# Patient Record
Sex: FEMALE | Race: WHITE | Employment: OTHER | URBAN - NONMETROPOLITAN AREA
[De-identification: names, ages, dates, MRNs, and addresses within clinical notes are randomized per-mention and may not be internally consistent; named-entity substitution may affect disease eponyms.]

---

## 2021-04-27 ENCOUNTER — OFFICE VISIT (OUTPATIENT)
Dept: PAIN MANAGEMENT | Age: 81
End: 2021-04-27
Payer: MEDICARE

## 2021-04-27 VITALS
HEART RATE: 80 BPM | SYSTOLIC BLOOD PRESSURE: 108 MMHG | DIASTOLIC BLOOD PRESSURE: 80 MMHG | WEIGHT: 120 LBS | BODY MASS INDEX: 23.56 KG/M2 | HEIGHT: 60 IN

## 2021-04-27 DIAGNOSIS — M54.50 ACUTE MIDLINE LOW BACK PAIN WITHOUT SCIATICA: Primary | ICD-10-CM

## 2021-04-27 DIAGNOSIS — S32.050A CLOSED COMPRESSION FRACTURE OF L5 VERTEBRA, INITIAL ENCOUNTER (HCC): ICD-10-CM

## 2021-04-27 PROCEDURE — 4040F PNEUMOC VAC/ADMIN/RCVD: CPT | Performed by: NURSE PRACTITIONER

## 2021-04-27 PROCEDURE — 1036F TOBACCO NON-USER: CPT | Performed by: NURSE PRACTITIONER

## 2021-04-27 PROCEDURE — 1090F PRES/ABSN URINE INCON ASSESS: CPT | Performed by: NURSE PRACTITIONER

## 2021-04-27 PROCEDURE — 99203 OFFICE O/P NEW LOW 30 MIN: CPT | Performed by: NURSE PRACTITIONER

## 2021-04-27 PROCEDURE — G8427 DOCREV CUR MEDS BY ELIG CLIN: HCPCS | Performed by: NURSE PRACTITIONER

## 2021-04-27 PROCEDURE — G8400 PT W/DXA NO RESULTS DOC: HCPCS | Performed by: NURSE PRACTITIONER

## 2021-04-27 PROCEDURE — G8420 CALC BMI NORM PARAMETERS: HCPCS | Performed by: NURSE PRACTITIONER

## 2021-04-27 PROCEDURE — 99204 OFFICE O/P NEW MOD 45 MIN: CPT | Performed by: NURSE PRACTITIONER

## 2021-04-27 PROCEDURE — 1123F ACP DISCUSS/DSCN MKR DOCD: CPT | Performed by: NURSE PRACTITIONER

## 2021-04-27 RX ORDER — TRAMADOL HYDROCHLORIDE 50 MG/1
1 TABLET ORAL PRN
COMMUNITY
Start: 2021-04-24 | End: 2021-10-30

## 2021-04-27 RX ORDER — GABAPENTIN 100 MG/1
2 CAPSULE ORAL 3 TIMES DAILY
COMMUNITY
Start: 2021-03-05

## 2021-04-27 RX ORDER — ACETAMINOPHEN/DIPHENHYDRAMINE 500MG-25MG
1 TABLET ORAL NIGHTLY PRN
COMMUNITY

## 2021-04-27 RX ORDER — SILICONE ADHESIVE 1.5" X 3"
1 SHEET (EA) TOPICAL 3 TIMES DAILY
COMMUNITY

## 2021-04-27 RX ORDER — ALENDRONATE SODIUM 70 MG/1
70 TABLET ORAL WEEKLY
COMMUNITY
Start: 2020-08-20 | End: 2022-05-10

## 2021-04-27 RX ORDER — SENNOSIDES 8.6 MG
650 CAPSULE ORAL EVERY 8 HOURS PRN
COMMUNITY

## 2021-04-27 ASSESSMENT — ENCOUNTER SYMPTOMS
EYES NEGATIVE: 1
RESPIRATORY NEGATIVE: 1

## 2021-04-27 NOTE — PROGRESS NOTES
Subjective:      Patient ID: Danny Azul is a 78 y.o. female. Chief Complaint   Patient presents with   [facility]    Fall     4/22- Compression fx    Lower Back Pain     HPI Returns for compression fracture post fall. Tramadol prn with relief    Pain Assessment  Location of Pain: Back  Location Modifiers: Inferior, Posterior  Severity of Pain: 6  Quality of Pain: Sharp  Duration of Pain: Persistent  Frequency of Pain: Constant  Aggravating Factors: Other (Comment), Walking, Stairs(sitting)  Limiting Behavior: Yes  Relieving Factors: Heat, Other (Comment)(Tramadol)  Result of Injury: Yes  Work-Related Injury: No  Are there other pain locations you wish to document?: No    Allergies   Allergen Reactions    Asa [Aspirin]     Codeine     Pcn [Penicillins]        Outpatient Medications Marked as Taking for the 4/27/21 encounter (Office Visit) with GOYO Poe - CNP   Medication Sig Dispense Refill    alendronate (FOSAMAX) 70 MG tablet Take 70 mg by mouth once a week      diphenhydrAMINE-APAP, sleep, (TYLENOL PM EXTRA STRENGTH)  MG tablet Take 1 tablet by mouth nightly as needed for Sleep      gabapentin (NEURONTIN) 100 MG capsule Take 2 capsules by mouth 3 times daily.  sodium chloride () 5 % ophthalmic solution Apply 1 drop to eye 3 times daily      traMADol (ULTRAM) 50 MG tablet Take 1 tablet by mouth as needed.  acetaminophen (TYLENOL 8 HOUR ARTHRITIS PAIN) 650 MG extended release tablet Take 650 mg by mouth every 8 hours as needed for Pain      citalopram (CELEXA) 20 MG tablet Take 20 mg by mouth daily.  clopidogrel (PLAVIX) 75 MG tablet Take 75 mg by mouth daily.  b complex vitamins capsule Take 1 capsule by mouth daily.  Pyridoxine HCl (VITAMIN B-6) 50 MG tablet Take 50 mg by mouth daily.  vitamin D3 (CHOLECALCIFEROL) 1000 UNITS TABS tablet Take 1,000 Units by mouth 2 times daily.  Valerian Root 100 MG CAPS Take  by mouth nightly.  Alum Hydroxide-Mag Trisilicate (GAVISCON) 28-24.2 MG CHEW Take by mouth as needed          Past Medical History:   Diagnosis Date    Chronic back pain     Neuropathy     Stroke ([address]     x2 in 2009 and x1 in 2010       Past Surgical History:   Procedure Laterality Date    ABDOMEN SURGERY  1995    bowel resection for diverticulosis    APPENDECTOMY      FIXATION KYPHOPLASTY  7/3/14    T 12    HYSTERECTOMY         No family history on file. Social History     Socioeconomic History    Marital status:      Spouse name: None    Number of children: None    Years of education: None    Highest education level: None   Occupational History    None   Social Needs    Financial resource strain: None    Food insecurity     Worry: None     Inability: None    Transportation needs     Medical: None     Non-medical: None   Tobacco Use    Smoking status: Never Smoker    Smokeless tobacco: Never Used   Substance and Sexual Activity    Alcohol use: No    Drug use: No    Sexual activity: None   Lifestyle    Physical activity     Days per week: None     Minutes per session: None    Stress: None   Relationships    Social connections     Talks on phone: None     Gets together: None     Attends [employer] service: None     Active member of club or organization: None     Attends meetings of clubs or organizations: None     Relationship status: None    Intimate partner violence     Fear of current or ex partner: None     Emotionally abused: None     Physically abused: None     Forced sexual activity: None   Other Topics Concern    None   Social History Narrative    None     Review of Systems   Constitutional: Positive for activity change. HENT: Negative. Eyes: Negative. Respiratory: Negative. Cardiovascular: Negative. Musculoskeletal: Positive for arthralgias and myalgias. Skin: Negative. Neurological: Negative for weakness and numbness. Hematological: Bruises/bleeds easily. Psychiatric/Behavioral: Positive for sleep disturbance. Objective:   Physical Exam  Vitals signs reviewed. Constitutional:       General: She is awake. She is not in acute distress. Appearance: She is well-developed. She is not ill-appearing or toxic-appearing. Interventions: She is not intubated. HENT:      Head: Normocephalic and atraumatic. Right Ear: External ear normal.      Left Ear: External ear normal.      Nose: Nose normal.      Mouth/Throat:      Lips: Pink. Mouth: Mucous membranes are moist.   Eyes:      General: Lids are normal.   Pulmonary:      Effort: Pulmonary effort is normal. No tachypnea, bradypnea, accessory muscle usage, prolonged expiration, respiratory distress or retractions. She is not intubated. Abdominal:      General: Abdomen is flat. Palpations: Abdomen is soft. Musculoskeletal:      Lumbar back: She exhibits bony tenderness and pain. Skin:     General: Skin is warm and dry. Capillary Refill: Capillary refill takes less than 2 seconds. Coloration: Skin is not ashen or jaundiced. Findings: No rash. Nails: There is no clubbing. Neurological:      Mental Status: She is alert, oriented to person, place, and time and easily aroused. GCS: GCS eye subscore is 4. GCS verbal subscore is 5. GCS motor subscore is 6. Cranial Nerves: No cranial nerve deficit or facial asymmetry. Psychiatric:         Attention and Perception: Attention normal.         Mood and Affect: Mood and affect normal.         Speech: Speech normal.         Behavior: Behavior is cooperative. Judgment: Judgment normal.         Assessment / Plan:      Diagnosis Orders   1.  Acute midline low back pain without sciatica  MRI LUMBAR SPINE WO CONTRAST   2. Closed compression fracture of L5 vertebra, initial encounter ([facility] [address]  MRI LUMBAR SPINE WO CONTRAST        Lumbar MRI wo contrast. Will plan L5 kyphoplasty    The patient was counseled at length about the risks of black Covid-19 during their perioperative period and any recovery window from their procedure. The patient was made aware that black Covid-19  may worsen their prognosis for recovering from their procedure  and lend to a higher morbidity and/or mortality risk. All material risks, benefits, and reasonable alternatives including postponing the procedure were discussed. The patient does wish to proceed with the procedure at this time. Informed consent has not been obtained for procedure. Rashid Farfan  on blood thinners. Medications to hold have been reviewed with patient. Blood thinners must be held with permission from your cardiologist or primary care physician. A letter is  required to besent to PCP/Cardiologist regarding holding medications for procedure to decrease bleeding risk.

## 2021-04-28 ENCOUNTER — PRE-PROCEDURE TELEPHONE (OUTPATIENT)
Dept: PREADMISSION TESTING | Age: 81
End: 2021-04-28

## 2021-04-29 ENCOUNTER — HOSPITAL ENCOUNTER (OUTPATIENT)
Dept: MRI IMAGING | Age: 81
Discharge: HOME | End: 2021-05-01
Payer: MEDICARE

## 2021-04-29 DIAGNOSIS — S32.050A CLOSED COMPRESSION FRACTURE OF L5 VERTEBRA, INITIAL ENCOUNTER (HCC): ICD-10-CM

## 2021-04-29 DIAGNOSIS — M54.50 ACUTE MIDLINE LOW BACK PAIN WITHOUT SCIATICA: ICD-10-CM

## 2021-04-29 PROCEDURE — 72148 MRI LUMBAR SPINE W/O DYE: CPT

## 2021-05-03 ENCOUNTER — PRE-PROCEDURE TELEPHONE (OUTPATIENT)
Dept: PREADMISSION TESTING | Age: 81
End: 2021-05-03

## 2021-05-03 NOTE — TELEPHONE ENCOUNTER
Spoke with patients son and confirmed a covid swab appt for May 5th at 1015. Instructions provided, verbalizes understanding.

## 2021-05-05 ENCOUNTER — HOSPITAL ENCOUNTER (OUTPATIENT)
Dept: PREADMISSION TESTING | Age: 81
Setting detail: SPECIMEN
Discharge: HOME | End: 2021-05-09
Payer: MEDICARE

## 2021-05-05 DIAGNOSIS — Z11.59 ENCOUNTER FOR SCREENING FOR OTHER VIRAL DISEASES: Primary | ICD-10-CM

## 2021-05-05 PROCEDURE — U0005 INFEC AGEN DETEC AMPLI PROBE: HCPCS

## 2021-05-05 PROCEDURE — U0003 INFECTIOUS AGENT DETECTION BY NUCLEIC ACID (DNA OR RNA); SEVERE ACUTE RESPIRATORY SYNDROME CORONAVIRUS 2 (SARS-COV-2) (CORONAVIRUS DISEASE [COVID-19]), AMPLIFIED PROBE TECHNIQUE, MAKING USE OF HIGH THROUGHPUT TECHNOLOGIES AS DESCRIBED BY CMS-2020-01-R: HCPCS

## 2021-05-06 ENCOUNTER — TELEPHONE (OUTPATIENT)
Dept: PAIN MANAGEMENT | Age: 81
End: 2021-05-06

## 2021-05-06 ENCOUNTER — ANESTHESIA EVENT (OUTPATIENT)
Dept: OPERATING ROOM | Age: 81
End: 2021-05-06
Payer: MEDICARE

## 2021-05-06 DIAGNOSIS — Z01.818 PRE-OP EXAM: Primary | ICD-10-CM

## 2021-05-06 LAB
SARS-COV-2: NORMAL
SARS-COV-2: NOT DETECTED
SOURCE: NORMAL

## 2021-05-06 NOTE — TELEPHONE ENCOUNTER
The patient is scheduled for a kyphoplasty on 5/10/2021, she is aware that an EKG should be completed ASAP and stated that she will be in the clinic in the morning to complete this.  She was already approved to be off of her plavix, approval in media tab

## 2021-05-07 ENCOUNTER — HOSPITAL ENCOUNTER (OUTPATIENT)
Dept: NON INVASIVE DIAGNOSTICS | Age: 81
Discharge: HOME | End: 2021-05-07
Payer: MEDICARE

## 2021-05-07 DIAGNOSIS — Z01.818 PRE-OP EXAM: ICD-10-CM

## 2021-05-07 PROCEDURE — 93005 ELECTROCARDIOGRAM TRACING: CPT

## 2021-05-08 LAB
EKG ATRIAL RATE: 79 BPM
EKG P-R INTERVAL: 140 MS
EKG Q-T INTERVAL: 394 MS
EKG QRS DURATION: 76 MS
EKG QTC CALCULATION (BAZETT): 451 MS
EKG R AXIS: -25 DEGREES
EKG T AXIS: 54 DEGREES
EKG VENTRICULAR RATE: 79 BPM

## 2021-05-10 ENCOUNTER — HOSPITAL ENCOUNTER (OUTPATIENT)
Age: 81
Setting detail: OUTPATIENT SURGERY
Discharge: HOME | End: 2021-05-10
Attending: PHYSICAL MEDICINE & REHABILITATION | Admitting: PHYSICAL MEDICINE & REHABILITATION
Payer: MEDICARE

## 2021-05-10 ENCOUNTER — ANESTHESIA (OUTPATIENT)
Dept: OPERATING ROOM | Age: 81
End: 2021-05-10
Payer: MEDICARE

## 2021-05-10 ENCOUNTER — APPOINTMENT (OUTPATIENT)
Dept: GENERAL RADIOLOGY | Age: 81
End: 2021-05-10
Attending: PHYSICAL MEDICINE & REHABILITATION
Payer: MEDICARE

## 2021-05-10 VITALS
TEMPERATURE: 97.8 F | HEIGHT: 62 IN | WEIGHT: 120 LBS | BODY MASS INDEX: 22.08 KG/M2 | RESPIRATION RATE: 16 BRPM | HEART RATE: 54 BPM | OXYGEN SATURATION: 100 % | SYSTOLIC BLOOD PRESSURE: 125 MMHG | DIASTOLIC BLOOD PRESSURE: 58 MMHG

## 2021-05-10 VITALS
DIASTOLIC BLOOD PRESSURE: 81 MMHG | OXYGEN SATURATION: 100 % | SYSTOLIC BLOOD PRESSURE: 177 MMHG | RESPIRATION RATE: 1 BRPM | TEMPERATURE: 98.2 F

## 2021-05-10 PROCEDURE — 2709999900 HC NON-CHARGEABLE SUPPLY: Performed by: PHYSICAL MEDICINE & REHABILITATION

## 2021-05-10 PROCEDURE — 3700000001 HC ADD 15 MINUTES (ANESTHESIA): Performed by: PHYSICAL MEDICINE & REHABILITATION

## 2021-05-10 PROCEDURE — 3700000000 HC ANESTHESIA ATTENDED CARE: Performed by: PHYSICAL MEDICINE & REHABILITATION

## 2021-05-10 PROCEDURE — 6360000002 HC RX W HCPCS: Performed by: NURSE ANESTHETIST, CERTIFIED REGISTERED

## 2021-05-10 PROCEDURE — 6360000004 HC RX CONTRAST MEDICATION: Performed by: PHYSICAL MEDICINE & REHABILITATION

## 2021-05-10 PROCEDURE — 22514 PERQ VERTEBRAL AUGMENTATION: CPT | Performed by: PHYSICAL MEDICINE & REHABILITATION

## 2021-05-10 PROCEDURE — 3600000013 HC SURGERY LEVEL 3 ADDTL 15MIN: Performed by: PHYSICAL MEDICINE & REHABILITATION

## 2021-05-10 PROCEDURE — 2720000010 HC SURG SUPPLY STERILE: Performed by: PHYSICAL MEDICINE & REHABILITATION

## 2021-05-10 PROCEDURE — 7100000011 HC PHASE II RECOVERY - ADDTL 15 MIN: Performed by: PHYSICAL MEDICINE & REHABILITATION

## 2021-05-10 PROCEDURE — 7100000001 HC PACU RECOVERY - ADDTL 15 MIN: Performed by: PHYSICAL MEDICINE & REHABILITATION

## 2021-05-10 PROCEDURE — 7100000000 HC PACU RECOVERY - FIRST 15 MIN: Performed by: PHYSICAL MEDICINE & REHABILITATION

## 2021-05-10 PROCEDURE — 3209999900 FLUORO FOR SURGICAL PROCEDURES

## 2021-05-10 PROCEDURE — 2500000003 HC RX 250 WO HCPCS: Performed by: NURSE ANESTHETIST, CERTIFIED REGISTERED

## 2021-05-10 PROCEDURE — 2500000003 HC RX 250 WO HCPCS: Performed by: PHYSICAL MEDICINE & REHABILITATION

## 2021-05-10 PROCEDURE — C1894 INTRO/SHEATH, NON-LASER: HCPCS | Performed by: PHYSICAL MEDICINE & REHABILITATION

## 2021-05-10 PROCEDURE — 2580000003 HC RX 258: Performed by: NURSE ANESTHETIST, CERTIFIED REGISTERED

## 2021-05-10 PROCEDURE — 3600000003 HC SURGERY LEVEL 3 BASE: Performed by: PHYSICAL MEDICINE & REHABILITATION

## 2021-05-10 PROCEDURE — C1713 ANCHOR/SCREW BN/BN,TIS/BN: HCPCS | Performed by: PHYSICAL MEDICINE & REHABILITATION

## 2021-05-10 PROCEDURE — 7100000010 HC PHASE II RECOVERY - FIRST 15 MIN: Performed by: PHYSICAL MEDICINE & REHABILITATION

## 2021-05-10 PROCEDURE — 22515 PERQ VERTEBRAL AUGMENTATION: CPT | Performed by: PHYSICAL MEDICINE & REHABILITATION

## 2021-05-10 DEVICE — BONE CEMENT CX01A XPEDE US
Type: IMPLANTABLE DEVICE | Site: BACK | Status: FUNCTIONAL
Brand: KYPHON® XPEDE™ BONE CEMENT

## 2021-05-10 RX ORDER — DEXAMETHASONE SODIUM PHOSPHATE 4 MG/ML
INJECTION, SOLUTION INTRA-ARTICULAR; INTRALESIONAL; INTRAMUSCULAR; INTRAVENOUS; SOFT TISSUE PRN
Status: DISCONTINUED | OUTPATIENT
Start: 2021-05-10 | End: 2021-05-10 | Stop reason: SDUPTHER

## 2021-05-10 RX ORDER — SODIUM CHLORIDE 0.9 % (FLUSH) 0.9 %
10 SYRINGE (ML) INJECTION PRN
Status: DISCONTINUED | OUTPATIENT
Start: 2021-05-10 | End: 2021-05-10 | Stop reason: HOSPADM

## 2021-05-10 RX ORDER — ROCURONIUM BROMIDE 10 MG/ML
INJECTION, SOLUTION INTRAVENOUS PRN
Status: DISCONTINUED | OUTPATIENT
Start: 2021-05-10 | End: 2021-05-10 | Stop reason: SDUPTHER

## 2021-05-10 RX ORDER — CLINDAMYCIN PHOSPHATE 900 MG/50ML
900 INJECTION INTRAVENOUS SEE ADMIN INSTRUCTIONS
Qty: 1 ML | Refills: 0 | Status: SHIPPED | OUTPATIENT
Start: 2021-05-10 | End: 2021-05-10 | Stop reason: SDUPTHER

## 2021-05-10 RX ORDER — GLYCOPYRROLATE 1 MG/5 ML
SYRINGE (ML) INTRAVENOUS PRN
Status: DISCONTINUED | OUTPATIENT
Start: 2021-05-10 | End: 2021-05-10 | Stop reason: SDUPTHER

## 2021-05-10 RX ORDER — SODIUM CHLORIDE, SODIUM LACTATE, POTASSIUM CHLORIDE, CALCIUM CHLORIDE 600; 310; 30; 20 MG/100ML; MG/100ML; MG/100ML; MG/100ML
INJECTION, SOLUTION INTRAVENOUS CONTINUOUS
Status: DISCONTINUED | OUTPATIENT
Start: 2021-05-10 | End: 2021-05-10 | Stop reason: HOSPADM

## 2021-05-10 RX ORDER — CLINDAMYCIN PHOSPHATE 150 MG/ML
900 INJECTION, SOLUTION INTRAVENOUS ONCE
Status: COMPLETED | OUTPATIENT
Start: 2021-05-10 | End: 2021-05-10

## 2021-05-10 RX ORDER — FENTANYL CITRATE 50 UG/ML
INJECTION, SOLUTION INTRAMUSCULAR; INTRAVENOUS PRN
Status: DISCONTINUED | OUTPATIENT
Start: 2021-05-10 | End: 2021-05-10 | Stop reason: SDUPTHER

## 2021-05-10 RX ORDER — PROPOFOL 10 MG/ML
INJECTION, EMULSION INTRAVENOUS PRN
Status: DISCONTINUED | OUTPATIENT
Start: 2021-05-10 | End: 2021-05-10 | Stop reason: SDUPTHER

## 2021-05-10 RX ORDER — LIDOCAINE HYDROCHLORIDE 20 MG/ML
INJECTION, SOLUTION EPIDURAL; INFILTRATION; INTRACAUDAL; PERINEURAL PRN
Status: DISCONTINUED | OUTPATIENT
Start: 2021-05-10 | End: 2021-05-10 | Stop reason: SDUPTHER

## 2021-05-10 RX ORDER — SODIUM CHLORIDE, SODIUM LACTATE, POTASSIUM CHLORIDE, CALCIUM CHLORIDE 600; 310; 30; 20 MG/100ML; MG/100ML; MG/100ML; MG/100ML
INJECTION, SOLUTION INTRAVENOUS CONTINUOUS PRN
Status: DISCONTINUED | OUTPATIENT
Start: 2021-05-10 | End: 2021-05-10 | Stop reason: SDUPTHER

## 2021-05-10 RX ORDER — ONDANSETRON 2 MG/ML
INJECTION INTRAMUSCULAR; INTRAVENOUS PRN
Status: DISCONTINUED | OUTPATIENT
Start: 2021-05-10 | End: 2021-05-10 | Stop reason: SDUPTHER

## 2021-05-10 RX ORDER — KETOROLAC TROMETHAMINE 30 MG/ML
INJECTION, SOLUTION INTRAMUSCULAR; INTRAVENOUS PRN
Status: DISCONTINUED | OUTPATIENT
Start: 2021-05-10 | End: 2021-05-10 | Stop reason: SDUPTHER

## 2021-05-10 RX ORDER — SODIUM CHLORIDE 9 MG/ML
25 INJECTION, SOLUTION INTRAVENOUS PRN
Status: DISCONTINUED | OUTPATIENT
Start: 2021-05-10 | End: 2021-05-10 | Stop reason: HOSPADM

## 2021-05-10 RX ORDER — SODIUM CHLORIDE 0.9 % (FLUSH) 0.9 %
10 SYRINGE (ML) INJECTION EVERY 12 HOURS SCHEDULED
Status: DISCONTINUED | OUTPATIENT
Start: 2021-05-10 | End: 2021-05-10 | Stop reason: HOSPADM

## 2021-05-10 RX ORDER — PHENYLEPHRINE HYDROCHLORIDE 10 MG/ML
INJECTION INTRAVENOUS PRN
Status: DISCONTINUED | OUTPATIENT
Start: 2021-05-10 | End: 2021-05-10 | Stop reason: SDUPTHER

## 2021-05-10 RX ADMIN — ROCURONIUM BROMIDE 20 MG: 10 INJECTION, SOLUTION INTRAVENOUS at 12:33

## 2021-05-10 RX ADMIN — DEXAMETHASONE SODIUM PHOSPHATE 4 MG: 4 INJECTION, SOLUTION INTRAMUSCULAR; INTRAVENOUS at 13:05

## 2021-05-10 RX ADMIN — FENTANYL CITRATE 25 MCG: 50 INJECTION, SOLUTION INTRAMUSCULAR; INTRAVENOUS at 13:05

## 2021-05-10 RX ADMIN — SODIUM CHLORIDE, POTASSIUM CHLORIDE, SODIUM LACTATE AND CALCIUM CHLORIDE: 600; 310; 30; 20 INJECTION, SOLUTION INTRAVENOUS at 12:30

## 2021-05-10 RX ADMIN — CLINDAMYCIN PHOSPHATE 900 MG: 150 INJECTION, SOLUTION INTRAMUSCULAR; INTRAVENOUS at 12:45

## 2021-05-10 RX ADMIN — FENTANYL CITRATE 25 MCG: 50 INJECTION, SOLUTION INTRAMUSCULAR; INTRAVENOUS at 13:26

## 2021-05-10 RX ADMIN — KETOROLAC TROMETHAMINE 15 MG: 30 INJECTION, SOLUTION INTRAMUSCULAR; INTRAVENOUS at 13:11

## 2021-05-10 RX ADMIN — PHENYLEPHRINE HYDROCHLORIDE 100 MCG: 10 INJECTION INTRAVENOUS at 13:03

## 2021-05-10 RX ADMIN — ONDANSETRON 4 MG: 2 INJECTION INTRAMUSCULAR; INTRAVENOUS at 13:05

## 2021-05-10 RX ADMIN — FENTANYL CITRATE 50 MCG: 50 INJECTION, SOLUTION INTRAMUSCULAR; INTRAVENOUS at 12:30

## 2021-05-10 RX ADMIN — PHENYLEPHRINE HYDROCHLORIDE 100 MCG: 10 INJECTION INTRAVENOUS at 12:46

## 2021-05-10 RX ADMIN — PROPOFOL 140 MG: 10 INJECTION, EMULSION INTRAVENOUS at 12:33

## 2021-05-10 RX ADMIN — SODIUM CHLORIDE, POTASSIUM CHLORIDE, SODIUM LACTATE AND CALCIUM CHLORIDE: 600; 310; 30; 20 INJECTION, SOLUTION INTRAVENOUS at 13:17

## 2021-05-10 RX ADMIN — PHENYLEPHRINE HYDROCHLORIDE 100 MCG: 10 INJECTION INTRAVENOUS at 12:51

## 2021-05-10 RX ADMIN — PHENYLEPHRINE HYDROCHLORIDE 100 MCG: 10 INJECTION INTRAVENOUS at 13:00

## 2021-05-10 RX ADMIN — LIDOCAINE HYDROCHLORIDE 100 MG: 20 INJECTION, SOLUTION EPIDURAL; INFILTRATION; INTRACAUDAL; PERINEURAL at 12:33

## 2021-05-10 RX ADMIN — PHENYLEPHRINE HYDROCHLORIDE 100 MCG: 10 INJECTION INTRAVENOUS at 12:39

## 2021-05-10 RX ADMIN — PHENYLEPHRINE HYDROCHLORIDE 100 MCG: 10 INJECTION INTRAVENOUS at 13:05

## 2021-05-10 RX ADMIN — PHENYLEPHRINE HYDROCHLORIDE 100 MCG: 10 INJECTION INTRAVENOUS at 12:58

## 2021-05-10 RX ADMIN — PHENYLEPHRINE HYDROCHLORIDE 100 MCG: 10 INJECTION INTRAVENOUS at 12:54

## 2021-05-10 RX ADMIN — Medication 0.2 MG: at 13:07

## 2021-05-10 ASSESSMENT — PAIN DESCRIPTION - ORIENTATION
ORIENTATION: MID
ORIENTATION: MID

## 2021-05-10 ASSESSMENT — PULMONARY FUNCTION TESTS
PIF_VALUE: 16
PIF_VALUE: 15
PIF_VALUE: 15
PIF_VALUE: 4
PIF_VALUE: 17
PIF_VALUE: 18
PIF_VALUE: 15
PIF_VALUE: 16
PIF_VALUE: 1
PIF_VALUE: 4
PIF_VALUE: 16
PIF_VALUE: 15
PIF_VALUE: 16
PIF_VALUE: 16
PIF_VALUE: 18
PIF_VALUE: 15
PIF_VALUE: 16
PIF_VALUE: 15
PIF_VALUE: 16
PIF_VALUE: 15
PIF_VALUE: 15
PIF_VALUE: 4
PIF_VALUE: 16
PIF_VALUE: 1
PIF_VALUE: 15
PIF_VALUE: 15
PIF_VALUE: 17
PIF_VALUE: 16
PIF_VALUE: 15
PIF_VALUE: 15
PIF_VALUE: 16
PIF_VALUE: 15
PIF_VALUE: 1
PIF_VALUE: 15
PIF_VALUE: 16
PIF_VALUE: 15
PIF_VALUE: 18
PIF_VALUE: 16
PIF_VALUE: 15
PIF_VALUE: 3
PIF_VALUE: 17
PIF_VALUE: 16

## 2021-05-10 ASSESSMENT — PAIN DESCRIPTION - DESCRIPTORS
DESCRIPTORS: DISCOMFORT
DESCRIPTORS: CONSTANT;ACHING

## 2021-05-10 ASSESSMENT — PAIN SCALES - GENERAL
PAINLEVEL_OUTOF10: 2
PAINLEVEL_OUTOF10: 0

## 2021-05-10 ASSESSMENT — PAIN DESCRIPTION - PAIN TYPE: TYPE: SURGICAL PAIN

## 2021-05-10 ASSESSMENT — PAIN DESCRIPTION - LOCATION: LOCATION: BACK

## 2021-05-10 NOTE — ANESTHESIA POSTPROCEDURE EVALUATION
Department of Anesthesiology  Postprocedure Note    Patient: Guanakito Obrien  MRN: [id number]  [place]: 1940  Date of evaluation: 5/10/2021  Time:  1:55 PM     Procedure Summary     Date: 05/10/21 Room / Location: [address] [place] [address]    Anesthesia Start: 1230 Anesthesia Stop:     Procedure: L2 ET L4 KYPHOPLASTY (N/A ) Diagnosis: (L4 compressor fracture & low back pain)    Surgeons: Ana Live MD Responsible Provider: Minus Paget, APRN - CRNA    Anesthesia Type: general ASA Status: 2          Anesthesia Type: No value filed. Beryl Phase I: Beryl Score: 9    Beryl Phase II:      Last vitals: Reviewed and per EMR flowsheets.        Anesthesia Post Evaluation    Patient location during evaluation: PACU  Patient participation: complete - patient participated  Level of consciousness: awake and alert  Pain score: 0  Airway patency: patent  Nausea & Vomiting: no nausea and no vomiting  Complications: no  Cardiovascular status: blood pressure returned to baseline and hemodynamically stable  Respiratory status: acceptable, spontaneous ventilation and room air  Hydration status: euvolemic

## 2021-05-10 NOTE — INTERVAL H&P NOTE
I have interviewed and examined the patient and reviewed the recent History and Physical.  There have been no changes to the recent H&P documentation. The surgical consent form has been signed. Last anticoagulant medication use was:na    Premedication taken for contrast allergy? No    Valium taken for oral sedation? No    No outpatient medications have been marked as taking for the 5/10/21 encounter [facility] Encounter). The patient understands the planned operation and its associated risks and benefits and agrees to proceed.         Electronically signed by Madai Solis MD on 5/10/2021 at 11:57 AM

## 2021-05-10 NOTE — H&P
.            Signed        Expand AllCollapse All     Show:Clear all  [x]Manual[x]Template[]Copied    Added by:  [x]GOYO Strauss CNP    []Silvio for details  Subjective:      Patient ID: Cheyenne Collins is a 78 y.o. female. Chief Complaint   Patient presents with   [facility]    Fall       4/22- Compression fx    Lower Back Pain      HPI Returns for compression fracture post fall. Tramadol prn with relief     Pain Assessment  Location of Pain: Back  Location Modifiers: Inferior, Posterior  Severity of Pain: 6  Quality of Pain: Sharp  Duration of Pain: Persistent  Frequency of Pain: Constant  Aggravating Factors: Other (Comment), Walking, Stairs(sitting)  Limiting Behavior: Yes  Relieving Factors: Heat, Other (Comment)(Tramadol)  Result of Injury: Yes  Work-Related Injury: No  Are there other pain locations you wish to document?: No     Allergies   Allergen Reactions    Asa [Aspirin]      Codeine      Pcn [Penicillins]           Active Medications          Outpatient Medications Marked as Taking for the 4/27/21 encounter (Office Visit) with GOYO Del Rosario CNP   Medication Sig Dispense Refill    alendronate (FOSAMAX) 70 MG tablet Take 70 mg by mouth once a week        diphenhydrAMINE-APAP, sleep, (TYLENOL PM EXTRA STRENGTH)  MG tablet Take 1 tablet by mouth nightly as needed for Sleep        gabapentin (NEURONTIN) 100 MG capsule Take 2 capsules by mouth 3 times daily.  sodium chloride () 5 % ophthalmic solution Apply 1 drop to eye 3 times daily        traMADol (ULTRAM) 50 MG tablet Take 1 tablet by mouth as needed.  acetaminophen (TYLENOL 8 HOUR ARTHRITIS PAIN) 650 MG extended release tablet Take 650 mg by mouth every 8 hours as needed for Pain        citalopram (CELEXA) 20 MG tablet Take 20 mg by mouth daily.  clopidogrel (PLAVIX) 75 MG tablet Take 75 mg by mouth daily.  b complex vitamins capsule Take 1 capsule by mouth daily.  Pyridoxine HCl (VITAMIN B-6) 50 MG tablet Take 50 mg by mouth daily.  vitamin D3 (CHOLECALCIFEROL) 1000 UNITS TABS tablet Take 1,000 Units by mouth 2 times daily.  Valerian Root 100 MG CAPS Take  by mouth nightly.  Alum Hydroxide-Mag Trisilicate (GAVISCON) 48-92.2 MG CHEW Take by mouth as needed                 Past Medical History        Past Medical History:   Diagnosis Date    Chronic back pain      Neuropathy      Stroke ([address]       x2 in 2009 and x1 in 2010            Past Surgical History         Past Surgical History:   Procedure Laterality Date    ABDOMEN SURGERY   1995     bowel resection for diverticulosis    APPENDECTOMY        FIXATION KYPHOPLASTY   7/3/14     T 12    HYSTERECTOMY                Family History   No family history on file. Social History   Social History            Socioeconomic History    Marital status:         Spouse name: None    Number of children: None    Years of education: None    Highest education level: None   Occupational History    None   Social Needs    Financial resource strain: None    Food insecurity       Worry: None       Inability: None    Transportation needs       Medical: None       Non-medical: None   Tobacco Use    Smoking status: Never Smoker    Smokeless tobacco: Never Used   Substance and Sexual Activity    Alcohol use: No    Drug use: No    Sexual activity: None   Lifestyle    Physical activity       Days per week: None       Minutes per session: None    Stress: None   Relationships    Social connections       Talks on phone: None       Gets together: None       Attends Christianity service: None       Active member of club or organization: None       Attends meetings of clubs or organizations: None       Relationship status: None    Intimate partner violence       Fear of current or ex partner: None       Emotionally abused: None       Physically abused: None       Forced sexual activity: None   Other Behavior: Behavior is cooperative. Judgment: Judgment normal.            Assessment / Plan:        Diagnosis Orders   1. Acute midline low back pain without sciatica  MRI LUMBAR SPINE WO CONTRAST   2. Closed compression fracture of L5 vertebra, initial encounter ([facility] [address]  MRI LUMBAR SPINE WO CONTRAST                    Lumbar MRI wo contrast. Will plan L5 kyphoplasty     The patient was counseled at length about the risks of black Covid-19 during their perioperative period and any recovery window from their procedure. The patient was made aware that black Covid-19  may worsen their prognosis for recovering from their procedure  and lend to a higher morbidity and/or mortality risk. All material risks, benefits, and reasonable alternatives including postponing the procedure were discussed. The patient does wish to proceed with the procedure at this time. Informed consent has not been obtained for procedure. Dudley Dsouza  on blood thinners. Medications to hold have been reviewed with patient. Blood thinners must be held with permission from your cardiologist or primary care physician. A letter is  required to besent to PCP/Cardiologist regarding holding medications for procedure to decrease bleeding risk.

## 2021-05-10 NOTE — OP NOTE
KYPHOPLASTY OPERATIVE REPORT    Surgeon: Madai Solis MD    5/10/21  The patient was counseled at length about the risks of black Covid-19 during their perioperative period and any recovery window from their procedure. The patient was made aware that black Covid-19  may worsen their prognosis for recovering from their procedure  and lend to a higher morbidity and/or mortality risk. All material risks, benefits, and reasonable alternatives including postponing the procedure were discussed. The patient does wish to proceed with the procedure at this time. Pre-operative Diagnosis:   Patient Active Problem List   Diagnosis Code    Compression fracture of T12 vertebra ([facility] [address] S22.080A    Osteoporosis with pathological fracture M80.00XA       Post-operative Diagnosis: Same    INDICATION:  Delmy Pendleton has a history of osteoporosis and is  on osteoporosis medication. L2, L4- balloon kyphoplasty is requested for therapeutic reasons. Please see H&P for details on previous treatments, examination findings, and work up. Conservative treatment was ineffective i.e.: ice, NSAIDS, rest, narcotic medication, chiropractic care, physical therapy and message therapy. Patient is unable to perform the following ADL's: ambulating and grooming     Pain Assessment: 0-10  Pain Level: 4        Pain Location: Back  Pain Descriptors: Constant, Aching    Last Plain films: 2020    EXAMINATION:  L2,L4 balloon kyphoplasty    CONSENT: Written consent was obtained from the patient on the preprinted consent form after explaining the procedure, indications, potential complications and outcomes.  Alternative treatments were also discussed. DISCUSSION: The patient was sterilely prepped and draped in the usual fashion in the prone position. Time out was verified for correct patient, side, level and procedure.      SEDATION: Patient was given general  anesthesia during the procedure by  The CRNA.   The patient was fairly sedated throughout the procedure and underwent constant continuous EKG, pulse oximetry and blood pressure monitoring independently by the CRNA/RN. SEDATION TIME:  80minutes. PROCEDURE TIME:  70 minutes.     PROCEDURE:  Using the image intensifier, the Bilateral L2,L4 pedicles were identified in AP and lateral views, A 1 cm paramedian incision was made next to the skin marker.  A transpedicular, extrapedicular, en face approach was used. The osteo introducer was then inserted and advanced to the pedicle taking and AP and lateral views were used to confirm trajectory, depth, and placement utilizing 2 C-arms. The osteo introducer was then positioned 0.5cm just past the posterior vertebral body wall. The procedure was repeated on the Bilateral side at the same level. The inner cannula was removed and a drill was inserted into the vertebral body under fluoroscopic guidance creating a channel toward the anterior cortex.  Bone biopsy was not taken. Had  One previously for T12 kyphoplasty      After completing entry into the vertebral body, an inflatable balloon tamp was inserted into the inner cannula bilaterally and advanced under fluoroscopic guidance into the vertebral body. Radiopaque markers on the bone tamp were identified using AP and lateral images.  The bone tamp was then inflated to a max volume of 1.5 of contrast and a PSI of 240. The inflation was monitored with AP and lateral imaging. Direct reduction of the fracture was achieved with 100% of the height restored.  The bone tamp was deflated and removed.     Using fluoroscopic imaging and the use of the bone void fillers, internal fixation was achieved through the low pressure injection of Kyphon Xpede bone cement using the Kyphon Xpander cement delivery system.   A total of 2 mL of cement was filled from the left side and 2 mL from the right side.  Once the bone cement had hardened, the cannulas were removed.  Post procedure, all incisions were closed with Steri-Strips and covered with gauze and Tegaderm to create a pressure dressing. The patient tolerated the procedure(s) well and without complications and was noted to be in stable condition prior to discharge from procedure center with discharge instructions. EBL: no blood loss    SPECIMEN: -. IMPRESSIONS:  1. L2,L4 Balloon Kyphoplasty accomplished without incident  2. L2,L4 Balloon Kyphoplasty   acute pain. RECOMMENDATIONS :  1. Follow up with Dr. Alon Luciano MD in the pain clinic in 2-4 weeks. 2. Complete and return Post-Procedure Pain and Activity Diary.   3. Contact me for symptom exacerbation, fever or unusual symptoms. 4. Post-procedure care according to verbal and written instructions at discharge. 5. Osteoporosis medication is strongly recommended, if not already initiated, for reduction in risk of additional osteoporotic fractures.      POST-PROCEDURE KYPHOPLASTY INTERPRETATION:    EXAMINATION: AP, lateral views. FLUOROSCOPY TIME: 185 seconds    DISCUSSION: Spot views of the spine reveal normal segmentation. Osteointroducers are positioned in the L2,L4 pedicle bilaterally.  Contrast in the bone tamp creates a cavity in the L2, L4  vertebral body.  The cement fills the cavity created by the balloon bone tamp. Extravasation of the cement was notseen.  Fracture reduction is estimated to be 100% .   Soft tissues reveal no abnormality. IMPRESSION: L2,L4 Balloon Kyphoplasty reveals satisfactory contrast and cement fill and fracture reduction.     Electronically signed by Alon Luciano MD on 5/10/2021 at 1:42 PM

## 2021-05-10 NOTE — ANESTHESIA PRE PROCEDURE
Department of Anesthesiology  Preprocedure Note       Name:  Claudeen Churches   Age:  78 y.o.  :  1940                                          MRN:  [id number]         Date:  5/10/2021      Surgeon: Kwadwo Lin):  Tiera Bourgeois MD    Procedure: Procedure(s):  L4 KYPHOPLASTY possible L2    Medications prior to admission:   Prior to Admission medications    Medication Sig Start Date End Date Taking? Authorizing Provider   alendronate (FOSAMAX) 70 MG tablet Take 70 mg by mouth once a week 20  Historical Provider, MD   diphenhydrAMINE-APAP, sleep, (TYLENOL PM EXTRA STRENGTH)  MG tablet Take 1 tablet by mouth nightly as needed for Sleep    Historical Provider, MD   gabapentin (NEURONTIN) 100 MG capsule Take 2 capsules by mouth 3 times daily. 3/5/21   Historical Provider, MD   sodium chloride () 5 % ophthalmic solution Apply 1 drop to eye 3 times daily    Historical Provider, MD   traMADol (ULTRAM) 50 MG tablet Take 1 tablet by mouth as needed. 21   Historical Provider, MD   acetaminophen (TYLENOL 8 HOUR ARTHRITIS PAIN) 650 MG extended release tablet Take 650 mg by mouth every 8 hours as needed for Pain    Historical Provider, MD   citalopram (CELEXA) 20 MG tablet Take 20 mg by mouth daily. Historical Provider, MD   clopidogrel (PLAVIX) 75 MG tablet Take 75 mg by mouth daily. Historical Provider, MD   b complex vitamins capsule Take 1 capsule by mouth daily. Historical Provider, MD   Pyridoxine HCl (VITAMIN B-6) 50 MG tablet Take 50 mg by mouth daily. Historical Provider, MD   vitamin D3 (CHOLECALCIFEROL) 1000 UNITS TABS tablet Take 1,000 Units by mouth 2 times daily. Historical Provider, MD   Valerian Root 100 MG CAPS Take  by mouth nightly. Historical Provider, MD   Alum Hydroxide-Mag Trisilicate (GAVISCON) 29-69.4 MG CHEW Take by mouth as needed     Historical Provider, MD       Current medications:    No current facility-administered medications for this encounter. Current Outpatient Medications   Medication Sig Dispense Refill    alendronate (FOSAMAX) 70 MG tablet Take 70 mg by mouth once a week      diphenhydrAMINE-APAP, sleep, (TYLENOL PM EXTRA STRENGTH)  MG tablet Take 1 tablet by mouth nightly as needed for Sleep      gabapentin (NEURONTIN) 100 MG capsule Take 2 capsules by mouth 3 times daily.  sodium chloride () 5 % ophthalmic solution Apply 1 drop to eye 3 times daily      traMADol (ULTRAM) 50 MG tablet Take 1 tablet by mouth as needed.  acetaminophen (TYLENOL 8 HOUR ARTHRITIS PAIN) 650 MG extended release tablet Take 650 mg by mouth every 8 hours as needed for Pain      citalopram (CELEXA) 20 MG tablet Take 20 mg by mouth daily.  clopidogrel (PLAVIX) 75 MG tablet Take 75 mg by mouth daily.  b complex vitamins capsule Take 1 capsule by mouth daily.  Pyridoxine HCl (VITAMIN B-6) 50 MG tablet Take 50 mg by mouth daily.  vitamin D3 (CHOLECALCIFEROL) 1000 UNITS TABS tablet Take 1,000 Units by mouth 2 times daily.  Valerian Root 100 MG CAPS Take  by mouth nightly.  Alum Hydroxide-Mag Trisilicate (GAVISCON) 84-01.4 MG CHEW Take by mouth as needed          Allergies:     Allergies   Allergen Reactions    Asa [Aspirin]     Codeine     Pcn [Penicillins]        Problem List:    Patient Active Problem List   Diagnosis Code    Compression fracture of T12 vertebra ([facility] [address] S22.080A    Osteoporosis with pathological fracture M80.00XA       Past Medical History:        Diagnosis Date    Chronic back pain     Neuropathy     Stroke ([facility] [address]     x2 in 2009 and x1 in 2010       Past Surgical History:        Procedure Laterality Date    [address]    bowel resection for diverticulosis    APPENDECTOMY      FIXATION KYPHOPLASTY  7/3/14    T 12    HYSTERECTOMY         Social History:    Social History     Tobacco Use    Smoking status: Never Smoker    Smokeless tobacco: Never Used   Substance Use Topics    Alcohol use: No                                Counseling given: Not Answered      Vital Signs (Current): There were no vitals filed for this visit. BP Readings from Last 3 Encounters:   04/27/21 108/80   07/17/14 132/68   06/27/14 120/60       NPO Status:                                                                                 BMI:   Wt Readings from Last 3 Encounters:   04/27/21 120 lb (54.4 kg)   07/17/14 147 lb (66.7 kg)   06/27/14 146 lb (66.2 kg)     There is no height or weight on file to calculate BMI.    CBC:   Lab Results   Component Value Date    WBC 6.3 07/03/2014    RBC 4.25 07/03/2014    HGB 13.1 07/03/2014    HCT 39.0 07/03/2014    MCV 91.8 07/03/2014    RDW 14.0 07/03/2014     07/03/2014       CMP:   Lab Results   Component Value Date     07/03/2014    K 3.8 07/03/2014     07/03/2014    CO2 27 07/03/2014    BUN 12 07/03/2014    CREATININE 0.52 07/03/2014    GFRAA >60 07/03/2014    LABGLOM >60 07/03/2014    GLUCOSE 96 07/03/2014    CALCIUM 9.4 07/03/2014       POC Tests: No results for input(s): POCGLU, POCNA, POCK, POCCL, POCBUN, POCHEMO, POCHCT in the last 72 hours.     Coags: No results found for: PROTIME, INR, APTT    HCG (If Applicable): No results found for: PREGTESTUR, PREGSERUM, HCG, HCGQUANT     ABGs: No results found for: PHART, PO2ART, BGU2LUG, ROW1UIF, BEART, D6PDIGJH     Type & Screen (If Applicable):  No results found for: LABABO, LABRH    Drug/Infectious Status (If Applicable):  No results found for: HIV, HEPCAB    COVID-19 Screening (If Applicable):   Lab Results   Component Value Date    COVID19 Not Detected 05/05/2021           Anesthesia Evaluation  Patient summary reviewed and Nursing notes reviewed no history of anesthetic complications:   Airway: Mallampati: II  TM distance: >3 FB   Neck ROM: full  Mouth opening: > = 3 FB Dental:          Pulmonary:Negative Pulmonary ROS and normal exam

## 2021-05-11 NOTE — MANAGEMENT PLAN
Called patient . Less lumbar pain since  Kyphoplasties L2, L4  Yesterday. Patient to eileen if concerns. No Follow up office visit made. Discussed  Consider  Virtual but does not have smart phone. Remind one of us to  Call patient in a couple of  Weeks. Thank You

## 2021-07-30 ENCOUNTER — TELEPHONE (OUTPATIENT)
Dept: INTERNAL MEDICINE CLINIC | Age: 81
End: 2021-07-30

## 2021-07-30 ENCOUNTER — HOSPITAL ENCOUNTER (EMERGENCY)
Age: 81
Discharge: ANOTHER HOSPITAL | End: 2021-07-30
Attending: EMERGENCY MEDICINE
Payer: MEDICARE

## 2021-07-30 ENCOUNTER — APPOINTMENT (OUTPATIENT)
Dept: GENERAL RADIOLOGY | Age: 81
End: 2021-07-30
Payer: MEDICARE

## 2021-07-30 ENCOUNTER — APPOINTMENT (OUTPATIENT)
Dept: CT IMAGING | Age: 81
End: 2021-07-30
Payer: MEDICARE

## 2021-07-30 VITALS
OXYGEN SATURATION: 96 % | SYSTOLIC BLOOD PRESSURE: 136 MMHG | RESPIRATION RATE: 15 BRPM | HEART RATE: 71 BPM | TEMPERATURE: 99.2 F | DIASTOLIC BLOOD PRESSURE: 60 MMHG | BODY MASS INDEX: 20.49 KG/M2 | WEIGHT: 112 LBS

## 2021-07-30 DIAGNOSIS — I48.91 ATRIAL FIBRILLATION, UNSPECIFIED TYPE (HCC): Primary | ICD-10-CM

## 2021-07-30 DIAGNOSIS — S20.229A CONTUSION OF BACK, UNSPECIFIED LATERALITY, INITIAL ENCOUNTER: ICD-10-CM

## 2021-07-30 DIAGNOSIS — N39.0 URINARY TRACT INFECTION WITHOUT HEMATURIA, SITE UNSPECIFIED: ICD-10-CM
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
## 2021-07-30 LAB
-: ABNORMAL
ABSOLUTE EOS #: 0.12 K/UL (ref 0–0.44)
ABSOLUTE IMMATURE GRANULOCYTE: 0.09 K/UL (ref 0–0.3)
ABSOLUTE LYMPH #: 0.88 K/UL (ref 1.1–3.7)
ABSOLUTE MONO #: 0.67 K/UL (ref 0.1–1.2)
ALBUMIN SERPL-MCNC: 3.6 G/DL (ref 3.5–5.2)
ALBUMIN/GLOBULIN RATIO: 1.4 (ref 1–2.5)
ALP BLD-CCNC: 69 U/L (ref 35–104)
ALT SERPL-CCNC: 9 U/L (ref 5–33)
AMORPHOUS: ABNORMAL
ANION GAP SERPL CALCULATED.3IONS-SCNC: 11 MMOL/L (ref 9–17)
AST SERPL-CCNC: 16 U/L
BACTERIA: ABNORMAL
BASOPHILS # BLD: 1 % (ref 0–2)
BASOPHILS ABSOLUTE: 0.09 K/UL (ref 0–0.2)
BILIRUB SERPL-MCNC: 1.78 MG/DL (ref 0.3–1.2)
BILIRUBIN URINE: ABNORMAL
BNP INTERPRETATION: ABNORMAL
BUN BLDV-MCNC: 13 MG/DL (ref 8–23)
BUN/CREAT BLD: 22 (ref 9–20)
CALCIUM SERPL-MCNC: 8.8 MG/DL (ref 8.6–10.4)
CASTS UA: ABNORMAL /LPF (ref 0–2)
CHLORIDE BLD-SCNC: 103 MMOL/L (ref 98–107)
CO2: 24 MMOL/L (ref 20–31)
COLOR: ABNORMAL
COMMENT UA: ABNORMAL
CREAT SERPL-MCNC: 0.58 MG/DL (ref 0.5–0.9)
CRYSTALS, UA: ABNORMAL /HPF
D-DIMER QUANTITATIVE: 1.37 MG/L FEU (ref 0–0.59)
DIFFERENTIAL TYPE: ABNORMAL
EOSINOPHILS RELATIVE PERCENT: 1 % (ref 1–4)
EPITHELIAL CELLS UA: ABNORMAL /HPF (ref 0–5)
GFR AFRICAN AMERICAN: >60 ML/MIN
GFR NON-AFRICAN AMERICAN: >60 ML/MIN
GFR SERPL CREATININE-BSD FRML MDRD: ABNORMAL ML/MIN/{1.73_M2}
GFR SERPL CREATININE-BSD FRML MDRD: ABNORMAL ML/MIN/{1.73_M2}
GLUCOSE BLD-MCNC: 105 MG/DL (ref 70–99)
GLUCOSE URINE: NEGATIVE
HCT VFR BLD CALC: 32.9 % (ref 36.3–47.1)
HEMOGLOBIN: 10.7 G/DL (ref 11.9–15.1)
IMMATURE GRANULOCYTES: 1 %
KETONES, URINE: ABNORMAL
LEUKOCYTE ESTERASE, URINE: ABNORMAL
LV EF: 78 %
LVEF MODALITY: NORMAL
LYMPHOCYTES # BLD: 9 % (ref 24–43)
MCH RBC QN AUTO: 32.4 PG (ref 25.2–33.5)
MCHC RBC AUTO-ENTMCNC: 32.5 G/DL (ref 25.2–33.5)
MCV RBC AUTO: 99.7 FL (ref 82.6–102.9)
MONOCYTES # BLD: 7 % (ref 3–12)
MUCUS: ABNORMAL
NITRITE, URINE: NEGATIVE
NRBC AUTOMATED: 0 PER 100 WBC
OTHER OBSERVATIONS UA: ABNORMAL
PDW BLD-RTO: 16.1 % (ref 11.8–14.4)
PH UA: 5 (ref 5–6)
PLATELET # BLD: 126 K/UL (ref 138–453)
PLATELET ESTIMATE: ABNORMAL
PMV BLD AUTO: 12.8 FL (ref 8.1–13.5)
POTASSIUM SERPL-SCNC: 4.2 MMOL/L (ref 3.7–5.3)
PRO-BNP: 1498 PG/ML
PROTEIN UA: NEGATIVE
RBC # BLD: 3.3 M/UL (ref 3.95–5.11)
RBC # BLD: ABNORMAL 10*6/UL
RBC UA: ABNORMAL /HPF (ref 0–4)
RENAL EPITHELIAL, UA: ABNORMAL /HPF
SEG NEUTROPHILS: 81 % (ref 36–65)
SEGMENTED NEUTROPHILS ABSOLUTE COUNT: 7.68 K/UL (ref 1.5–8.1)
SODIUM BLD-SCNC: 138 MMOL/L (ref 135–144)
SPECIFIC GRAVITY UA: 1.03 (ref 1.01–1.02)
TOTAL PROTEIN: 6.2 G/DL (ref 6.4–8.3)
TRICHOMONAS: ABNORMAL
TROPONIN INTERP: NORMAL
TROPONIN T: NORMAL NG/ML
TROPONIN, HIGH SENSITIVITY: 12 NG/L (ref 0–14)
TURBIDITY: ABNORMAL
URINE HGB: NEGATIVE
UROBILINOGEN, URINE: NORMAL
WBC # BLD: 9.5 K/UL (ref 3.5–11.3)
WBC # BLD: ABNORMAL 10*3/UL
WBC UA: ABNORMAL /HPF (ref 0–4)
YEAST: ABNORMAL

## 2021-07-30 PROCEDURE — 85379 FIBRIN DEGRADATION QUANT: CPT

## 2021-07-30 PROCEDURE — 96372 THER/PROPH/DIAG INJ SC/IM: CPT

## 2021-07-30 PROCEDURE — 93005 ELECTROCARDIOGRAM TRACING: CPT | Performed by: EMERGENCY MEDICINE

## 2021-07-30 PROCEDURE — 80053 COMPREHEN METABOLIC PANEL: CPT

## 2021-07-30 PROCEDURE — 2709999900 CT CHEST PULMONARY EMBOLISM W CONTRAST

## 2021-07-30 PROCEDURE — 83880 ASSAY OF NATRIURETIC PEPTIDE: CPT

## 2021-07-30 PROCEDURE — 85025 COMPLETE CBC W/AUTO DIFF WBC: CPT

## 2021-07-30 PROCEDURE — 36415 COLL VENOUS BLD VENIPUNCTURE: CPT

## 2021-07-30 PROCEDURE — 6360000004 HC RX CONTRAST MEDICATION: Performed by: EMERGENCY MEDICINE

## 2021-07-30 PROCEDURE — 93306 TTE W/DOPPLER COMPLETE: CPT

## 2021-07-30 PROCEDURE — 72100 X-RAY EXAM L-S SPINE 2/3 VWS: CPT

## 2021-07-30 PROCEDURE — 6360000002 HC RX W HCPCS: Performed by: EMERGENCY MEDICINE

## 2021-07-30 PROCEDURE — 71046 X-RAY EXAM CHEST 2 VIEWS: CPT

## 2021-07-30 PROCEDURE — 72070 X-RAY EXAM THORAC SPINE 2VWS: CPT

## 2021-07-30 PROCEDURE — 2580000003 HC RX 258: Performed by: EMERGENCY MEDICINE

## 2021-07-30 PROCEDURE — 84484 ASSAY OF TROPONIN QUANT: CPT

## 2021-07-30 PROCEDURE — 6370000000 HC RX 637 (ALT 250 FOR IP): Performed by: EMERGENCY MEDICINE

## 2021-07-30 PROCEDURE — 99285 EMERGENCY DEPT VISIT HI MDM: CPT

## 2021-07-30 PROCEDURE — 87086 URINE CULTURE/COLONY COUNT: CPT

## 2021-07-30 PROCEDURE — 81001 URINALYSIS AUTO W/SCOPE: CPT

## 2021-07-30 RX ORDER — SODIUM CHLORIDE 9 MG/ML
1000 INJECTION, SOLUTION INTRAVENOUS CONTINUOUS
Status: DISCONTINUED | OUTPATIENT
Start: 2021-07-30 | End: 2021-07-31 | Stop reason: HOSPADM

## 2021-07-30 RX ORDER — TRAMADOL HYDROCHLORIDE 50 MG/1
50 TABLET ORAL ONCE
Status: COMPLETED | OUTPATIENT
Start: 2021-07-30 | End: 2021-07-30

## 2021-07-30 RX ADMIN — TRAMADOL HYDROCHLORIDE 50 MG: 50 TABLET, FILM COATED ORAL at 20:52

## 2021-07-30 RX ADMIN — ENOXAPARIN SODIUM 50 MG: 60 INJECTION SUBCUTANEOUS at 15:35

## 2021-07-30 RX ADMIN — SODIUM CHLORIDE 1000 ML: 9 INJECTION, SOLUTION INTRAVENOUS at 13:03

## 2021-07-30 RX ADMIN — IOPAMIDOL 80 ML: 755 INJECTION, SOLUTION INTRAVENOUS at 12:55

## 2021-07-30 ASSESSMENT — PAIN SCALES - GENERAL
PAINLEVEL_OUTOF10: 4
PAINLEVEL_OUTOF10: 5

## 2021-07-30 ASSESSMENT — PAIN DESCRIPTION - FREQUENCY: FREQUENCY: CONTINUOUS

## 2021-07-30 ASSESSMENT — PAIN DESCRIPTION - ONSET: ONSET: SUDDEN

## 2021-07-30 ASSESSMENT — ENCOUNTER SYMPTOMS
SHORTNESS OF BREATH: 0
ABDOMINAL PAIN: 0
BACK PAIN: 1

## 2021-07-30 ASSESSMENT — PAIN DESCRIPTION - ORIENTATION: ORIENTATION: LOWER

## 2021-07-30 ASSESSMENT — PAIN DESCRIPTION - PAIN TYPE: TYPE: ACUTE PAIN

## 2021-07-30 ASSESSMENT — PAIN DESCRIPTION - LOCATION: LOCATION: BACK

## 2021-07-30 ASSESSMENT — PAIN DESCRIPTION - PROGRESSION: CLINICAL_PROGRESSION: NOT CHANGED

## 2021-07-30 NOTE — TELEPHONE ENCOUNTER
Called by ER physician regarding possible admission--2D ECHO requested and completed---patient being transferred to  [place] [address] per Cardiology----chart reviewed and copied for review of case before decision to transfer.    janet

## 2021-07-30 NOTE — ED PROVIDER NOTES
[facility] ED  EMERGENCY DEPARTMENT ENCOUNTER      Pt Name: Paola Jordan  MRN: [id number]  [place] 1940  Date of evaluation: 7/30/2021  Provider: Katharina Ledesma MD    CHIEF COMPLAINT     Chief Complaint   Patient presents with    Back Pain     lower, fell outside while hanging up laundry a couple days ago         HISTORY OF PRESENT ILLNESS   (Location/Symptom, Timing/Onset, Context/Setting,Quality, Duration, Modifying Factors, Severity)  Note limiting factors. Paola Jordan is a 78 y.o. female who presents to the emergency department stating a couple days ago while she was putting out her laundry to dry she lost her balance and fell back. She has had pain in the mid and lower back since then but has continued to ambulate. Patient has a history of osteoporosis and pathological fractures and has undergone kyphoplasty at different levels of the thoracic and lumbar spines in the past.  She lives alone. The history is provided by the patient and medical records. Nursing Notes werereviewed. REVIEW OF SYSTEMS    (2-9 systems for level 4, 10 or more for level 5)     Review of Systems   Constitutional: Negative for chills and fever. Respiratory: Negative for shortness of breath. Cardiovascular: Negative for chest pain. Gastrointestinal: Negative for abdominal pain. Genitourinary: Negative for dysuria. Musculoskeletal: Positive for arthralgias, back pain and gait problem. All other systems reviewed and are negative. Except as noted above the remainder of the review of systems was reviewed and negative.        PAST MEDICAL HISTORY     Past Medical History:   Diagnosis Date    Chronic back pain     Neuropathy     Stroke ([facility] [address]     x2 in 2009 and x1 in 2010         SURGICALHISTORY       Past Surgical History:   Procedure Laterality Date    ABDOMEN SURGERY  1995    bowel resection for diverticulosis    APPENDECTOMY      FIXATION KYPHOPLASTY  7/3/14    T 12    HYSTERECTOMY Difficulty of Paying Living Expenses:    Food Insecurity:     Worried About Running Out of Food in the Last Year:     [address] in the Last Year:    Transportation Needs:     Lack of Transportation (Medical):  Lack of Transportation (Non-Medical):    Physical Activity:     Days of Exercise per Week:     Minutes of Exercise per Session:    Stress:     Feeling of Stress :    Social Connections:     Frequency of Communication with Friends and Family:     Frequency of Social Gatherings with Friends and Family:     Attends Denominational Services:     Active Member of Clubs or Organizations:     Attends Club or Organization Meetings:     Marital Status:    Intimate Partner Violence:     Fear of Current or Ex-Partner:     Emotionally Abused:     Physically Abused:     Sexually Abused:        SCREENINGS    [place] Coma Scale  Eye Opening: Spontaneous  Best Verbal Response: Oriented  Best Motor Response: Obeys commands  Deidra Coma Scale Score: 15        PHYSICAL EXAM    (up to 7 for level 4, 8 or more for level 5)     ED Triage Vitals [07/30/21 1021]   BP Temp Temp Source Pulse Resp SpO2 Height Weight   129/63 99.2 °F (37.3 °C) Tympanic 78 16 98 % -- --       Physical Exam  Vitals reviewed. Constitutional:       General: She is not in acute distress. Appearance: She is not ill-appearing. HENT:      Head: Normocephalic. Right Ear: External ear normal.      Left Ear: External ear normal.      Nose: Nose normal.   Eyes:      Extraocular Movements: Extraocular movements intact. Cardiovascular:      Rate and Rhythm: Normal rate. Rhythm irregular. Pulmonary:      Effort: Pulmonary effort is normal.      Breath sounds: Normal breath sounds. No rhonchi or rales. Abdominal:      Palpations: Abdomen is soft. Musculoskeletal:      Cervical back: Neck supple. Comments: No bruises noted on the back. Patient localizes tenderness diffusely across the lower thoracic and upper lumbar spine. W/ REFLEX TO MG FOR LOW K - Abnormal; Notable for the following components:    Glucose 105 (*)     Bun/Cre Ratio 22 (*)     Total Bilirubin 1.78 (*)     Total Protein 6.2 (*)     All other components within normal limits   URINE RT REFLEX TO CULTURE - Abnormal; Notable for the following components:    Bilirubin Urine 1+ (*)     Ketones, Urine 1+ (*)     Specific Gravity, UA 1.030 (*)     Leukocyte Esterase, Urine TRACE (*)     All other components within normal limits   BRAIN NATRIURETIC PEPTIDE - Abnormal; Notable for the following components:    Pro-BNP 1,498 (*)     All other components within normal limits   D-DIMER, QUANTITATIVE - Abnormal; Notable for the following components:    D-Dimer, Quant 1.37 (*)     All other components within normal limits   MICROSCOPIC URINALYSIS - Abnormal; Notable for the following components:    Bacteria, UA 2+ (*)     Mucus, UA 4+ (*)     Amorphous, UA 2+ (*)     Other Observations UA Specimen Cultured (*)     All other components within normal limits   CULTURE, URINE   TROPONIN       All other labs were within normal range ornot returned as of this dictation. EMERGENCY DEPARTMENT COURSE and DIFFERENTIAL DIAGNOSIS/MDM:   Vitals:    Vitals:    07/30/21 1509 07/30/21 1540 07/30/21 1600 07/30/21 1700   BP:  (!) 111/92 105/62 (!) 102/37   Pulse:  80 82 79   Resp:  19 19 16   Temp:       TempSrc:       SpO2:  96% 96% 95%   Weight: 112 lb (50.8 kg)               Plain images of the thoracic and lumbar spines show degenerative changes and mild compression of a few vertebrae with no significant acute findings. CT scan of the chest is negative for any large central pulmonary emboli. Patient had a 2D echocardiogram performed in the ED which shows significant mitral regurgitation. Dr. Sparkle Adams recommends transfer to [address] [place] [address] for further management.   She was anticoagulated with a single dose of Lovenox 1 mg/kg subcutaneously. MDM    CONSULTS:  None    PROCEDURES:  Unlessotherwise noted below, none     Procedures    FINAL IMPRESSION      1. Atrial fibrillation, unspecified type ([address]    2. Fall, initial encounter    3. Contusion of back, unspecified laterality, initial encounter    4. Urinary tract infection without hematuria, site unspecified          DISPOSITION/PLAN   DISPOSITION Decision To Transfer 07/30/2021 03:14:11 PM      PATIENT REFERRED TO:  No follow-up provider specified. DISCHARGE MEDICATIONS:         Problem List:  Patient Active Problem List   Diagnosis Code    Compression fracture of T12 vertebra ([address] S22.080A    Osteoporosis with pathological fracture M80.00XA    New onset atrial fibrillation ([address] I48.91           Summation      Patient Course: Transferred. ED Medicationsadministered this visit:    Medications   0.9 % sodium chloride infusion (1,000 mLs Intravenous New Bag 7/30/21 1303)   iopamidol (ISOVUE-370) 76 % injection 80 mL (80 mLs Intravenous Given 7/30/21 1255)   enoxaparin (LOVENOX) injection 50 mg (50 mg Subcutaneous Given 7/30/21 1535)       New Prescriptions from this visit:    New Prescriptions    No medications on file       Follow-up:  No follow-up provider specified. Final Impression:   1. Atrial fibrillation, unspecified type ([address]    2. Fall, initial encounter    3. Contusion of back, unspecified laterality, initial encounter    4.  Urinary tract infection without hematuria, site unspecified               (Please note that portions of this note were completed with a voice recognitionprogram.  Efforts were made to edit the dictations but occasionally words are mis-transcribed.)    Tiffany Kim MD (electronically signed)  Attending Emergency Physician            Tiffany Kim MD  07/30/21 Winsome Womack

## 2021-07-31 ENCOUNTER — HOSPITAL ENCOUNTER (INPATIENT)
Age: 81
LOS: 3 days | Discharge: HOME | DRG: 310 | End: 2021-08-03
Attending: INTERNAL MEDICINE
Payer: MEDICARE

## 2021-07-31 DIAGNOSIS — Z79.01 ANTICOAGULATED ON COUMADIN: Primary | ICD-10-CM

## 2021-07-31 PROBLEM — D64.9 NORMOCYTIC NORMOCHROMIC ANEMIA: Status: ACTIVE | Noted: 2021-07-31
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
## 2021-07-31 PROBLEM — I10 ESSENTIAL HYPERTENSION: Status: ACTIVE | Noted: 2021-07-31

## 2021-07-31 PROBLEM — I34.0 NONRHEUMATIC MITRAL VALVE REGURGITATION: Status: ACTIVE | Noted: 2021-07-31

## 2021-07-31 PROBLEM — F32.A DEPRESSION: Status: ACTIVE | Noted: 2021-07-31

## 2021-07-31 LAB
ABSOLUTE RETIC #: 0.07 M/UL (ref 0.03–0.08)
ALBUMIN SERPL-MCNC: 3.4 G/DL (ref 3.5–5.2)
ALBUMIN/GLOBULIN RATIO: 1.4 (ref 1–2.5)
ALP BLD-CCNC: 62 U/L (ref 35–104)
ALT SERPL-CCNC: 8 U/L (ref 5–33)
ANION GAP SERPL CALCULATED.3IONS-SCNC: 14 MMOL/L (ref 9–17)
AST SERPL-CCNC: 16 U/L
BILIRUB SERPL-MCNC: 1.27 MG/DL (ref 0.3–1.2)
BNP INTERPRETATION: ABNORMAL
BUN BLDV-MCNC: 10 MG/DL (ref 8–23)
BUN/CREAT BLD: ABNORMAL (ref 9–20)
CALCIUM SERPL-MCNC: 8.6 MG/DL (ref 8.6–10.4)
CHLORIDE BLD-SCNC: 107 MMOL/L (ref 98–107)
CO2: 20 MMOL/L (ref 20–31)
CREAT SERPL-MCNC: 0.4 MG/DL (ref 0.5–0.9)
CULTURE: NORMAL
EKG ATRIAL RATE: 70 BPM
EKG P AXIS: -6 DEGREES
EKG P-R INTERVAL: 134 MS
EKG Q-T INTERVAL: 434 MS
EKG QRS DURATION: 80 MS
EKG QTC CALCULATION (BAZETT): 468 MS
EKG R AXIS: -9 DEGREES
EKG T AXIS: 59 DEGREES
EKG VENTRICULAR RATE: 70 BPM
FERRITIN: 326 UG/L (ref 13–150)
GFR AFRICAN AMERICAN: >60 ML/MIN
GFR NON-AFRICAN AMERICAN: >60 ML/MIN
GFR SERPL CREATININE-BSD FRML MDRD: ABNORMAL ML/MIN/{1.73_M2}
GFR SERPL CREATININE-BSD FRML MDRD: ABNORMAL ML/MIN/{1.73_M2}
GLUCOSE BLD-MCNC: 97 MG/DL (ref 70–99)
IMMATURE RETIC FRACT: 24.8 % (ref 2.7–18.3)
INR BLD: 1
IRON SATURATION: 16 % (ref 20–55)
IRON: 37 UG/DL (ref 37–145)
Lab: NORMAL
MAGNESIUM: 2 MG/DL (ref 1.6–2.6)
POTASSIUM SERPL-SCNC: 4.1 MMOL/L (ref 3.7–5.3)
PRO-BNP: 1845 PG/ML
PROTHROMBIN TIME: 10.8 SEC (ref 9.1–12.3)
RETIC %: 2.1 % (ref 0.5–1.9)
RETIC HEMOGLOBIN: 33.4 PG (ref 28.2–35.7)
SODIUM BLD-SCNC: 141 MMOL/L (ref 135–144)
SPECIMEN DESCRIPTION: NORMAL
TOTAL IRON BINDING CAPACITY: 237 UG/DL (ref 250–450)
TOTAL PROTEIN: 5.8 G/DL (ref 6.4–8.3)
TROPONIN INTERP: NORMAL
TROPONIN T: NORMAL NG/ML
TROPONIN, HIGH SENSITIVITY: 12 NG/L (ref 0–14)
TSH SERPL DL<=0.05 MIU/L-ACNC: 1.88 MIU/L (ref 0.3–5)
UNSATURATED IRON BINDING CAPACITY: 200 UG/DL (ref 112–347)

## 2021-07-31 PROCEDURE — 93010 ELECTROCARDIOGRAM REPORT: CPT | Performed by: INTERNAL MEDICINE

## 2021-07-31 PROCEDURE — 97162 PT EVAL MOD COMPLEX 30 MIN: CPT

## 2021-07-31 PROCEDURE — 85045 AUTOMATED RETICULOCYTE COUNT: CPT

## 2021-07-31 PROCEDURE — 97116 GAIT TRAINING THERAPY: CPT

## 2021-07-31 PROCEDURE — 2580000003 HC RX 258: Performed by: NURSE PRACTITIONER

## 2021-07-31 PROCEDURE — 36415 COLL VENOUS BLD VENIPUNCTURE: CPT

## 2021-07-31 PROCEDURE — 6360000002 HC RX W HCPCS: Performed by: NURSE PRACTITIONER

## 2021-07-31 PROCEDURE — 6370000000 HC RX 637 (ALT 250 FOR IP): Performed by: STUDENT IN AN ORGANIZED HEALTH CARE EDUCATION/TRAINING PROGRAM

## 2021-07-31 PROCEDURE — 83880 ASSAY OF NATRIURETIC PEPTIDE: CPT

## 2021-07-31 PROCEDURE — 85610 PROTHROMBIN TIME: CPT

## 2021-07-31 PROCEDURE — 97530 THERAPEUTIC ACTIVITIES: CPT

## 2021-07-31 PROCEDURE — 84443 ASSAY THYROID STIM HORMONE: CPT

## 2021-07-31 PROCEDURE — 99223 1ST HOSP IP/OBS HIGH 75: CPT | Performed by: FAMILY MEDICINE

## 2021-07-31 PROCEDURE — 6370000000 HC RX 637 (ALT 250 FOR IP): Performed by: NURSE PRACTITIONER

## 2021-07-31 PROCEDURE — 83540 ASSAY OF IRON: CPT

## 2021-07-31 PROCEDURE — 83550 IRON BINDING TEST: CPT

## 2021-07-31 PROCEDURE — 82728 ASSAY OF FERRITIN: CPT

## 2021-07-31 PROCEDURE — APPSS45 APP SPLIT SHARED TIME 31-45 MINUTES: Performed by: NURSE PRACTITIONER

## 2021-07-31 PROCEDURE — 83735 ASSAY OF MAGNESIUM: CPT

## 2021-07-31 PROCEDURE — 80053 COMPREHEN METABOLIC PANEL: CPT

## 2021-07-31 PROCEDURE — 93005 ELECTROCARDIOGRAM TRACING: CPT | Performed by: NURSE PRACTITIONER

## 2021-07-31 PROCEDURE — 2060000000 HC ICU INTERMEDIATE R&B

## 2021-07-31 PROCEDURE — 84484 ASSAY OF TROPONIN QUANT: CPT

## 2021-07-31 RX ORDER — SODIUM CHLORIDE 9 MG/ML
25 INJECTION, SOLUTION INTRAVENOUS PRN
Status: DISCONTINUED | OUTPATIENT
Start: 2021-07-31 | End: 2021-08-03 | Stop reason: HOSPADM

## 2021-07-31 RX ORDER — CITALOPRAM 20 MG/1
20 TABLET ORAL DAILY
Status: DISCONTINUED | OUTPATIENT
Start: 2021-07-31 | End: 2021-08-03 | Stop reason: HOSPADM

## 2021-07-31 RX ORDER — SODIUM CHLORIDE 0.9 % (FLUSH) 0.9 %
5-40 SYRINGE (ML) INJECTION EVERY 12 HOURS SCHEDULED
Status: DISCONTINUED | OUTPATIENT
Start: 2021-07-31 | End: 2021-08-03 | Stop reason: HOSPADM

## 2021-07-31 RX ORDER — ONDANSETRON 4 MG/1
4 TABLET, ORALLY DISINTEGRATING ORAL EVERY 8 HOURS PRN
Status: DISCONTINUED | OUTPATIENT
Start: 2021-07-31 | End: 2021-08-03 | Stop reason: HOSPADM

## 2021-07-31 RX ORDER — SODIUM CHLORIDE 0.9 % (FLUSH) 0.9 %
10 SYRINGE (ML) INJECTION PRN
Status: DISCONTINUED | OUTPATIENT
Start: 2021-07-31 | End: 2021-08-03 | Stop reason: HOSPADM

## 2021-07-31 RX ORDER — POLYETHYLENE GLYCOL 3350 17 G/17G
17 POWDER, FOR SOLUTION ORAL DAILY PRN
Status: DISCONTINUED | OUTPATIENT
Start: 2021-07-31 | End: 2021-08-03 | Stop reason: HOSPADM

## 2021-07-31 RX ORDER — FUROSEMIDE 20 MG/1
20 TABLET ORAL DAILY
Status: DISCONTINUED | OUTPATIENT
Start: 2021-07-31 | End: 2021-08-03 | Stop reason: HOSPADM

## 2021-07-31 RX ORDER — ONDANSETRON 2 MG/ML
4 INJECTION INTRAMUSCULAR; INTRAVENOUS EVERY 6 HOURS PRN
Status: DISCONTINUED | OUTPATIENT
Start: 2021-07-31 | End: 2021-08-03 | Stop reason: HOSPADM

## 2021-07-31 RX ORDER — ACETAMINOPHEN 650 MG/1
650 SUPPOSITORY RECTAL EVERY 6 HOURS PRN
Status: DISCONTINUED | OUTPATIENT
Start: 2021-07-31 | End: 2021-08-03 | Stop reason: HOSPADM

## 2021-07-31 RX ORDER — ACETAMINOPHEN 325 MG/1
650 TABLET ORAL EVERY 6 HOURS PRN
Status: DISCONTINUED | OUTPATIENT
Start: 2021-07-31 | End: 2021-08-03 | Stop reason: HOSPADM

## 2021-07-31 RX ORDER — CLOPIDOGREL BISULFATE 75 MG/1
75 TABLET ORAL DAILY
Status: DISCONTINUED | OUTPATIENT
Start: 2021-07-31 | End: 2021-08-03

## 2021-07-31 RX ADMIN — SODIUM CHLORIDE, PRESERVATIVE FREE 10 ML: 5 INJECTION INTRAVENOUS at 22:07

## 2021-07-31 RX ADMIN — ENOXAPARIN SODIUM 50 MG: 60 INJECTION SUBCUTANEOUS at 09:20

## 2021-07-31 RX ADMIN — SODIUM CHLORIDE, PRESERVATIVE FREE 10 ML: 5 INJECTION INTRAVENOUS at 13:41

## 2021-07-31 RX ADMIN — CITALOPRAM 20 MG: 20 TABLET, FILM COATED ORAL at 09:20

## 2021-07-31 RX ADMIN — METOPROLOL TARTRATE 12.5 MG: 25 TABLET ORAL at 09:19

## 2021-07-31 RX ADMIN — ENOXAPARIN SODIUM 50 MG: 60 INJECTION SUBCUTANEOUS at 21:46

## 2021-07-31 RX ADMIN — FUROSEMIDE 20 MG: 20 TABLET ORAL at 13:24

## 2021-07-31 RX ADMIN — CLOPIDOGREL 75 MG: 75 TABLET, FILM COATED ORAL at 09:20

## 2021-07-31 RX ADMIN — METOPROLOL TARTRATE 12.5 MG: 25 TABLET ORAL at 21:45

## 2021-07-31 ASSESSMENT — ENCOUNTER SYMPTOMS
PHOTOPHOBIA: 0
ABDOMINAL PAIN: 0
SORE THROAT: 0
COUGH: 0
NAUSEA: 0
TROUBLE SWALLOWING: 0
SHORTNESS OF BREATH: 0
BACK PAIN: 1
WHEEZING: 0
CHEST TIGHTNESS: 0
ABDOMINAL DISTENTION: 0
VOMITING: 0

## 2021-07-31 ASSESSMENT — PAIN SCALES - GENERAL
PAINLEVEL_OUTOF10: 0

## 2021-07-31 NOTE — CONSULTS
Attestation signed by      Attending Physician Statement:    I have discussed the care of  Lady Jovel , including pertinent history and exam findings, with the Cardiology fellow/resident. I have seen and examined the patient and the key elements of all parts of the encounter have been performed by me. I agree with the assessment, plan and orders as documented by the fellow/resident, after I modified exam findings and plan of treatments, and the final version is my approved version of the assessment. Additional Comments:   New onset Severe MR (I personally reviewed echo, was a bit challenging, but I question if there is HOCM/LIDYA that is causing her Severe eccentric posterior directed MR)  S/p Fall- mechanical  New onset AF- rate controlled  - continue lopressor, heparin gtt  - on plavix from prior ? Due to old CVA  - plan for DARRIN on Monday, if severe MR not due to HOCM / LIDYA, proceed with R and L Heart Cath. If MR is < severe - treat medically. Andres Brownlee DO, [facility], [address], [address] [facility]  [facility] [employer]  [phone]              [facility]    Consult / H&P               Today's Date: 7/31/2021  Patient Name: Lady Jovel  Date of admission: 7/31/2021  1:08 AM  Patient's age: 78 y. o., 1940  Admission Dx: New onset atrial fibrillation ([facility] [address] [I48.91]    Reason for Consult:  Cardiac evaluation    Requesting Physician: Mook Roy MD    CHIEF COMPLAINT: Status post fall. History Obtained From:  patient, electronic medical record    HISTORY OF PRESENT ILLNESS:      The patient is a 78 y.o. female PMH of HTN/HLD/CAD presenting from [facility] ER status post fall. As per the patient she was hanging clothes on the clock time and she lost her balance and fell back. No associated dizziness/lightheadedness/chest pain/palpitations/loss of consciousness. Initial EKG showing A. fib with RVR with bedside echo in the ER showing some significant MR. Transferred to [facility] for further work-up. Past Medical History:   has a past medical history of Chronic back pain, Neuropathy, and Stroke ([facility] [address] Past Surgical History:   has a past surgical history that includes Hysterectomy; Appendectomy; Abdomen surgery (1995); Fixation Kyphoplasty (7/3/14); and Spine surgery (N/A, 5/10/2021). Home Medications:    Prior to Admission medications    Medication Sig Start Date End Date Taking? Authorizing Provider   alendronate (FOSAMAX) 70 MG tablet Take 70 mg by mouth once a week 8/20/20 8/20/21 Yes Historical Provider, MD   gabapentin (NEURONTIN) 100 MG capsule Take 2 capsules by mouth 3 times daily. 3/5/21  Yes Historical Provider, MD   traMADol (ULTRAM) 50 MG tablet Take 1 tablet by mouth as needed. 4/24/21  Yes Historical Provider, MD   vitamin D3 (CHOLECALCIFEROL) 1000 UNITS TABS tablet Take 1,000 Units by mouth 2 times daily. Yes Historical Provider, MD   diphenhydrAMINE-APAP, sleep, (TYLENOL PM EXTRA STRENGTH)  MG tablet Take 1 tablet by mouth nightly as needed for Sleep    Historical Provider, MD   sodium chloride () 5 % ophthalmic solution Apply 1 drop to eye 3 times daily    Historical Provider, MD   acetaminophen (TYLENOL 8 HOUR ARTHRITIS PAIN) 650 MG extended release tablet Take 650 mg by mouth every 8 hours as needed for Pain    Historical Provider, MD   citalopram (CELEXA) 20 MG tablet Take 20 mg by mouth daily. Historical Provider, MD   clopidogrel (PLAVIX) 75 MG tablet Take 75 mg by mouth daily. Historical Provider, MD   b complex vitamins capsule Take 1 capsule by mouth daily. Historical Provider, MD   Pyridoxine HCl (VITAMIN B-6) 50 MG tablet Take 50 mg by mouth daily. Historical Provider, MD   Valerian Root 100 MG CAPS Take  by mouth nightly.     Historical Provider, MD   Alum Hydroxide-Mag Trisilicate (GAVISCON) 44-79.1 MG CHEW Take by mouth as needed     Historical Provider, MD      Current Facility-Administered Medications: clopidogrel (PLAVIX) tablet 75 mg, 75 mg, Oral, Daily  citalopram (CELEXA) tablet 20 mg, 20 mg, Oral, Daily  sodium chloride flush 0.9 % injection 5-40 mL, 5-40 mL, Intravenous, 2 times per day  sodium chloride flush 0.9 % injection 10 mL, 10 mL, Intravenous, PRN  0.9 % sodium chloride infusion, 25 mL, Intravenous, PRN  ondansetron (ZOFRAN-ODT) disintegrating tablet 4 mg, 4 mg, Oral, Q8H PRN **OR** ondansetron (ZOFRAN) injection 4 mg, 4 mg, Intravenous, Q6H PRN  polyethylene glycol (GLYCOLAX) packet 17 g, 17 g, Oral, Daily PRN  acetaminophen (TYLENOL) tablet 650 mg, 650 mg, Oral, Q6H PRN **OR** acetaminophen (TYLENOL) suppository 650 mg, 650 mg, Rectal, Q6H PRN  enoxaparin (LOVENOX) injection 50 mg, 1 mg/kg, Subcutaneous, BID  metoprolol tartrate (LOPRESSOR) tablet 12.5 mg, 12.5 mg, Oral, BID    Allergies:  Asa [aspirin], Codeine, and Pcn [penicillins]    Social History:   reports that she has never smoked. She has never used smokeless tobacco. She reports that she does not drink alcohol and does not use drugs. Family History: family history is not on file. REVIEW OF SYSTEMS:    Constitutional: S/P fall. Eyes: No visual changes or diplopia. No scleral icterus. ENT: No Headaches  Cardiovascular: As above. Respiratory: No previous pulmonary problems, No cough  Gastrointestinal: No abdominal pain. No change in bowel or bladder habits. Genitourinary: No dysuria, trouble voiding, or hematuria. Musculoskeletal:  No gait disturbance, No weakness or joint complaints. Integumentary: No rash or pruritis. Neurological: No headache, diplopia, change in muscle strength, numbness or tingling. No change in gait, balance, coordination, mood, affect, memory, mentation, behavior. Psychiatric: No anxiety, or depression. Endocrine: No temperature intolerance. No excessive thirst, fluid intake, or urination. No tremor.   Hematologic/Lymphatic: No abnormal bruising or bleeding, blood clots or swollen lymph nodes. Allergic/Immunologic: No nasal congestion or hives. PHYSICAL EXAM:      BP (!) 145/90   Pulse 69   Temp 98.2 °F (36.8 °C) (Oral)   Resp 20   Ht 4' 11\" (1.499 m)   Wt 111 lb (50.3 kg)   SpO2 92%   BMI 22.42 kg/m²    Constitutional and General Appearance: alert, cooperative, no distress and appears stated age  HEENT: PERRL, no cervical lymphadenopathy. No masses palpable. Normal oral mucosa  Respiratory:  Normal excursion and expansion without use of accessory muscles  Resp Auscultation: Good respiratory effort. No for increased work of breathing. On auscultation: clear to auscultation bilaterally  Cardiovascular:  Irregularly Irregular. Abdomen:   No masses or tenderness  Bowel sounds present  Extremities:   No Cyanosis or Clubbing   Lower extremity edema: No   Skin: Warm and dry  Neurological:  Alert and oriented. Moves all extremities well  No abnormalities of mood, affect, memory, mentation, or behavior are noted      Labs:     CBC:   Recent Labs     07/30/21  1046   WBC 9.5   HGB 10.7*   HCT 32.9*   *     BMP:   Recent Labs     07/30/21  1046      K 4.2   CO2 24   BUN 13   CREATININE 0.58   LABGLOM >60   GLUCOSE 105*     BNP: No results for input(s): BNP in the last 72 hours. PT/INR: No results for input(s): PROTIME, INR in the last 72 hours. APTT:No results for input(s): APTT in the last 72 hours. CARDIAC ENZYMES:No results for input(s): CKTOTAL, CKMB, CKMBINDEX, TROPONINI in the last 72 hours. FASTING LIPID PANEL:No results found for: HDL, LDLDIRECT, LDLCALC, TRIG  LIVER PROFILE:  Recent Labs     07/30/21  1046   AST 16   ALT 9   LABALBU 3.6     DATA:    Diagnostics:    EKG: Multiple PACs. ECHO:  07/30/2021  Summary  Normal left ventricular diameter. Moderate left ventricular hypertrophy. Hyperdynamic left ventricular function. Left ventricular ejection fraction  75 to 80 %. Left atrium is mildly dilated. Right atrium is mildly dilated .   Aortic valve sclerosis without stenosis. Increased velocity of LVOT and AV flow due to hyperdynamic state of the left  ventricle. Extensive mitral annular calcification. Thickened mitral valve leaflets. Mild mitral stenosis. Mean gradient is 5 mm Hg. Severe mitral regurgitation, eccentric, posteriorly directed, with coanda  effect. Mild to moderate tricuspid regurgitation. Estimated right ventricular systolic pressure is 40 mmHg. Mild pulmonary  hypertension. IVC Increased diameter, but still has inspiratory variation suggesting upper  normal or mildly elevated RA filling pressure (i.e. CVP) . IMPRESSION:    1. Status post fall with new onset A. fib as per EMR, severe MR which is new since echo in 2018. 2.  HTN/HLD. 3.  Multiple compression fractures of thoracic and lumbar vertebra status post kyphoplasty. RECOMMENDATIONS:  Continue rate control for A. fib with Lopressor, heparin and Plavix. We will add a low-dose of beta-blocker as tolerated. Will Schedule a DARRIN for Monday, based on severity of MR, will need to have ischemic work-up likely cardiac cath. Discussed with patient and Nurse.     Meryl Fields MD       Cardiovascular Fellow PGY-4  7/31/2021, 8:24 AM

## 2021-07-31 NOTE — H&P
[facility]  Office: [address], DO, Chana Savage, DO, Michelle Workman, DO, Anthony Oshea Blood, DO, Denae Welsh MD, Fatuma Shanks MD, Queen Toni MD, Fernando Eagle MD, Alessia Hooker MD, Keri Solorzano MD, Erasmo Martinez MD, Stephanie Aburto DO, Hiren Gudino MD, Rosibel Gold DO, Sergio Álvarez MD,  Filiberto Khoury DO, Gage Allen MD, Safia Landin MD, Briseida Quijano MD, Isidro Presley MD, Noel York MD, Radha Chou MD, Donya Calderon MD, Sunny Walker, [facility], [facility] Mary, CNP, Jackelinevinicio Reilly, CNP, Jim Johansen, CNS, Luis Eisenberg, CNP, Daisy Lilly, CNP, Mini Merino, CNP, Mildred Saunders, CNP, Francie Johnson, CNP, Jose Carson PA-C, Josefina Hoffmann, [facility], Eliel Shankar, CNP, Ameena Lopez, CNP, Elvira Purvis, [facility], Amauri Thomas, CNP, Grey Coy, CNP, Arnav Oliva, CNP, Adriana Fam, CNP, Luis Miguel Webber, [facility]   [address] [facility]    HISTORY AND PHYSICAL EXAMINATION            Date:   7/31/2021  Patient name:  Ward Villeda  Date of admission:  7/31/2021  1:08 AM  MRN:   [id number]  Account:  [de-identified]  YOB: 1940  PCP:    Rcihard Yang MD  Room:   2003/2003-01  Code Status:    Full Code    Chief Complaint:     Fall    History Obtained From:     patient, electronic medical record    History of Present Illness:     Ward Villeda is a 78 y.o. Non- / non  female who presents with No chief complaint on file. and is admitted to the hospital for the management of New onset atrial fibrillation ([facility] [address] This is an 78 yr old male with underlying history of HTN, HLD, depression, peripheral neuropathy, previous stroke, osteopetrosis with history of pathological fractures s/p kyphoplasty and CAD who presents initially to [place] ER after a fall earlier today. States she was hanging clothes on the clothes line with she 'lost her balance and fell back'.   She denies any associated dizziness/light headedness, CP, SOB, trauma/injury or hitting head. With history of chronic back pain, patient denies any change from chronic pain characteristics and has been ambulating without difficulty. Patient states she lives alone with family who lives close by and visits daily. Pertinent presenting labs include initial EKG: A. fib with competing junctional pacemaker with HR: 76, proBNP: 1498, troponin: 12, bilirubin: 1.78, glucose: 105, hemoglobin: 10.7, D-dimer: 1.37, UA: 2+ bacteria and trace leuk esterase, CT chest: No infiltrates or effusions, no central filling defects within the pulmonary arterial tree however distal PE cannot be excluded, lumbar spine XR: Mild superior endplate deformity of L1 and vertebral augmentation at T12, L2 and L4, thoracic spine XR: Age-indeterminate anterior wedging of T11 and 2D echo moderate LVH, hyperdynamic LV function with EF: 75 to 80%, aortic valve sclerosis without stenosis, severe MR, moderate TR with RVSP: 40.  Cardiology was consulted from outlying facility, patient anticoagulated with 1 mg/kg Lovenox and cardiology recommends transfer to our facility for further management. On my evaluation, patient is resting in bed and is hemodynamically stable. Remains in A. fib with HR: 70s. Patient is currently denying any symptoms and cardiac murmur heard best at the apex and axillary region. No evidence of clinical distress at this time.     Past Medical History:     Past Medical History:   Diagnosis Date    Chronic back pain     Neuropathy     Stroke ([address]     x2 in 2009 and x1 in 2010        Past Surgical History:     Past Surgical History:   Procedure Laterality Date    ABDOMEN SURGERY  1995    bowel resection for diverticulosis    APPENDECTOMY      FIXATION KYPHOPLASTY  7/3/14    T 12    HYSTERECTOMY      SPINE SURGERY N/A 5/10/2021    L2 ET L4 KYPHOPLASTY performed by Brayan Sow MD at [address] [place] [address]        Medications Prior to Admission:     Prior to Admission medications    Medication Sig Start Date End Date Taking? Authorizing Provider   alendronate (FOSAMAX) 70 MG tablet Take 70 mg by mouth once a week 8/20/20 8/20/21  Historical Provider, MD   diphenhydrAMINE-APAP, sleep, (TYLENOL PM EXTRA STRENGTH)  MG tablet Take 1 tablet by mouth nightly as needed for Sleep    Historical Provider, MD   gabapentin (NEURONTIN) 100 MG capsule Take 2 capsules by mouth 3 times daily. 3/5/21   Historical Provider, MD   sodium chloride () 5 % ophthalmic solution Apply 1 drop to eye 3 times daily    Historical Provider, MD   traMADol (ULTRAM) 50 MG tablet Take 1 tablet by mouth as needed. 4/24/21   Historical Provider, MD   acetaminophen (TYLENOL 8 HOUR ARTHRITIS PAIN) 650 MG extended release tablet Take 650 mg by mouth every 8 hours as needed for Pain    Historical Provider, MD   citalopram (CELEXA) 20 MG tablet Take 20 mg by mouth daily. Historical Provider, MD   clopidogrel (PLAVIX) 75 MG tablet Take 75 mg by mouth daily. Historical Provider, MD   b complex vitamins capsule Take 1 capsule by mouth daily. Historical Provider, MD   Pyridoxine HCl (VITAMIN B-6) 50 MG tablet Take 50 mg by mouth daily. Historical Provider, MD   vitamin D3 (CHOLECALCIFEROL) 1000 UNITS TABS tablet Take 1,000 Units by mouth 2 times daily. Historical Provider, MD   Valerian Root 100 MG CAPS Take  by mouth nightly. Historical Provider, MD   Alum Hydroxide-Mag Trisilicate (GAVISCON) 45-90.0 MG CHEW Take by mouth as needed     Historical Provider, MD        Allergies:     Asa [aspirin], Codeine, and Pcn [penicillins]    Social History:     Tobacco:    reports that she has never smoked. She has never used smokeless tobacco.  Alcohol:      reports no history of alcohol use. Drug Use:  reports no history of drug use. Family History:     No family history on file. Review of Systems:     Positive and Negative as described in HPI.     Review of Systems   Constitutional: Conjunctivae normal.      Pupils: Pupils are equal, round, and reactive to light. Cardiovascular:      Rate and Rhythm: Normal rate. Rhythm irregular. Pulses: Normal pulses. Heart sounds: Murmur heard. Systolic murmur is present. Pulmonary:      Effort: Pulmonary effort is normal. No respiratory distress. Breath sounds: Normal breath sounds. No wheezing, rhonchi or rales. Abdominal:      General: Abdomen is flat. There is no distension. Palpations: Abdomen is soft. There is no mass. Tenderness: There is no abdominal tenderness. Musculoskeletal:      Cervical back: Normal range of motion and neck supple. No rigidity or tenderness. Right lower leg: No edema. Left lower leg: No edema. Skin:     General: Skin is warm and dry. Capillary Refill: Capillary refill takes less than 2 seconds. Coloration: Skin is not jaundiced. Findings: No lesion. Neurological:      General: No focal deficit present. Mental Status: She is alert and oriented to person, place, and time. Mental status is at baseline. Cranial Nerves: No cranial nerve deficit. Sensory: No sensory deficit. Motor: No weakness. Psychiatric:         Mood and Affect: Mood normal.         Behavior: Behavior normal.         Thought Content:  Thought content normal.         Judgment: Judgment normal.         Investigations:      Laboratory Testing:  Recent Results (from the past 24 hour(s))   CBC Auto Differential    Collection Time: 07/30/21 10:46 AM   Result Value Ref Range    WBC 9.5 3.5 - 11.3 k/uL    RBC 3.30 (L) 3.95 - 5.11 m/uL    Hemoglobin 10.7 (L) 11.9 - 15.1 g/dL    Hematocrit 32.9 (L) 36.3 - 47.1 %    MCV 99.7 82.6 - 102.9 fL    MCH 32.4 25.2 - 33.5 pg    MCHC 32.5 25.2 - 33.5 g/dL    RDW 16.1 (H) 11.8 - 14.4 %    Platelets 031 (L) 142 - 453 k/uL    MPV 12.8 8.1 - 13.5 fL    NRBC Automated 0.0 0.0 per 100 WBC    Differential Type NOT REPORTED     Seg Neutrophils 81 (H) 36 - NEGATIVE    Urobilinogen, Urine Normal Normal    Nitrite, Urine NEGATIVE NEGATIVE    Leukocyte Esterase, Urine TRACE (A) NEGATIVE    Urinalysis Comments NOT REPORTED    Microscopic Urinalysis    Collection Time: 07/30/21 11:30 AM   Result Value Ref Range    -          WBC, UA 10 TO 25 0 - 4 /HPF    RBC, UA None 0 - 4 /HPF    Casts UA 0 TO 4 0 - 2 /LPF    Casts UA FINE GRANULAR 0 - 2 /LPF    Casts UA 0 TO 4 0 - 2 /LPF    Casts UA HYALINE 0 - 2 /LPF    Crystals, UA NOT REPORTED None /HPF    Epithelial Cells UA 5 TO 10 0 - 5 /HPF    Renal Epithelial, UA NOT REPORTED 0 /HPF    Bacteria, UA 2+ (A) None    Mucus, UA 4+ (A) None    Trichomonas, UA NOT REPORTED None    Amorphous, UA 2+ (A) None    Other Observations UA Specimen Cultured (A) NOT REQ. Yeast, UA NOT REPORTED None   Troponin    Collection Time: 07/30/21 11:40 AM   Result Value Ref Range    Troponin, High Sensitivity 12 0 - 14 ng/L    Troponin T NOT REPORTED <0.03 ng/mL    Troponin Interp NOT REPORTED    Brain Natriuretic Peptide    Collection Time: 07/30/21 11:40 AM   Result Value Ref Range    Pro-BNP 1,498 (H) <300 pg/mL    BNP Interpretation Pro-BNP Reference Range:    D-Dimer, Quantitative    Collection Time: 07/30/21 11:40 AM   Result Value Ref Range    D-Dimer, Quant 1.37 (H) 0.00 - 0.59 mg/L FEU       Imaging/Diagnostics:  XR CHEST (2 VW)    Result Date: 7/30/2021  No acute pulmonary findings. Mild cardiomegaly. Diffuse osseous demineralization with multilevel compression fractures as above which appear grossly similar to prior. XR THORACIC SPINE (2 VIEWS)    Result Date: 7/30/2021  Age indeterminate anterior wedging of T11. XR LUMBAR SPINE (2-3 VIEWS)    Result Date: 7/30/2021  1. Mild superior endplate deformity of L1 appears new in the interval. 2.  Vertebral augmentation at T12, L2 and L4.     CT CHEST PULMONARY EMBOLISM W CONTRAST    Result Date: 7/30/2021  No evidence for infiltrates or effusions.  No central filling defects are noted in the pulmonary arterial tree however there is excessive artifact which may be secondary to respiratory motion which limits evaluation of the pulmonary arteries beyond the tertiary branching point. Possible of distal pulmonary emboli cannot be excluded on this study. No evidence for right ventricular strain       Assessment :      Hospital Problems         Last Modified POA    * (Principal) New onset atrial fibrillation ([address] 7/31/2021 Yes    Osteoporosis with pathological fracture 7/31/2021 Yes    Nonrheumatic mitral valve regurgitation 7/31/2021 Yes    Essential hypertension 7/31/2021 Yes    Fall 7/31/2021 Yes    Normocytic normochromic anemia 7/31/2021 Yes    Depression 7/31/2021 Yes          Plan:     Patient status inpatient in the Progressive Unit/Step down    1. New onset AFib: cariology consulted and appreciate further recommendations, continue full dose lovenox and low dose bb, rate is currently controlled, continuous telemetry  2. Severe MR: cardiology consulted as above  3. Fall: cardiac factors listed above may be a factor, check orthostatics, hold home dose gabapentin and tramadol and avoid further sedating medications  4. Normocytic Normochromic Anemia: check iron studies and retic count and check thyroid function  5. HTN: currently controlled  6. Depression: continue home dose celexa  7. Pathologic Fractures: no acute findings on imaging, on alendronate  8. Consider VQ scan or repeat imaging to further assess for distal PE  9. Routine labs, home medications reviewed, diet as tolerated  10. DVT prophylaxis sufficient with full dose lovenox  11. Full Code    Consultations:   IP CONSULT TO CARDIOLOGY     Patient is admitted as inpatient status because of co-morbidities listed above, severity of signs and symptoms as outlined, requirement for current medical therapies and most importantly because of direct risk to patient if care not provided in a hospital setting.   Expected length of stay > 48 hours.    Nikia Ayala, GOYO - NP  7/31/2021  5:31 AM    Copy sent to Dr. Lamar Moreno MD

## 2021-07-31 NOTE — ED NOTES
Pt assisted to and from bathroom via wheelchair. Resp even and NL. Skin PWD. Pt c/o neuropathy pain \"real bad\" to left hip. Updated on plan of care. Pt denies questions or distress. Son at bedside.      Carla Mason RN  07/30/21 2034

## 2021-07-31 NOTE — PROGRESS NOTES
Physical Therapy    Facility/Department: [facility]  Initial Assessment    NAME: Denisa Robert  : 1940  MRN: [id number]    Date of Service: 2021    Discharge Recommendations:  Patient would benefit from continued therapy after discharge        Assessment   Body structures, Functions, Activity limitations: Decreased functional mobility ; Decreased balance;Decreased endurance;Decreased strength  Assessment: Presents with generalized weakness/decreased ndurance, needs CGA for mobility at this time. Will lissa assistnce from family/caregiver for mobility at discharge. Will recommend conintued physical therapy to improve fucntion while here as well as home care if DC home. Treatment Diagnosis: Impaired fucntion. Prognosis: Good  Decision Making: Medium Complexity  History: has a past medical history of Chronic back pain, Neuropathy, and Stroke ([facility] [address] Barriers to Learning: [place]  REQUIRES PT FOLLOW UP: Yes  Activity Tolerance  Activity Tolerance: Patient limited by fatigue;Patient limited by endurance       Patient Diagnosis(es): There were no encounter diagnoses. has a past medical history of Chronic back pain, Neuropathy, and Stroke ([facility] [address] has a past surgical history that includes Hysterectomy; Appendectomy; Abdomen surgery (); Fixation Kyphoplasty (7/3/14); and Spine surgery (N/A, 5/10/2021). Restrictions  Position Activity Restriction  Other position/activity restrictions: L2/L4 kyphoplasty 2021. Vision/Hearing  Vision: Impaired  Vision Exceptions: Wears glasses at all times (Needs new glassess)  Hearing: Exceptions to [facility] [address]  Hearing Exceptions: Hard of hearing/hearing concerns     Subjective  General  Patient assessed for rehabilitation services?: Yes  Additional Pertinent Hx: The patient is a 78 y.o. female PMH of HTN/HLD/CAD presenting from [place] ER status post fall. As per the patient she was hanging clothes on the clock time and she lost her balance and fell back.   No associated dizziness/lightheadedness/chest pain/palpitations/loss of consciousness. Initial EKG showing A. fib with RVR with bedside echo in the ER showing some significant MR. Transferred to [address] for further work-up. Per cardiology- 1. Will Schedule a DARRIN for Monday (8/2/21), based on severity of MR, will need to have ischemic work-up likely cardiac cath. Family / Caregiver Present: Yes (Son)  Referral Date : 07/30/21  Diagnosis: New onset of A-fib. Follows Commands: Within Functional Limits  Pain Screening  Patient Currently in Pain: Denies  Vital Signs  Patient Currently in Pain: Denies       Orientation  Orientation  Overall Orientation Status: Within Functional Limits  Social/Functional History  Social/Functional History  Lives With: Alone (Son checks on her daily)  Type of Home: House  Home Layout: Multi-level, Able to Live on Main level with bedroom/bathroom  Home Access: Stairs to enter with rails  Entrance Stairs - Number of Steps: 3  Entrance Stairs - Rails: Left  Bathroom Shower/Tub: Tub/Shower unit, Shower chair without back, Curtain  Bathroom Toilet: Standard  Bathroom Equipment: Grab bars in shower, Hand-held shower  Bathroom Accessibility: Accessible  Home Equipment: Cane, Rolling walker  Receives Help From: Family  ADL Assistance: Independent  Ambulation Assistance: Independent  Active : No  Patient's  Info: Son  Mode of Transportation: Car  Additional Comments: Family members check on her , asssit her with home chores, pt bj to perform light meals, family also bring in food for her, pt able to perform light home making chores, mostly done by family.   Cognition        Objective          AROM RLE (degrees)  RLE AROM: WFL  AROM LLE (degrees)  LLE AROM : WFL        Sensation  Overall Sensation Status: Impaired  Additional Comments: Neuropathy L LE  Bed mobility  Rolling to Left: Contact guard assistance  Rolling to Right: Contact guard assistance  Supine to Sit: Contact guard assistance  Sit to Supine: Minimal assistance  Scooting: Contact guard assistance  Comment: No dizzyness reports, slight neuropathic pain L  LE-chronic per pt. Transfers  Sit to Stand: Contact guard assistance  Stand to sit: Contact guard assistance  Ambulation  Ambulation?: Yes  Ambulation 1  Surface: level tile  Device: Rolling Walker  Assistance: Contact guard assistance  Quality of Gait: wide JANINA, slight unsteady, fatiques easily, not safe to ambualte without rolling walker at this time. Distance: 20 ft  Comments: Assisted back to bed, due to fatique, pt has gotten up couple times this morning to use BSC.      Balance  Posture: Fair  Sitting - Static: Good  Sitting - Dynamic: Fair  Standing - Static: Fair;+ (RW)  Standing - Dynamic: Fair (RW)        Plan   Plan  Times per week: 5 to6x/week  Current Treatment Recommendations: Strengthening, Balance Training, Functional Mobility Training, Transfer Training, Endurance Training, Gait Training, Home Exercise Program, Safety Education & Training, Patient/Caregiver Education & Training  Safety Devices  Type of devices: Gait belt, Bed alarm in place, Left in bed, Nurse notified    G-Code       OutComes Score                                                  AM-PAC Score  AM-PAC Inpatient Mobility Raw Score : 16 (07/31/21 1017)  -PAC Inpatient T-Scale Score : 40.78 (07/31/21 1017)  Mobility Inpatient CMS 0-100% Score: 54.16 (07/31/21 1017)  Mobility Inpatient CMS G-Code Modifier : CK (07/31/21 1017)          Goals  Short term goals  Time Frame for Short term goals: 7 to 10 visits  Short term goal 1:  Pt able to perform supine<>sit SBA  Short term goal 2: Pt able to perform sit to stnd and transfers SBA  Short term goal 3: Pt able to ambulate with RW distance fo 100 ft, SBA  Short term goal 4: Pt to improve standing dynamic baalnce with assistive device to Good- to reduce fall risk  Short term goal 5:  Pt to tolrate activity for 20 to 30 minutes to imrpove function/mobility  Patient Goals   Patient goals : Son's goal-rositatian mobility/strength       Therapy Time   Individual Concurrent Group Co-treatment   Time In 1006         Time Out 1045         Minutes 39         Timed Code Treatment Minutes: 25 Minutes       Nannette Miranda PT

## 2021-08-01 LAB
ABSOLUTE EOS #: 0.2 K/UL (ref 0–0.44)
ABSOLUTE IMMATURE GRANULOCYTE: 0.08 K/UL (ref 0–0.3)
ABSOLUTE LYMPH #: 1.34 K/UL (ref 1.1–3.7)
ABSOLUTE MONO #: 0.69 K/UL (ref 0.1–1.2)
ANION GAP SERPL CALCULATED.3IONS-SCNC: 11 MMOL/L (ref 9–17)
BASOPHILS # BLD: 2 % (ref 0–2)
BASOPHILS ABSOLUTE: 0.14 K/UL (ref 0–0.2)
BUN BLDV-MCNC: 10 MG/DL (ref 8–23)
BUN/CREAT BLD: ABNORMAL (ref 9–20)
CALCIUM SERPL-MCNC: 8.6 MG/DL (ref 8.6–10.4)
CHLORIDE BLD-SCNC: 103 MMOL/L (ref 98–107)
CO2: 26 MMOL/L (ref 20–31)
CREAT SERPL-MCNC: 0.45 MG/DL (ref 0.5–0.9)
DIFFERENTIAL TYPE: ABNORMAL
EOSINOPHILS RELATIVE PERCENT: 3 % (ref 1–4)
GFR AFRICAN AMERICAN: >60 ML/MIN
GFR NON-AFRICAN AMERICAN: >60 ML/MIN
GFR SERPL CREATININE-BSD FRML MDRD: ABNORMAL ML/MIN/{1.73_M2}
GFR SERPL CREATININE-BSD FRML MDRD: ABNORMAL ML/MIN/{1.73_M2}
GLUCOSE BLD-MCNC: 94 MG/DL (ref 70–99)
HCT VFR BLD CALC: 33.2 % (ref 36.3–47.1)
HEMOGLOBIN: 10.3 G/DL (ref 11.9–15.1)
IMMATURE GRANULOCYTES: 1 %
LYMPHOCYTES # BLD: 19 % (ref 24–43)
MCH RBC QN AUTO: 30.9 PG (ref 25.2–33.5)
MCHC RBC AUTO-ENTMCNC: 31 G/DL (ref 28.4–34.8)
MCV RBC AUTO: 99.7 FL (ref 82.6–102.9)
MONOCYTES # BLD: 10 % (ref 3–12)
NRBC AUTOMATED: 0 PER 100 WBC
PDW BLD-RTO: 16.2 % (ref 11.8–14.4)
PLATELET # BLD: ABNORMAL K/UL (ref 138–453)
PLATELET ESTIMATE: ABNORMAL
PLATELET, FLUORESCENCE: 145 K/UL (ref 138–453)
PLATELET, IMMATURE FRACTION: 14.3 % (ref 1.1–10.3)
PMV BLD AUTO: ABNORMAL FL (ref 8.1–13.5)
POTASSIUM SERPL-SCNC: 4.1 MMOL/L (ref 3.7–5.3)
RBC # BLD: 3.33 M/UL (ref 3.95–5.11)
RBC # BLD: ABNORMAL 10*6/UL
SEG NEUTROPHILS: 65 % (ref 36–65)
SEGMENTED NEUTROPHILS ABSOLUTE COUNT: 4.59 K/UL (ref 1.5–8.1)
SODIUM BLD-SCNC: 140 MMOL/L (ref 135–144)
WBC # BLD: 7 K/UL (ref 3.5–11.3)
WBC # BLD: ABNORMAL 10*3/UL

## 2021-08-01 PROCEDURE — 2060000000 HC ICU INTERMEDIATE R&B

## 2021-08-01 PROCEDURE — 6360000002 HC RX W HCPCS: Performed by: NURSE PRACTITIONER

## 2021-08-01 PROCEDURE — 36415 COLL VENOUS BLD VENIPUNCTURE: CPT

## 2021-08-01 PROCEDURE — 6370000000 HC RX 637 (ALT 250 FOR IP): Performed by: NURSE PRACTITIONER

## 2021-08-01 PROCEDURE — 6370000000 HC RX 637 (ALT 250 FOR IP): Performed by: STUDENT IN AN ORGANIZED HEALTH CARE EDUCATION/TRAINING PROGRAM

## 2021-08-01 PROCEDURE — 80048 BASIC METABOLIC PNL TOTAL CA: CPT

## 2021-08-01 PROCEDURE — 99232 SBSQ HOSP IP/OBS MODERATE 35: CPT | Performed by: FAMILY MEDICINE

## 2021-08-01 PROCEDURE — 85055 RETICULATED PLATELET ASSAY: CPT

## 2021-08-01 PROCEDURE — 2580000003 HC RX 258: Performed by: NURSE PRACTITIONER

## 2021-08-01 PROCEDURE — 85025 COMPLETE CBC W/AUTO DIFF WBC: CPT

## 2021-08-01 RX ADMIN — CLOPIDOGREL 75 MG: 75 TABLET, FILM COATED ORAL at 10:11

## 2021-08-01 RX ADMIN — METOPROLOL TARTRATE 12.5 MG: 25 TABLET ORAL at 10:11

## 2021-08-01 RX ADMIN — SODIUM CHLORIDE, PRESERVATIVE FREE 10 ML: 5 INJECTION INTRAVENOUS at 10:13

## 2021-08-01 RX ADMIN — FUROSEMIDE 20 MG: 20 TABLET ORAL at 10:11

## 2021-08-01 RX ADMIN — METOPROLOL TARTRATE 12.5 MG: 25 TABLET ORAL at 20:56

## 2021-08-01 RX ADMIN — SODIUM CHLORIDE, PRESERVATIVE FREE 10 ML: 5 INJECTION INTRAVENOUS at 20:57

## 2021-08-01 RX ADMIN — CITALOPRAM 20 MG: 20 TABLET, FILM COATED ORAL at 10:11

## 2021-08-01 RX ADMIN — ENOXAPARIN SODIUM 50 MG: 60 INJECTION SUBCUTANEOUS at 10:12

## 2021-08-01 RX ADMIN — ENOXAPARIN SODIUM 50 MG: 60 INJECTION SUBCUTANEOUS at 20:57

## 2021-08-01 ASSESSMENT — PAIN SCALES - GENERAL: PAINLEVEL_OUTOF10: 0

## 2021-08-01 NOTE — PROGRESS NOTES
[facility]  Progress Note                   Date:   8/1/2021  Patient name: Amanda Rizo  Date of admission:  7/31/2021  1:08 AM  MRN:   [id number]  YOB: 1940  PCP: Adrien Back MD    Reason for Admission: New onset atrial fibrillation ([facility] [address] [I48.91]    Subjective:       Clinical Changes /Abnormalities:  Patient seen and examined in room after discussion with RN. Denies chest pain and SOB. A FIB on tele Rate controlled. Review of Systems    Medications:   Scheduled Meds:   clopidogrel  75 mg Oral Daily    citalopram  20 mg Oral Daily    sodium chloride flush  5-40 mL Intravenous 2 times per day    enoxaparin  1 mg/kg Subcutaneous BID    metoprolol tartrate  12.5 mg Oral BID    furosemide  20 mg Oral Daily     Continuous Infusions:   sodium chloride       CBC:   Recent Labs     07/30/21  1046 08/01/21  0656   WBC 9.5 7.0   HGB 10.7* 10.3*   * See Reflexed IPF Result     BMP:    Recent Labs     07/30/21  1046 07/31/21  1013 08/01/21  0656    141 140   K 4.2 4.1 4.1    107 103   CO2 24 20 26   BUN 13 10 10   CREATININE 0.58 0.40* 0.45*   GLUCOSE 105* 97 94     Hepatic:  Recent Labs     07/30/21  1046 07/31/21  1013   AST 16 16   ALT 9 8   BILITOT 1.78* 1.27*   ALKPHOS 69 62     Troponin:   Recent Labs     07/30/21  1140 07/31/21  1013   TROPHS 12 12     BNP: No results for input(s): BNP in the last 72 hours. Lipids: No results for input(s): CHOL, HDL in the last 72 hours. Invalid input(s): LDLCALCU  INR:   Recent Labs     07/31/21  1013   INR 1.0     DATA:    Diagnostics:    EKG: Multiple PACs. ECHO:  07/30/2021  Summary  Normal left ventricular diameter. Moderate left ventricular hypertrophy. Hyperdynamic left ventricular function. Left ventricular ejection fraction  75 to 80 %. Left atrium is mildly dilated. Right atrium is mildly dilated . Aortic valve sclerosis without stenosis.   Increased velocity of LVOT and AV flow due to hyperdynamic state of the left  ventricle. Extensive mitral annular calcification. Thickened mitral valve leaflets. Mild mitral stenosis. Mean gradient is 5 mm Hg. Severe mitral regurgitation, eccentric, posteriorly directed, with coanda  effect. Mild to moderate tricuspid regurgitation. Estimated right ventricular systolic pressure is 40 mmHg. Mild pulmonary  hypertension. IVC Increased diameter, but still has inspiratory variation suggesting upper  normal or mildly elevated RA filling pressure (i.e. CVP) . Objective:   Vitals: BP (!) 118/58   Pulse 69   Temp 98.1 °F (36.7 °C) (Oral)   Resp 21   Ht 4' 11\" (1.499 m)   Wt 111 lb (50.3 kg)   SpO2 98%   BMI 22.42 kg/m²   General appearance: alert and cooperative with exam  HEENT: Head: Normocephalic, no lesions, without obvious abnormality. Neck:no JVD, trachea midline, no adenopathy  Lungs: Clear to auscultation  Heart: Irregular rate and rhythm, s1/s2 auscultated, + murmurs  A fib rate controlled. Abdomen: soft, non-tender, bowel sounds active  Extremities: no edema  Neurologic: not done        Assessment / Acute Cardiac Problems:   1. New onset A Fib  2. New onset severe Mitral regurgitation ECHO reviewed by Dr Irais Rinaldi  3. HTN    Patient Active Problem List:     Compression fracture of T12 vertebra ([facility] [address]     Osteoporosis with pathological fracture     New onset atrial fibrillation (H[facility])     Nonrheumatic mitral valve regurgitation     Essential hypertension     Fall     Normocytic normochromic anemia     Depression      Plan of Treatment:   1. Plan for DARRIN on Monday to assess severity of MR on ECHO per Dr Irais Rinaldi If severity not due to HOCM/LIDYA proceed with R and L heart cath. If MR is < severe treat medically  2. A Fib Rate controlled BB and SC Lovenox  3. HTN Controlled  Continue BB  4. Keep K > 4.0 and Mag > 2.0 stable  5.  Rest of care per Primary Team.     Electronically signed by GOYO Suh NP on 8/1/2021 at 9:28 [address] [facility] [address] [facility].  [phone]

## 2021-08-01 NOTE — PLAN OF CARE
Problem:  Activity:  Goal: Ability to tolerate increased activity will improve  Description: Ability to tolerate increased activity will improve  Outcome: Ongoing  Goal: Expression of feelings of increased energy will increase  Description: Expression of feelings of increased energy will increase  Outcome: Ongoing     Problem: Cardiac:  Goal: Ability to maintain an adequate cardiac output will improve  Description: Ability to maintain an adequate cardiac output will improve  Outcome: Ongoing  Goal: Complications related to the disease process, condition or treatment will be avoided or minimized  Description: Complications related to the disease process, condition or treatment will be avoided or minimized  Outcome: Ongoing     Problem: Coping:  Goal: Level of anxiety will decrease  Description: Level of anxiety will decrease  Outcome: Ongoing  Goal: General experience of comfort will improve  Description: General experience of comfort will improve  Outcome: Ongoing     Problem: Health Behavior:  Goal: Ability to manage health-related needs will improve  Description: Ability to manage health-related needs will improve  Outcome: Ongoing     Problem: Safety:  Goal: Ability to remain free from injury will improve  Description: Ability to remain free from injury will improve  Outcome: Ongoing  Goal: Will show no signs and symptoms of excessive bleeding  Description: Will show no signs and symptoms of excessive bleeding  Outcome: Ongoing     Problem: Falls - Risk of:  Goal: Will remain free from falls  Description: Will remain free from falls  7/31/2021 2309 by Mil Cam RN  Outcome: Ongoing  7/31/2021 1159 by Nkechi Parker RN  Outcome: Ongoing  Goal: Absence of physical injury  Description: Absence of physical injury  7/31/2021 2309 by Mil Cam RN  Outcome: Ongoing  7/31/2021 1159 by Nkechi Parker RN  Outcome: Ongoing     Problem: Skin Integrity:  Goal: Will show no infection signs and

## 2021-08-01 NOTE — PROGRESS NOTES
[facility]  Office: [address], DO, Stanislav Dumont, DO, Pili Castellanos, DO, Yvonne Petty Blood, DO, Nikki Arrieta MD, Jatinder Saldaña MD, Codie Tracey MD, Lorena Beard MD, Suha Hall MD, Salinas Davis MD, Yesica Perry MD, Tammi Allen, DO, Luis Mcclain MD, Sandra Oliveira, DO, Jose Russell MD,  Domitila Paz DO, Aly Castle MD, April Blackwell MD, Sandeep Bonner MD, Delmi Cleary MD, Jiles Alpers, MD, Mere Vela MD, Beatriz Hayes MD, Pavan Rinaldi, [facility], [facility], [facility], Nicanor West, CNP, Sugey Mike, CNS, Rebel Garcia, CNP, Mj Sarkar, CNP, Telma Casey, CNP, Jonathan Romano, CNP, Jr Castle, CNP, [place] PA-C, Martita Young, [facility], Kathleen Alejandra, CNP, Silvia Guerrier, CNP, Tom Cross, CNP, Bobby Gomez, CNP, Broderick Villalba, CNP, Rush Moura, CNP, Karlos Camacho, CNP, Una Cui, [address] [place] [address]    Progress Note    8/1/2021    12:13 PM    Name:   Dilshad King  MRN:     [id number]     [place]:      [de-identified]   Room:   2003/2003-01   Day:  1  Admit Date:  7/31/2021  1:08 AM    PCP:   Gian Mckeon MD  Code Status:  Full Code    Subjective:     C/C: Fall    Interval History Status: not changed. Patient seen and examined at bedside. Lying in bed in no acute distress. Continues to be in A. fib however rate controlled  Denies any acute events overnight. Awaiting cardiac testing tomorrow    Brief History:   Per H&P:  Alexandra Howard is a 78 y.o. Non- / non  female who presents with No chief complaint on file. and is admitted to the hospital for the management of New onset atrial fibrillation ([facility] [address]    This is an 78 yr old male with underlying history of HTN, HLD, depression, peripheral neuropathy, previous stroke, osteopetrosis with history of pathological fractures s/p kyphoplasty and CAD who presents initially to [place] [facility] after a fall earlier today. States she was hanging clothes on the clothes line with she 'lost her balance and fell back'. She denies any associated dizziness/light headedness, CP, SOB, trauma/injury or hitting head. With history of chronic back pain, patient denies any change from chronic pain characteristics and has been ambulating without difficulty. Patient states she lives alone with family who lives close by and visits daily. Pertinent presenting labs include initial EKG: A. fib with competing junctional pacemaker with HR: 76, proBNP: 1498, troponin: 12, bilirubin: 1.78, glucose: 105, hemoglobin: 10.7, D-dimer: 1.37, UA: 2+ bacteria and trace leuk esterase, CT chest: No infiltrates or effusions, no central filling defects within the pulmonary arterial tree however distal PE cannot be excluded, lumbar spine XR: Mild superior endplate deformity of L1 and vertebral augmentation at T12, L2 and L4, thoracic spine XR: Age-indeterminate anterior wedging of T11 and 2D echo moderate LVH, hyperdynamic LV function with EF: 75 to 80%, aortic valve sclerosis without stenosis, severe MR, moderate TR with RVSP: 40.  Cardiology was consulted from outlying facility, patient anticoagulated with 1 mg/kg Lovenox and cardiology recommends transfer to our facility for further management.       On my evaluation, patient is resting in bed and is hemodynamically stable. Remains in A. fib with HR: 70s. Patient is currently denying any symptoms and cardiac murmur heard best at the apex and axillary region. No evidence of clinical distress at this time. \"    Review of Systems:     Constitutional:  negative for chills, fevers, sweats  Respiratory:  negative for cough, dyspnea on exertion, shortness of breath, wheezing  Cardiovascular:  negative for chest pain, chest pressure/discomfort, lower extremity edema, palpitations  Gastrointestinal:  negative for abdominal pain, constipation, diarrhea, nausea, vomiting  Neurological:  negative for dizziness, headache    Medications: Allergies: Allergies   Allergen Reactions    Asa [Aspirin]     Codeine     Pcn [Penicillins]        Current Meds:   Scheduled Meds:    clopidogrel  75 mg Oral Daily    citalopram  20 mg Oral Daily    sodium chloride flush  5-40 mL Intravenous 2 times per day    enoxaparin  1 mg/kg Subcutaneous BID    metoprolol tartrate  12.5 mg Oral BID    furosemide  20 mg Oral Daily     Continuous Infusions:    sodium chloride       PRN Meds: sodium chloride flush, sodium chloride, ondansetron **OR** ondansetron, polyethylene glycol, acetaminophen **OR** acetaminophen    Data:     Past Medical History:   has a past medical history of Chronic back pain, Neuropathy, and Stroke ([address] Social History:   reports that she has never smoked. She has never used smokeless tobacco. She reports that she does not drink alcohol and does not use drugs. Family History: No family history on file. Vitals:  /61   Pulse 69   Temp 98.2 °F (36.8 °C) (Oral)   Resp 16   Ht 4' 11\" (1.499 m)   Wt 111 lb (50.3 kg)   SpO2 98%   BMI 22.42 kg/m²   Temp (24hrs), Av.3 °F (36.8 °C), Min:98 °F (36.7 °C), Max:98.6 °F (37 °C)    No results for input(s): POCGLU in the last 72 hours. I/O (24Hr):     Intake/Output Summary (Last 24 hours) at 2021 1213  Last data filed at 2021 0641  Gross per 24 hour   Intake --   Output 800 ml   Net -800 ml       Labs:  Hematology:  Recent Labs     21  1046 21  1140 21  1013 21  0656   WBC 9.5  --   --  7.0   RBC 3.30*  --   --  3.33*   HGB 10.7*  --   --  10.3*   HCT 32.9*  --   --  33.2*   MCV 99.7  --   --  99.7   MCH 32.4  --   --  30.9   MCHC 32.5  --   --  31.0   RDW 16.1*  --   --  16.2*   *  --   --  See Reflexed IPF Result   MPV 12.8  --   --  NOT REPORTED   INR  --   --  1.0  --    DDIMER  --  1.37*  --   --      Chemistry:  Recent Labs     21  1046 21  1140 21  1013 21  0656     --  141 140   K 4.2  --  4.1 4.1     --  107 103   CO2 24  --  20 26   GLUCOSE 105*  --  97 94   BUN 13  --  10 10   CREATININE 0.58  --  0.40* 0.45*   MG  --   --  2.0  --    ANIONGAP 11  --  14 11   LABGLOM >60  --  >60 >60   GFRAA >60  --  >60 >60   CALCIUM 8.8  --  8.6 8.6   PROBNP  --  1,498* 1,845*  --    TROPHS  --  12 12  --      Recent Labs     07/30/21  1046 07/31/21  1013   PROT 6.2* 5.8*   LABALBU 3.6 3.4*   TSH  --  1.88   AST 16 16   ALT 9 8   ALKPHOS 69 62   BILITOT 1.78* 1.27*     ABG:No results found for: POCPH, PHART, PH, POCPCO2, MQZ3XGA, PCO2, POCPO2, PO2ART, PO2, POCHCO3, PXA2ARL, HCO3, NBEA, PBEA, BEART, BE, THGBART, THB, POU2CNF, TQUO2USZ, F1EUJYWA, O2SAT, FIO2  Lab Results   Component Value Date/Time    SPECIAL NOT REPORTED 07/30/2021 10:45 AM     Lab Results   Component Value Date/Time    CULTURE NO SIGNIFICANT GROWTH 07/30/2021 10:45 AM       Radiology:  XR CHEST (2 VW)    Result Date: 7/30/2021  No acute pulmonary findings. Mild cardiomegaly. Diffuse osseous demineralization with multilevel compression fractures as above which appear grossly similar to prior. XR THORACIC SPINE (2 VIEWS)    Result Date: 7/30/2021  Age indeterminate anterior wedging of T11. XR LUMBAR SPINE (2-3 VIEWS)    Result Date: 7/30/2021  1. Mild superior endplate deformity of L1 appears new in the interval. 2.  Vertebral augmentation at T12, L2 and L4.     CT CHEST PULMONARY EMBOLISM W CONTRAST    Result Date: 7/30/2021  No evidence for infiltrates or effusions. No central filling defects are noted in the pulmonary arterial tree however there is excessive artifact which may be secondary to respiratory motion which limits evaluation of the pulmonary arteries beyond the tertiary branching point. Possible of distal pulmonary emboli cannot be excluded on this study.  No evidence for right ventricular strain       Physical Examination:        General appearance:  alert, cooperative and no distress  Mental Status:

## 2021-08-02 ENCOUNTER — APPOINTMENT (OUTPATIENT)
Dept: CARDIAC CATH/INVASIVE PROCEDURES | Age: 81
DRG: 310 | End: 2021-08-02
Attending: INTERNAL MEDICINE
Payer: MEDICARE

## 2021-08-02 LAB
ABSOLUTE EOS #: 0.2 K/UL (ref 0–0.44)
ABSOLUTE IMMATURE GRANULOCYTE: 0.05 K/UL (ref 0–0.3)
ABSOLUTE LYMPH #: 1.51 K/UL (ref 1.1–3.7)
ABSOLUTE MONO #: 0.72 K/UL (ref 0.1–1.2)
ANION GAP SERPL CALCULATED.3IONS-SCNC: 11 MMOL/L (ref 9–17)
BASOPHILS # BLD: 2 % (ref 0–2)
BASOPHILS ABSOLUTE: 0.1 K/UL (ref 0–0.2)
BUN BLDV-MCNC: 16 MG/DL (ref 8–23)
BUN/CREAT BLD: ABNORMAL (ref 9–20)
CALCIUM SERPL-MCNC: 8.4 MG/DL (ref 8.6–10.4)
CHLORIDE BLD-SCNC: 102 MMOL/L (ref 98–107)
CO2: 25 MMOL/L (ref 20–31)
CREAT SERPL-MCNC: 0.6 MG/DL (ref 0.5–0.9)
DIFFERENTIAL TYPE: ABNORMAL
EKG ATRIAL RATE: 61 BPM
EKG Q-T INTERVAL: 410 MS
EKG QRS DURATION: 76 MS
EKG QTC CALCULATION (BAZETT): 461 MS
EKG T AXIS: 57 DEGREES
EKG VENTRICULAR RATE: 76 BPM
EOSINOPHILS RELATIVE PERCENT: 3 % (ref 1–4)
GFR AFRICAN AMERICAN: >60 ML/MIN
GFR NON-AFRICAN AMERICAN: >60 ML/MIN
GFR SERPL CREATININE-BSD FRML MDRD: ABNORMAL ML/MIN/{1.73_M2}
GFR SERPL CREATININE-BSD FRML MDRD: ABNORMAL ML/MIN/{1.73_M2}
GLUCOSE BLD-MCNC: 92 MG/DL (ref 70–99)
HCT VFR BLD CALC: 34.3 % (ref 36.3–47.1)
HEMOGLOBIN: 10.8 G/DL (ref 11.9–15.1)
IMMATURE GRANULOCYTES: 1 %
LV EF: 68 %
LVEF MODALITY: NORMAL
LYMPHOCYTES # BLD: 23 % (ref 24–43)
MCH RBC QN AUTO: 31.3 PG (ref 25.2–33.5)
MCHC RBC AUTO-ENTMCNC: 31.5 G/DL (ref 28.4–34.8)
MCV RBC AUTO: 99.4 FL (ref 82.6–102.9)
MONOCYTES # BLD: 11 % (ref 3–12)
NRBC AUTOMATED: 0 PER 100 WBC
PDW BLD-RTO: 16.2 % (ref 11.8–14.4)
PLATELET # BLD: ABNORMAL K/UL (ref 138–453)
PLATELET ESTIMATE: ABNORMAL
PLATELET, FLUORESCENCE: 154 K/UL (ref 138–453)
PLATELET, IMMATURE FRACTION: 14.3 % (ref 1.1–10.3)
PMV BLD AUTO: ABNORMAL FL (ref 8.1–13.5)
POTASSIUM SERPL-SCNC: 3.9 MMOL/L (ref 3.7–5.3)
RBC # BLD: 3.45 M/UL (ref 3.95–5.11)
RBC # BLD: ABNORMAL 10*6/UL
SARS-COV-2, RAPID: NOT DETECTED
SEG NEUTROPHILS: 61 % (ref 36–65)
SEGMENTED NEUTROPHILS ABSOLUTE COUNT: 4.02 K/UL (ref 1.5–8.1)
SODIUM BLD-SCNC: 138 MMOL/L (ref 135–144)
SPECIMEN DESCRIPTION: NORMAL
WBC # BLD: 6.6 K/UL (ref 3.5–11.3)
WBC # BLD: ABNORMAL 10*3/UL

## 2021-08-02 PROCEDURE — 93312 ECHO TRANSESOPHAGEAL: CPT

## 2021-08-02 PROCEDURE — 36415 COLL VENOUS BLD VENIPUNCTURE: CPT

## 2021-08-02 PROCEDURE — 6360000002 HC RX W HCPCS

## 2021-08-02 PROCEDURE — B24BZZ4 ULTRASONOGRAPHY OF HEART WITH AORTA, TRANSESOPHAGEAL: ICD-10-PCS | Performed by: INTERNAL MEDICINE

## 2021-08-02 PROCEDURE — 97166 OT EVAL MOD COMPLEX 45 MIN: CPT

## 2021-08-02 PROCEDURE — 97535 SELF CARE MNGMENT TRAINING: CPT

## 2021-08-02 PROCEDURE — 97116 GAIT TRAINING THERAPY: CPT

## 2021-08-02 PROCEDURE — 6370000000 HC RX 637 (ALT 250 FOR IP): Performed by: NURSE PRACTITIONER

## 2021-08-02 PROCEDURE — 80048 BASIC METABOLIC PNL TOTAL CA: CPT

## 2021-08-02 PROCEDURE — 97530 THERAPEUTIC ACTIVITIES: CPT

## 2021-08-02 PROCEDURE — 6370000000 HC RX 637 (ALT 250 FOR IP): Performed by: STUDENT IN AN ORGANIZED HEALTH CARE EDUCATION/TRAINING PROGRAM

## 2021-08-02 PROCEDURE — 2580000003 HC RX 258: Performed by: NURSE PRACTITIONER

## 2021-08-02 PROCEDURE — 93320 DOPPLER ECHO COMPLETE: CPT

## 2021-08-02 PROCEDURE — 6360000002 HC RX W HCPCS: Performed by: NURSE PRACTITIONER

## 2021-08-02 PROCEDURE — 85055 RETICULATED PLATELET ASSAY: CPT

## 2021-08-02 PROCEDURE — 85025 COMPLETE CBC W/AUTO DIFF WBC: CPT

## 2021-08-02 PROCEDURE — 87635 SARS-COV-2 COVID-19 AMP PRB: CPT

## 2021-08-02 PROCEDURE — 2060000000 HC ICU INTERMEDIATE R&B

## 2021-08-02 PROCEDURE — 93325 DOPPLER ECHO COLOR FLOW MAPG: CPT

## 2021-08-02 PROCEDURE — 99232 SBSQ HOSP IP/OBS MODERATE 35: CPT | Performed by: FAMILY MEDICINE

## 2021-08-02 RX ADMIN — FUROSEMIDE 20 MG: 20 TABLET ORAL at 09:03

## 2021-08-02 RX ADMIN — ENOXAPARIN SODIUM 50 MG: 60 INJECTION SUBCUTANEOUS at 22:19

## 2021-08-02 RX ADMIN — CITALOPRAM 20 MG: 20 TABLET, FILM COATED ORAL at 09:03

## 2021-08-02 RX ADMIN — METOPROLOL TARTRATE 25 MG: 25 TABLET ORAL at 20:27

## 2021-08-02 RX ADMIN — METOPROLOL TARTRATE 12.5 MG: 25 TABLET ORAL at 09:03

## 2021-08-02 RX ADMIN — CLOPIDOGREL 75 MG: 75 TABLET, FILM COATED ORAL at 09:03

## 2021-08-02 RX ADMIN — SODIUM CHLORIDE, PRESERVATIVE FREE 10 ML: 5 INJECTION INTRAVENOUS at 20:45

## 2021-08-02 RX ADMIN — SODIUM CHLORIDE, PRESERVATIVE FREE 10 ML: 5 INJECTION INTRAVENOUS at 09:03

## 2021-08-02 NOTE — PROCEDURES
[facility]  Transesophageal Echocardiogram       Today's Date:  8/2/2021  Indication:   MR    Operators:  Primary:   Dr Nava Morris (Attending Physician)  Assistant:   Rc Vaughan MD (Cardiovascular Fellow)      Pre Procedure Conscious Sedation Data:    ASA Class:    [] I [x] II [] III [] IV    Mallampati Class:  [] I [x] II [] III [] IV    Procedure:    Patient seen and examined. History and Physical reviewed. Labs reviewed. After informed consent was obtained with explanation of the risks and benefits, the patient was brought to cardiac cath lab. All sedation was administered by the cardiologist. The oropharynx was pre-anesthesized with viscous lidocaine and cetacaine spray. The ultrasound probe was passed without any difficulty. DARRIN findings:    LA:  Severely enlarged   LENO:  No thrombus  RA:  Normal  RV:   Normal  LV:    Hyperdynamic left ventricular function. Left ventricular ejection fraction  75 to 80 %. Aorta:   Mild atheromatous disease arch  Pericardium: No pericardial effusion  Septum:  No intracardiac shunt via color Doppler. Valves:    Mitral Valve: Structurally normal. posteriorly directed jet of MR. Moderate to severe regurgitation  Aortic Valve: Increased velocity of LVOT and AV flow due to hyperdynamic state of the left  ventricle. Tricuspid valve: Structurally normal. No regurgitation is identified. Pulmonary valve: Normal. No significant regurgitation    No valvular vegetations or thrombus identified. Summary:     1. A DARRIN was performed without complications. 2. LVEF 75-80 %  3. No thrombus or valvular vegetation identified  4. Mitral Valve: Structurally normal. 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 Moderate to severe regurgitation  5. Aortic Valve: Increased velocity of LVOT and AV flow due to hyperdynamic state of the left  ventricle. 6. There were no complications encountered.       Electronically signed by Rc Vaughan MD on 8/2/2021 at 2:12 PM  Cardiovascular Diseases Fellow  [facility]        I have reviewed the case / procedure with resident / fellow  I have examined the patient personally  Patient agree with treatment plan as discussed before, final arrangement based on my evaluation and exam.    Risk and benefit of procedure planned were explained in details. Procedure was performed by me personally, with all aspect of the procedure being done using standard protocol. Note was modified based on my own assessment and treatment.     MD Piotr Escoto [facility]

## 2021-08-02 NOTE — PROGRESS NOTES
Occupational Therapy   Occupational Therapy Initial Assessment  Date: 2021   Patient Name: Jessica Fitch  MRN: [id number]     : 1940     Copied from chart: 78-year-old female with history of hypertension, hyperlipidemia,, neuropathy, CAD who presented as a transfer from [facility] [place] [facility] [place] [facility]. Reports that she was hanging clothes on the clothing line when she lost her balance. Denies any LOC. Date of Service: 2021    Discharge Recommendations:  Patient would benefit from continued therapy after discharge       Assessment   Performance deficits / Impairments: Decreased functional mobility ; Decreased ADL status; Decreased safe awareness;Decreased cognition;Decreased endurance;Decreased balance;Decreased high-level IADLs  Assessment: Pt agreeable to OT eval. Pt completes bed mobility with CGA after education on log roll tech. Pt completed functional transfers and functional mobility with CGA and RW use. Pt demo CGA for UB dressing task this date. Pt is limited by cognition, endurance, and balance and would benefit from continued OT services to increase independence and safety with ADLs/IADLs  Prognosis: Good  Decision Making: Medium Complexity  Patient Education: Pt ed on OT role, OT POC, safety awareness, transfer training, DME use, EC/WS, and importance of continued OT. Pt verbalized good understanding  REQUIRES OT FOLLOW UP: Yes  Activity Tolerance  Activity Tolerance: Patient Tolerated treatment well  Safety Devices  Safety Devices in place: Yes  Type of devices: Nurse notified; Left in bed;Gait belt;Call light within reach; Bed alarm in place  Restraints  Initially in place: No           Patient Diagnosis(es): There were no encounter diagnoses. has a past medical history of Chronic back pain, Neuropathy, and Stroke ([facility] [address] has a past surgical history that includes Hysterectomy; Appendectomy; Abdomen surgery (); Fixation Kyphoplasty (7/3/14); and Spine surgery (N/A, 5/10/2021). Restrictions  Restrictions/Precautions  Restrictions/Precautions: Fall Risk  Required Braces or Orthoses?: No  Position Activity Restriction  Other position/activity restrictions: up with assist    Subjective   General  Patient assessed for rehabilitation services?: Yes  Family / Caregiver Present: Yes (son and daughter in law present)  General Comment  Comments: RN ok'd pt for OT this date. Pt agreeable to session and pleasant/cooperative throughout  Patient Currently in Pain: Denies  Pain Assessment  Pain Assessment: 0-10  Vital Signs  Patient Currently in Pain: Denies     Social/Functional History  Social/Functional History  Lives With: Alone (Son checks on her daily)  Type of Home: House  Home Layout: Multi-level, Able to Live on Main level with bedroom/bathroom  Home Access: Stairs to enter with rails  Entrance Stairs - Number of Steps: 3  Entrance Stairs - Rails: Left  Bathroom Shower/Tub: Tub/Shower unit, Shower chair without back, Curtain  Bathroom Toilet: Standard  Bathroom Equipment: Grab bars in shower, Hand-held shower  Bathroom Accessibility: Accessible  Home Equipment: Cane, Rolling walker  Receives Help From: Family  ADL Assistance: Independent  Homemaking Responsibilities: Yes  Ambulation Assistance: Independent  Transfer Assistance: Independent  Active : No  Patient's  Info: Son  Mode of Transportation: Car  Additional Comments: Son and daughter in law present to provide social/functional hx d/t pt questionable hx and [place].  Family is supportive and lives next door to pt and could provide assistance PRN       Objective   Vision: Impaired  Vision Exceptions: Wears glasses at all times (Needs new glassess)  Hearing: Exceptions to [facility]  Hearing Exceptions: Hard of hearing/hearing concerns         Balance  Sitting Balance: Stand by assistance  Standing Balance: Contact guard assistance  Functional Mobility  Functional - Mobility Device: Rolling Walker  Activity: Other  Assist Level: Contact guard assistance  Functional Mobility Comments: Pt completed functional mobility within room with RW and CGA throughout; no gross LOB     ADL  Feeding: Modified independent ;Setup  Grooming: Modified independent ;Setup  UE Bathing: Contact guard assistance;Setup;Verbal cueing; Increased time to complete  LE Bathing: Contact guard assistance;Setup;Verbal cueing; Increased time to complete  UE Dressing: Contact guard assistance;Setup;Verbal cueing; Increased time to complete (donned gown to back with CGA and VCs for proper alignment)  LE Dressing: Contact guard assistance;Setup;Verbal cueing; Increased time to complete  Toileting: Contact guard assistance;Setup; Increased time to complete  Tone RUE  RUE Tone: Normotonic  Tone LUE  LUE Tone: Normotonic  Coordination  Movements Are Fluid And Coordinated: Yes    Bed mobility  Rolling to Right: Contact guard assistance  Supine to Sit: Contact guard assistance  Sit to Supine: Contact guard assistance  Comment: VCs for log roll tech with good follow through  Transfers  Sit to stand: Contact guard assistance  Stand to sit: Contact guard assistance    Cognition  Overall Cognitive Status: Exceptions  Safety Judgement: Decreased awareness of need for assistance;Decreased awareness of need for safety  Problem Solving: Assistance required to identify errors made;Assistance required to correct errors made  Insights: Decreased awareness of deficits  Initiation: Requires cues for some       Sensation  Overall Sensation Status: WFL (pt denies numbness/tingling at this time)        LUE AROM (degrees)  LUE AROM : WFL  Left Hand AROM (degrees)  Left Hand AROM: WFL  RUE AROM (degrees)  RUE AROM : WFL  Right Hand AROM (degrees)  Right Hand AROM: WFL  LUE Strength  Gross LUE Strength: WFL  L Hand General: 4/5  LUE Strength Comment: grossly 4/5  RUE Strength  Gross RUE Strength: WFL  R Hand General: 4/5  RUE Strength Comment: grossly 4/5                   Plan   Plan  Times per week: 3-4 x/wk  Current Treatment Recommendations: Balance Training, Functional Mobility Training, Endurance Training, Cognitive Reorientation, Safety Education & Training, Patient/Caregiver Education & Training, Equipment Evaluation, Education, & procurement, Self-Care / ADL, Home Management Training    AM-PAC Score        AM-PAC Inpatient Daily Activity Raw Score: 20 (08/02/21 1557)  AM-PAC Inpatient ADL T-Scale Score : 42.03 (08/02/21 1557)  ADL Inpatient CMS 0-100% Score: 38.32 (08/02/21 1557)  ADL Inpatient CMS G-Code Modifier : Juan Jose Leblanc (08/02/21 1557)    Goals  Short term goals  Time Frame for Short term goals: By discharge, pt will:  Short term goal 1: Demo Mod I with use of LRD for functional transfers and functional mobility  Short term goal 2: Demo I with UB ADLs and grooming tasks  Short term goal 3: Demo I with LB ADLs and toileting tasks  Short term goal 4: Demo good safety awareness throughout therapy session with <2 VCs  Short term goal 5: Verbalize 2+ fall prevention strategies to incorporate upon return home  Short term goal 6: Demo 8+ min dynamic standing task to increase balance for safety and independence with ADLs/IADLs       Therapy Time   Individual Concurrent Group Co-treatment   Time In 1122         Time Out 1137         Minutes 15         Timed Code Treatment Minutes: 12 Minutes       Parmjit Davis OTR/L

## 2021-08-02 NOTE — PROGRESS NOTES
Patient admitted, consent signed and questions answered. Patient ready for procedure. Call light to reach with side rails up 2 of 2. GROIN clipped. FAMILY at bedside with patient. History and physical COMPLETED.

## 2021-08-02 NOTE — PROGRESS NOTES
fractures s/p kyphoplasty and CAD who presents initially to [facility] after a fall earlier today. States she was hanging clothes on the clothes line with she 'lost her balance and fell back'. She denies any associated dizziness/light headedness, CP, SOB, trauma/injury or hitting head. With history of chronic back pain, patient denies any change from chronic pain characteristics and has been ambulating without difficulty. Patient states she lives alone with family who lives close by and visits daily. Pertinent presenting labs include initial EKG: A. fib with competing junctional pacemaker with HR: 76, proBNP: 1498, troponin: 12, bilirubin: 1.78, glucose: 105, hemoglobin: 10.7, D-dimer: 1.37, UA: 2+ bacteria and trace leuk esterase, CT chest: No infiltrates or effusions, no central filling defects within the pulmonary arterial tree however distal PE cannot be excluded, lumbar spine XR: Mild superior endplate deformity of L1 and vertebral augmentation at T12, L2 and L4, thoracic spine XR: Age-indeterminate anterior wedging of T11 and 2D echo moderate LVH, hyperdynamic LV function with EF: 75 to 80%, aortic valve sclerosis without stenosis, severe MR, moderate TR with RVSP: 40.  Cardiology was consulted from outlying facility, patient anticoagulated with 1 mg/kg Lovenox and cardiology recommends transfer to our facility for further management.       On my evaluation, patient is resting in bed and is hemodynamically stable. Remains in A. fib with HR: 70s. Patient is currently denying any symptoms and cardiac murmur heard best at the apex and axillary region. No evidence of clinical distress at this time. \"    Review of Systems:     Constitutional:  negative for chills, fevers, sweats  Respiratory:  negative for cough, dyspnea on exertion, shortness of breath, wheezing  Cardiovascular:  negative for chest pain, chest pressure/discomfort, lower extremity edema, palpitations  Gastrointestinal:  negative for abdominal pain, constipation, diarrhea, nausea, vomiting  Neurological:  negative for dizziness, headache    Medications: Allergies: Allergies   Allergen Reactions    Asa [Aspirin]     Codeine     Pcn [Penicillins]        Current Meds:   Scheduled Meds:    metoprolol tartrate  25 mg Oral BID    clopidogrel  75 mg Oral Daily    citalopram  20 mg Oral Daily    sodium chloride flush  5-40 mL Intravenous 2 times per day    enoxaparin  1 mg/kg Subcutaneous BID    furosemide  20 mg Oral Daily     Continuous Infusions:    sodium chloride       PRN Meds: sodium chloride flush, sodium chloride, ondansetron **OR** ondansetron, polyethylene glycol, acetaminophen **OR** acetaminophen    Data:     Past Medical History:   has a past medical history of Chronic back pain, Neuropathy, and Stroke ([facility] [address] Social History:   reports that she has never smoked. She has never used smokeless tobacco. She reports that she does not drink alcohol and does not use drugs. Family History: No family history on file. Vitals:  BP (!) 112/55   Pulse 64   Temp 98.8 °F (37.1 °C) (Oral)   Resp 21   Ht 4' 11\" (1.499 m)   Wt 111 lb (50.3 kg)   SpO2 96%   BMI 22.42 kg/m²   Temp (24hrs), Av.5 °F (36.9 °C), Min:98.2 °F (36.8 °C), Max:98.8 °F (37.1 °C)    No results for input(s): POCGLU in the last 72 hours. I/O (24Hr):     Intake/Output Summary (Last 24 hours) at 2021 1422  Last data filed at 2021 1800  Gross per 24 hour   Intake --   Output 300 ml   Net -300 ml       Labs:  Hematology:  Recent Labs     21  1013 21  0656 21  0439   WBC  --  7.0 6.6   RBC  --  3.33* 3.45*   HGB  --  10.3* 10.8*   HCT  --  33.2* 34.3*   MCV  --  99.7 99.4   MCH  --  30.9 31.3   MCHC  --  31.0 31.5   RDW  --  16.2* 16.2*   PLT  --  See Reflexed IPF Result See Reflexed IPF Result   MPV  --  NOT REPORTED NOT REPORTED   INR 1.0  --   --      Chemistry:  Recent Labs     21  1013 21  0656 21  0439    140 138   K 4.1 4.1 3.9    103 102   CO2 20 26 25   GLUCOSE 97 94 92   BUN 10 10 16   CREATININE 0.40* 0.45* 0.60   MG 2.0  --   --    ANIONGAP 14 11 11   LABGLOM >60 >60 >60   GFRAA >60 >60 >60   CALCIUM 8.6 8.6 8.4*   PROBNP 1,845*  --   --    TROPHS 12  --   --      Recent Labs     07/31/21  1013   PROT 5.8*   LABALBU 3.4*   TSH 1.88   AST 16   ALT 8   ALKPHOS 62   BILITOT 1.27*     ABG:No results found for: POCPH, PHART, PH, POCPCO2, HAU4FRO, PCO2, POCPO2, PO2ART, PO2, POCHCO3, AEG4HVL, HCO3, NBEA, PBEA, BEART, BE, THGBART, THB, AZV6IPT, TYSL6ZQP, N9EQLRRR, O2SAT, FIO2  Lab Results   Component Value Date/Time    SPECIAL NOT REPORTED 07/30/2021 10:45 AM     Lab Results   Component Value Date/Time    CULTURE NO SIGNIFICANT GROWTH 07/30/2021 10:45 AM       Radiology:  XR CHEST (2 VW)    Result Date: 7/30/2021  No acute pulmonary findings. Mild cardiomegaly. Diffuse osseous demineralization with multilevel compression fractures as above which appear grossly similar to prior. XR THORACIC SPINE (2 VIEWS)    Result Date: 7/30/2021  Age indeterminate anterior wedging of T11. XR LUMBAR SPINE (2-3 VIEWS)    Result Date: 7/30/2021  1. Mild superior endplate deformity of L1 appears new in the interval. 2.  Vertebral augmentation at T12, L2 and L4.     CT CHEST PULMONARY EMBOLISM W CONTRAST    Result Date: 7/30/2021  No evidence for infiltrates or effusions. No central filling defects are noted in the pulmonary arterial tree however there is excessive artifact which may be secondary to respiratory motion which limits evaluation of the pulmonary arteries beyond the tertiary branching point. Possible of distal pulmonary emboli cannot be excluded on this study.  No evidence for right ventricular strain       Physical Examination:        General appearance:  alert, cooperative and no distress  Mental Status:  oriented to person, place and time and normal affect  Lungs:  clear to auscultation bilaterally, normal effort  Heart:  irregular rate and rhythm, no murmur  Abdomen:  soft, nontender, nondistended, normal bowel sounds, no masses, hepatomegaly, splenomegaly  Extremities:  no edema, redness, tenderness in the calves  Skin:  no gross lesions, rashes, induration    Assessment:        Hospital Problems         Last Modified POA    * (Principal) New onset atrial fibrillation ([address] 7/31/2021 Yes    Osteoporosis with pathological fracture 7/31/2021 Yes    Nonrheumatic mitral valve regurgitation 7/31/2021 Yes    Essential hypertension 7/31/2021 Yes    Fall 7/31/2021 Yes    Normocytic normochromic anemia 7/31/2021 Yes    Depression 7/31/2021 Yes          Plan:        1. 2D echo from July 30 reveals EF 75 to 80%. Severe mitral regurg. Cardiology consultation obtained and patient on full dose Lovenox as well as beta-blocker for rate control. 2. Per cardiology consult note, plan for DARRIN today to assess severity of MR. If severity not due to HOCM/LIDYA, will likely proceed with right and left heart cath. If MRI is less severe than cardiology recommendation treat medically  3. Blood pressure well controlled on current regimen  4. On Celexa for depression  5.  Further mgmt post procedure    Martin Torres MD  8/2/2021  2:22 PM

## 2021-08-02 NOTE — PROGRESS NOTES
Physical Therapy  Facility/Department: [facility]  Daily Treatment Note  NAME: Lady Jovel  : 1940  MRN: [id number]    Date of Service: 2021    Discharge Recommendations:  Patient would benefit from continued therapy after discharge        Assessment     Body structures, Functions, Activity limitations: Decreased functional mobility ; Decreased balance;Decreased endurance;Decreased strength  Assessment: Presents with generalized weakness/decreased ndurance, needs CGA for mobility at this time. Will need assistnce from family/caregiver for mobility at discharge. Will recommend conintued physical therapy to improve fucntion while here as well as home care if DC home. Treatment Diagnosis: Impaired fucntion. Prognosis: Good  Decision Making: Medium Complexity  History: has a past medical history of Chronic back pain, Neuropathy, and Stroke ([facility] [address] Barriers to Learning: [place]  REQUIRES PT FOLLOW UP: Yes  Activity Tolerance  Activity Tolerance: Patient limited by fatigue;Patient limited by endurance         Patient Diagnosis(es): There were no encounter diagnoses. has a past medical history of Chronic back pain, Neuropathy, and Stroke ([facility] [address] has a past surgical history that includes Hysterectomy; Appendectomy; Abdomen surgery (); Fixation Kyphoplasty (7/3/14); and Spine surgery (N/A, 5/10/2021). Restrictions  Position Activity Restriction  Other position/activity restrictions: L2/L4 kyphoplasty 2021. Subjective   Pt in bed eager to go for a walk. Pt has no c/o pain. Orientation    Overall Orientation Status: Within Functional Limits  Objective     Bed mobility  Rolling to Right: Contact guard assistance  Supine to Sit: Contact guard assistance  Scooting: Contact guard assistance  Comment: No dizzyness reports, slight neuropathic pain L  LE-chronic per pt.   Transfers  Sit to Stand: Contact guard assistance  Stand to sit: Contact guard assistance  Ambulation  Ambulation?: Yes  Ambulation 1  Surface: level tile  Device: Rolling Walker  Assistance: Contact guard assistance  Quality of Gait: wide JANINA, slight unsteady, fatiques easily, not safe to ambualte without rolling walker at this time. Distance: 48' x 2 Chair to follow.   .Balance  Posture: Fair  Sitting - Static: Good  Sitting - Dynamic: Fair  Standing - Static: Fair;+ (RW)  Standing - Dynamic: Fair (RW)   AM-PAC Score  AM-PAC Inpatient Mobility Raw Score : 16 (08/02/21 1501)  AM-PAC Inpatient T-Scale Score : 40.78 (08/02/21 1501)  Mobility Inpatient CMS 0-100% Score: 54.16 (08/02/21 1501)  Mobility Inpatient CMS G-Code Modifier : CK (08/02/21 1501)   Goals  Short term goals  Time Frame for Short term goals: 7 to 10 visits  Short term goal 1:  Pt able to perform supine<>sit SBA  Short term goal 2: Pt able to perform sit to stnd and transfers SBA  Short term goal 3: Pt able to ambulate with RW distance fo 100 ft, SBA  Short term goal 4: Pt to improve standing dynamic baalnce with assistive device to Good- to reduce fall risk  Short term goal 5:  Pt to tolrate activity for 20 to 30 minutes to imrpove function/mobility  Patient Goals   Patient goals : Son's goal-maintian mobility/strength    Plan    Plan  Times per week: 5 to6x/week  Current Treatment Recommendations: Strengthening, Balance Training, Functional Mobility Training, Transfer Training, Endurance Training, Gait Training, Home Exercise Program, Safety Education & Training, Patient/Caregiver Education & Training  Safety Devices  Type of devices: Gait belt, Bed alarm in place, Left in alarmed chair, Nurse notified     Therapy Time   Individual Concurrent Group Co-treatment   Time In  230         Time Out  300         Minutes  [address] [place] [address]

## 2021-08-02 NOTE — PROGRESS NOTES
[facility]  Progress Note                   Date:   8/2/2021  Patient name: Tiffany Valdez  Date of admission:  7/31/2021  1:08 AM  MRN:   [id number]  YOB: 1940  PCP: Keri Gonzales MD    Reason for Admission: New onset atrial fibrillation ([facility] [address] [I48.91]    Subjective:       Clinical Changes /Abnormalities: After discussion with RN, patient seen and examined with family members present at the bed side. No new acute events overnight. Patient is doing well, has no complaints. Denies chest pain or SOB. Reviewed vitals, labs, tele, & previous testing. A FIB on tele Rate controlled 60's. Medications:   Scheduled Meds:   clopidogrel  75 mg Oral Daily    citalopram  20 mg Oral Daily    sodium chloride flush  5-40 mL Intravenous 2 times per day    enoxaparin  1 mg/kg Subcutaneous BID    metoprolol tartrate  12.5 mg Oral BID    furosemide  20 mg Oral Daily     Continuous Infusions:   sodium chloride       CBC:   Recent Labs     08/01/21  0656 08/02/21  0439   WBC 7.0 6.6   HGB 10.3* 10.8*   PLT See Reflexed IPF Result See Reflexed IPF Result     BMP:    Recent Labs     07/31/21  1013 08/01/21  0656 08/02/21  0439    140 138   K 4.1 4.1 3.9    103 102   CO2 20 26 25   BUN 10 10 16   CREATININE 0.40* 0.45* 0.60   GLUCOSE 97 94 92     Hepatic:  Recent Labs     07/31/21  1013   AST 16   ALT 8   BILITOT 1.27*   ALKPHOS 62     Troponin:   Recent Labs     07/30/21  1140 07/31/21  1013   TROPHS 12 12     BNP: No results for input(s): BNP in the last 72 hours. Lipids: No results for input(s): CHOL, HDL in the last 72 hours. Invalid input(s): LDLCALCU  INR:   Recent Labs     07/31/21  1013   INR 1.0     DATA:    Diagnostics:    EKG: Multiple PACs. ECHO:  07/30/2021  Summary  Normal left ventricular diameter. Moderate left ventricular hypertrophy. Hyperdynamic left ventricular function. Left ventricular ejection fraction  75 to 80 %. Left atrium is mildly dilated. Right atrium is mildly dilated . Aortic valve sclerosis without stenosis. Increased velocity of LVOT and AV flow due to hyperdynamic state of the left  ventricle. Extensive mitral annular calcification. Thickened mitral valve leaflets. Mild mitral stenosis. Mean gradient is 5 mm Hg. Severe mitral regurgitation, eccentric, posteriorly directed, with coanda  effect. Mild to moderate tricuspid regurgitation. Estimated right ventricular systolic pressure is 40 mmHg. Mild pulmonary  hypertension. IVC Increased diameter, but still has inspiratory variation suggesting upper  normal or mildly elevated RA filling pressure (i.e. CVP) . Objective:   Vitals: /68   Pulse 68   Temp 98.4 °F (36.9 °C) (Oral)   Resp 26   Ht 4' 11\" (1.499 m)   Wt 111 lb (50.3 kg)   SpO2 96%   BMI 22.42 kg/m²   General appearance: alert and cooperative with exam  HEENT: Head: Normocephalic, no lesions, without obvious abnormality. Neck:no JVD, trachea midline, no adenopathy  Lungs: Clear to auscultation throughout. Room air without distress. Heart: Irregular rate and rhythm, s1/s2 auscultated, + murmurs  A fib rate controlled. Abdomen: soft, non-tender, bowel sounds active  Extremities: no edema  Neurologic: not done        Assessment / Acute Cardiac Problems:   1. New onset A Fib  2. New onset severe Mitral regurgitation ECHO reviewed by Dr Briana Macdonald  3. HTN    Patient Active Problem List:     Compression fracture of T12 vertebra (HCC)     Osteoporosis with pathological fracture     New onset atrial fibrillation (HCC)     Nonrheumatic mitral valve regurgitation     Essential hypertension     Fall     Normocytic normochromic anemia     Depression      Plan of Treatment:   1. Echo reviewed by Dr. Walter Rendon. Plan for DARRIN today to assess severity of MR on ECHO. Per Dr Briana Macdonald - If severity not due to HOCM/LIDYA proceed with R and L heart cath. If MR is < severe treat medically.   2. I have discussed risks (including but not limited to vascular injury, infection, hematoma, contrast induced kidney dysfunction, CVA and MI), benefits, alternatives in detail. All questions answered. Patient agrees to proceed. 3. A Fib Rate controlled. Continue BB and SC Lovenox. 4. HTN. Stable. Continue BB  5. Keep K > 4.0 and Mag > 2.0 stable  6.  Rest of care per Primary Team.     Electronically signed by GOYO Bryant CNP on 8/2/2021 at 11:07 [facility].  [phone]

## 2021-08-02 NOTE — PLAN OF CARE
Problem:  Activity:  Goal: Ability to tolerate increased activity will improve  Description: Ability to tolerate increased activity will improve  Outcome: Ongoing  Goal: Expression of feelings of increased energy will increase  Description: Expression of feelings of increased energy will increase  Outcome: Ongoing     Problem: Cardiac:  Goal: Ability to maintain an adequate cardiac output will improve  Description: Ability to maintain an adequate cardiac output will improve  Outcome: Ongoing  Goal: Complications related to the disease process, condition or treatment will be avoided or minimized  Description: Complications related to the disease process, condition or treatment will be avoided or minimized  Outcome: Ongoing     Problem: Coping:  Goal: Level of anxiety will decrease  Description: Level of anxiety will decrease  Outcome: Ongoing  Goal: General experience of comfort will improve  Description: General experience of comfort will improve  Outcome: Ongoing     Problem: Health Behavior:  Goal: Ability to manage health-related needs will improve  Description: Ability to manage health-related needs will improve  Outcome: Ongoing     Problem: Safety:  Goal: Ability to remain free from injury will improve  Description: Ability to remain free from injury will improve  Outcome: Ongoing  Goal: Will show no signs and symptoms of excessive bleeding  Description: Will show no signs and symptoms of excessive bleeding  Outcome: Ongoing     Problem: Falls - Risk of:  Goal: Will remain free from falls  Description: Will remain free from falls  Outcome: Ongoing  Goal: Absence of physical injury  Description: Absence of physical injury  Outcome: Ongoing     Problem: Skin Integrity:  Goal: Will show no infection signs and symptoms  Description: Will show no infection signs and symptoms  Outcome: Ongoing  Goal: Absence of new skin breakdown  Description: Absence of new skin breakdown  Outcome: Ongoing     Problem: Musculor/Skeletal Functional Status  Goal: Highest potential functional level  Outcome: Ongoing  Goal: Absence of falls  Outcome: Ongoing

## 2021-08-03 ENCOUNTER — TELEPHONE (OUTPATIENT)
Dept: CARDIOLOGY | Age: 81
End: 2021-08-03

## 2021-08-03 VITALS
SYSTOLIC BLOOD PRESSURE: 95 MMHG | OXYGEN SATURATION: 96 % | WEIGHT: 111 LBS | DIASTOLIC BLOOD PRESSURE: 62 MMHG | TEMPERATURE: 98.2 F | RESPIRATION RATE: 19 BRPM | HEIGHT: 59 IN | BODY MASS INDEX: 22.38 KG/M2 | HEART RATE: 72 BPM

## 2021-08-03 DIAGNOSIS — I48.91 NEW ONSET ATRIAL FIBRILLATION (HCC): Primary | ICD-10-CM

## 2021-08-03 LAB
ABSOLUTE EOS #: 0.17 K/UL (ref 0–0.44)
ABSOLUTE IMMATURE GRANULOCYTE: 0.05 K/UL (ref 0–0.3)
ABSOLUTE LYMPH #: 1.4 K/UL (ref 1.1–3.7)
ABSOLUTE MONO #: 0.64 K/UL (ref 0.1–1.2)
ANION GAP SERPL CALCULATED.3IONS-SCNC: 12 MMOL/L (ref 9–17)
BASOPHILS # BLD: 2 % (ref 0–2)
BASOPHILS ABSOLUTE: 0.11 K/UL (ref 0–0.2)
BUN BLDV-MCNC: 15 MG/DL (ref 8–23)
BUN/CREAT BLD: ABNORMAL (ref 9–20)
CALCIUM SERPL-MCNC: 8.3 MG/DL (ref 8.6–10.4)
CHLORIDE BLD-SCNC: 106 MMOL/L (ref 98–107)
CO2: 22 MMOL/L (ref 20–31)
CREAT SERPL-MCNC: 0.5 MG/DL (ref 0.5–0.9)
DIFFERENTIAL TYPE: ABNORMAL
EOSINOPHILS RELATIVE PERCENT: 3 % (ref 1–4)
GFR AFRICAN AMERICAN: >60 ML/MIN
GFR NON-AFRICAN AMERICAN: >60 ML/MIN
GFR SERPL CREATININE-BSD FRML MDRD: ABNORMAL ML/MIN/{1.73_M2}
GFR SERPL CREATININE-BSD FRML MDRD: ABNORMAL ML/MIN/{1.73_M2}
GLUCOSE BLD-MCNC: 88 MG/DL (ref 70–99)
HCT VFR BLD CALC: 33.2 % (ref 36.3–47.1)
HEMOGLOBIN: 10.6 G/DL (ref 11.9–15.1)
IMMATURE GRANULOCYTES: 1 %
INR BLD: 1
LYMPHOCYTES # BLD: 23 % (ref 24–43)
MCH RBC QN AUTO: 31.2 PG (ref 25.2–33.5)
MCHC RBC AUTO-ENTMCNC: 31.9 G/DL (ref 28.4–34.8)
MCV RBC AUTO: 97.6 FL (ref 82.6–102.9)
MONOCYTES # BLD: 11 % (ref 3–12)
NRBC AUTOMATED: 0 PER 100 WBC
PDW BLD-RTO: 16.1 % (ref 11.8–14.4)
PLATELET # BLD: ABNORMAL K/UL (ref 138–453)
PLATELET ESTIMATE: ABNORMAL
PLATELET, FLUORESCENCE: 150 K/UL (ref 138–453)
PLATELET, IMMATURE FRACTION: 14.5 % (ref 1.1–10.3)
PMV BLD AUTO: ABNORMAL FL (ref 8.1–13.5)
POTASSIUM SERPL-SCNC: 3.7 MMOL/L (ref 3.7–5.3)
PROTHROMBIN TIME: 10.6 SEC (ref 9.1–12.3)
RBC # BLD: 3.4 M/UL (ref 3.95–5.11)
RBC # BLD: ABNORMAL 10*6/UL
SEG NEUTROPHILS: 61 % (ref 36–65)
SEGMENTED NEUTROPHILS ABSOLUTE COUNT: 3.65 K/UL (ref 1.5–8.1)
SODIUM BLD-SCNC: 140 MMOL/L (ref 135–144)
WBC # BLD: 6 K/UL (ref 3.5–11.3)
WBC # BLD: ABNORMAL 10*3/UL

## 2021-08-03 PROCEDURE — 6370000000 HC RX 637 (ALT 250 FOR IP): Performed by: NURSE PRACTITIONER

## 2021-08-03 PROCEDURE — 80048 BASIC METABOLIC PNL TOTAL CA: CPT

## 2021-08-03 PROCEDURE — 85025 COMPLETE CBC W/AUTO DIFF WBC: CPT

## 2021-08-03 PROCEDURE — 36415 COLL VENOUS BLD VENIPUNCTURE: CPT

## 2021-08-03 PROCEDURE — 85610 PROTHROMBIN TIME: CPT

## 2021-08-03 PROCEDURE — 99239 HOSP IP/OBS DSCHRG MGMT >30: CPT | Performed by: STUDENT IN AN ORGANIZED HEALTH CARE EDUCATION/TRAINING PROGRAM

## 2021-08-03 PROCEDURE — 2580000003 HC RX 258: Performed by: NURSE PRACTITIONER

## 2021-08-03 PROCEDURE — 85055 RETICULATED PLATELET ASSAY: CPT

## 2021-08-03 PROCEDURE — 6370000000 HC RX 637 (ALT 250 FOR IP): Performed by: STUDENT IN AN ORGANIZED HEALTH CARE EDUCATION/TRAINING PROGRAM

## 2021-08-03 PROCEDURE — 97535 SELF CARE MNGMENT TRAINING: CPT

## 2021-08-03 PROCEDURE — 6360000002 HC RX W HCPCS: Performed by: NURSE PRACTITIONER

## 2021-08-03 PROCEDURE — 97530 THERAPEUTIC ACTIVITIES: CPT

## 2021-08-03 RX ORDER — AMIODARONE HYDROCHLORIDE 200 MG/1
200 TABLET ORAL DAILY
Status: DISCONTINUED | OUTPATIENT
Start: 2021-08-03 | End: 2021-08-03 | Stop reason: HOSPADM

## 2021-08-03 RX ORDER — FUROSEMIDE 20 MG/1
20 TABLET ORAL DAILY
Qty: 60 TABLET | Refills: 3 | Status: SHIPPED | OUTPATIENT
Start: 2021-08-04 | End: 2021-08-16 | Stop reason: SDUPTHER

## 2021-08-03 RX ORDER — POTASSIUM CHLORIDE 20 MEQ/1
20 TABLET, EXTENDED RELEASE ORAL ONCE
Status: COMPLETED | OUTPATIENT
Start: 2021-08-03 | End: 2021-08-03

## 2021-08-03 RX ORDER — AMIODARONE HYDROCHLORIDE 200 MG/1
200 TABLET ORAL DAILY
Qty: 60 TABLET | Refills: 1 | Status: SHIPPED | OUTPATIENT
Start: 2021-08-03

## 2021-08-03 RX ORDER — WARFARIN SODIUM 2 MG/1
2 TABLET ORAL DAILY
Qty: 30 TABLET | Refills: 3 | Status: SHIPPED | OUTPATIENT
Start: 2021-08-03

## 2021-08-03 RX ORDER — WARFARIN SODIUM 2 MG/1
2 TABLET ORAL ONCE
Status: COMPLETED | OUTPATIENT
Start: 2021-08-03 | End: 2021-08-03

## 2021-08-03 RX ADMIN — AMIODARONE HYDROCHLORIDE 200 MG: 200 TABLET ORAL at 11:24

## 2021-08-03 RX ADMIN — CITALOPRAM 20 MG: 20 TABLET, FILM COATED ORAL at 09:50

## 2021-08-03 RX ADMIN — WARFARIN SODIUM 2 MG: 2 TABLET ORAL at 14:16

## 2021-08-03 RX ADMIN — ENOXAPARIN SODIUM 50 MG: 60 INJECTION SUBCUTANEOUS at 09:48

## 2021-08-03 RX ADMIN — SODIUM CHLORIDE, PRESERVATIVE FREE 10 ML: 5 INJECTION INTRAVENOUS at 09:50

## 2021-08-03 RX ADMIN — CLOPIDOGREL 75 MG: 75 TABLET, FILM COATED ORAL at 09:50

## 2021-08-03 RX ADMIN — METOPROLOL TARTRATE 25 MG: 25 TABLET ORAL at 09:50

## 2021-08-03 RX ADMIN — FUROSEMIDE 20 MG: 20 TABLET ORAL at 09:50

## 2021-08-03 RX ADMIN — POTASSIUM CHLORIDE 20 MEQ: 1500 TABLET, EXTENDED RELEASE ORAL at 11:24

## 2021-08-03 RX ADMIN — DILTIAZEM HYDROCHLORIDE 30 MG: 30 TABLET, FILM COATED ORAL at 11:24

## 2021-08-03 ASSESSMENT — PAIN SCALES - GENERAL: PAINLEVEL_OUTOF10: 0

## 2021-08-03 NOTE — PROGRESS NOTES
Occupational Therapy  Facility/Department: [facility] CAR 2  Daily Treatment Note  NAME: Jim Mg  : 1940  MRN: [id number]    Date of Service: 8/3/2021    Discharge Recommendations:  Patient would benefit from continued therapy after discharge in order to increase pt balance, safety and independence. If pt to return to prior living arrangements pt would req / assist at increased risk for falls. Orthostatic BP below. Assessment   Performance deficits / Impairments: Decreased functional mobility ; Decreased ADL status; Decreased safe awareness;Decreased cognition;Decreased endurance;Decreased balance;Decreased high-level IADLs;Decreased posture  Prognosis: Good  OT Education: OT Role;Transfer Training;Precautions  Patient Education: purpose of OT; proper hand and foot placement; walker management; balance maintaince  Barriers to Learning: pt demo P carry over  REQUIRES OT FOLLOW UP: Yes  Activity Tolerance  Activity Tolerance: Patient Tolerated treatment well;Patient limited by fatigue;Treatment limited secondary to decreased cognition  Safety Devices  Safety Devices in place: Yes  Type of devices: Gait belt;Patient at risk for falls; Left in chair;Chair alarm in place;Call light within reach;Nurse notified  Restraints  Initially in place: No         Patient Diagnosis(es): There were no encounter diagnoses. has a past medical history of Chronic back pain, Neuropathy, and Stroke ([facility] [address] has a past surgical history that includes Hysterectomy; Appendectomy; Abdomen surgery (); Fixation Kyphoplasty (7/3/14); and Spine surgery (N/A, 5/10/2021).     Restrictions  Restrictions/Precautions  Restrictions/Precautions: Fall Risk  Required Braces or Orthoses?: No  Position Activity Restriction  Other position/activity restrictions: up with assist  Subjective   General  Chart Reviewed: Yes  Patient assessed for rehabilitation services?: Yes  Family / Caregiver Present: No  General Comment  Comments: Pt and RN agreeable to therapy this day. No facial grimacing or groaning noted throughout session. Orthostatic BP taken today witth pt BP: 99/55 supine, 103/74 sitting and 96/55 standing. Pt denied dizziness throughout session however notably fatigued. Pain Assessment  Pain Assessment: 0-10  Pain Level: 0  Pre Treatment Pain Screening  Comments / Details: Pt supine in bed at start of session. At session end pt seated in chair with BLE elevated, call light in reach, chair alarm on, and RN aware. Vital Signs  Patient Currently in Pain: Denies   Orientation  Orientation  Overall Orientation Status: Impaired  Orientation Level: Oriented to person  Objective    ADL  Grooming: Stand by assistance;Setup;Verbal cueing; Increased time to complete (oral care, hand hygiene and hair brushing completed standing at sink)  Toileting: Minimal assistance;Setup; Increased time to complete (Min A for personal hygiene and brief management)  Additional Comments: Pt declined showering this date despite [place] encouragement. Hair brushing and oral care completed standing at sink utilizing RW with 1-2 hand support for balance pt req cues for initiation. Oral care completed for approx 15 sec, pt completed declining further attempt. Urgent need for toileting noted. Pt able to pull down brief to thighs req assist to manage around toilet sec to heavy calf reliance for support. Min A to for personal hyiene to ensure thoroughness. Min A to pull brief up over hips sec to pt decreased balance pt able to pull L side of brief up req assist pulling R side up with RUE supported on RW. Pt limited during session per decreased cognition, decreased balance and P safety awareness.      Balance  Sitting Balance: Supervision (Pt tolerated approx 5 min static sitting at EOB utilizing 0-1 hand suppor for balance.)  Standing Balance: Stand by assistance  Standing Balance  Time: Pt tolerated approx 5-6 min  Activity: dynamic standing during ADLs  Comment: utilizing RW and sink for support with 0 LOB noted  Functional Mobility  Functional - Mobility Device: Rolling Walker  Activity: To/from bathroom; Retrieve items  Assist Level: Moderate assistance  Functional Mobility Comments: Pt req assist with walker management to maintain RW at JANINA. Item retrievel completed throughout room to obtain 5/5 objects from various heights and surfaces to increase pt balance and safety with ADLs/IADLs. Pt dmeo 0 LOB however P safety awareness and P spatial awareness. Approx 8-9 points of contact noted uring navigation and L/R lateral sides and at front of walker. Max  verbal/tactile cues provided pt demo P carry over. Toilet Transfers  Toilet - Technique: Ambulating  Equipment Used: Standard toilet  Toilet Transfer: Contact guard assistance  Toilet Transfers Comments: utilizing grab bars req verbal/visual cues  Bed mobility  Supine to Sit: Minimal assistance  Scooting: Contact guard assistance  Comment: HOB flat without bed rails  Transfers  Sit to stand: Minimal assistance  Stand to sit: Contact guard assistance  Transfer Comments: utilizing RW req mod verbal/tactile cues for proper hand placement     Cognition  Overall Cognitive Status: Exceptions  Arousal/Alertness: Delayed responses to stimuli  Following Commands: Follows multistep commands with repitition; Follows multistep commands with increased time  Attention Span: Attends with cues to redirect  Memory: Decreased short term memory  Safety Judgement: Decreased awareness of need for assistance;Decreased awareness of need for safety  Problem Solving: Assistance required to identify errors made;Assistance required to correct errors made;Assistance required to generate solutions;Assistance required to implement solutions;Decreased awareness of errors  Insights: Decreased awareness of deficits  Initiation: Requires cues for all  Sequencing: Requires cues for some      Plan   Plan  Times per week: 3-4 x/wk  Current Treatment Recommendations: Balance Training, Functional Mobility Training, Endurance Training, Cognitive Reorientation, Safety Education & Training, Patient/Caregiver Education & Training, Equipment Evaluation, Education, & procurement, Self-Care / ADL, Home Management Training    AM-PAC Score  AM-PAC Inpatient Daily Activity Raw Score: 19 (08/03/21 1203)  AM-PAC Inpatient ADL T-Scale Score : 40.22 (08/03/21 1203)  ADL Inpatient CMS 0-100% Score: 42.8 (08/03/21 1203)  ADL Inpatient CMS G-Code Modifier : CK (08/03/21 1203)    Goals  Short term goals  Time Frame for Short term goals: By discharge, pt will:  Short term goal 1: Demo Mod I with use of LRD for functional transfers and functional mobility  Short term goal 2: Demo I with UB ADLs and grooming tasks  Short term goal 3: Demo I with LB ADLs and toileting tasks  Short term goal 4: Demo good safety awareness throughout therapy session with <2 VCs  Short term goal 5: Verbalize 2+ fall prevention strategies to incorporate upon return home  Short term goal 6: Demo 8+ min dynamic standing task to increase balance for safety and independence with ADLs/IADLs       Therapy Time   Individual Concurrent Group Co-treatment   Time In 0918         Time Out 0950         Minutes 32         Timed Code Treatment Minutes: [address], [place]/L

## 2021-08-03 NOTE — DISCHARGE INSTR - COC
Continuity of Care Form    Patient Name: Nazia Chau   :  1940  MRN:  [id number]    Admit date:  2021  Discharge date:  8/3/21    Code Status Order: Full Code   Advance Directives:      Admitting Physician:  No admitting provider for patient encounter. PCP: Zan Perez MD    Discharging Nurse: Paulina Maynard RN  [address] Unit/Room#:   Discharging Unit Phone Number: [phone]    Emergency Contact:   Extended Emergency Contact Information  Primary Emergency Contact: Blossom Harry  Address: [address] [place] [address] [place]  Home Phone: [phone]  Mobile Phone: [phone]  Relation: Child    Past Surgical History:  Past Surgical History:   Procedure Laterality Date    ABDOMEN SURGERY      bowel resection for diverticulosis    APPENDECTOMY      FIXATION KYPHOPLASTY  7/3/14    T 12    HYSTERECTOMY      SPINE SURGERY N/A 5/10/2021    L2 ET L4 KYPHOPLASTY performed by Paris Alas MD at [address] [place] [address] OR       Immunization History: There is no immunization history on file for this patient. Active Problems:  Patient Active Problem List   Diagnosis Code    Compression fracture of T12 vertebra ([address] S22.080A    Osteoporosis with pathological fracture M80.00XA    New onset atrial fibrillation (HCC) I48.91    Nonrheumatic mitral valve regurgitation I34.0    Essential hypertension I10    Fall W19. Velvet Sour    Normocytic normochromic anemia D64.9    Depression F32.9       Isolation/Infection:   Isolation            No Isolation          Patient Infection Status       Infection Onset Added Last Indicated Last Indicated By Review Planned Expiration Resolved Resolved By    None active    Resolved    COVID-19 Rule Out 21 COVID-19 (Ordered)   21 Rule-Out Test Resulted            Nurse Assessment:  Last Vital Signs: /76   Pulse 66   Temp 98 °F (36.7 °C) (Oral)   Resp 19   Ht 4' 11\" (1.499 m)   Wt 111 lb (50.3 kg) SpO2 96%   BMI 22.42 kg/m²     Last documented pain score (0-10 scale): Pain Level: 0  Last Weight:   Wt Readings from Last 1 Encounters:   07/31/21 111 lb (50.3 kg)     Mental Status:  oriented and alert    IV Access:  - None    Nursing Mobility/ADLs:  Walking   Independent  Transfer  Independent  Bathing  Independent  Dressing  Independent  Toileting  Independent  Feeding  Independent  Med Admin  Assisted  Med Delivery   whole    Wound Care Documentation and Therapy:        Elimination:  Continence:   · Bowel: No  · Bladder: No  Urinary Catheter: None   Colostomy/Ileostomy/Ileal Conduit: No       Date of Last BM: 8/3/21    Intake/Output Summary (Last 24 hours) at 8/3/2021 1101  Last data filed at 8/2/2021 1832  Gross per 24 hour   Intake --   Output 300 ml   Net -300 ml     I/O last 3 completed shifts:  In: -   Out: 300 [Urine:300]    Safety Concerns: At Risk for Falls    Impairments/Disabilities:      None    Nutrition Therapy:  Current Nutrition Therapy:   - Oral Diet:  General    Routes of Feeding: Oral  Liquids: No Restrictions  Daily Fluid Restriction: no  Last Modified Barium Swallow with Video (Video Swallowing Test): not done    Treatments at the Time of Hospital Discharge:   Respiratory Treatments: None  Oxygen Therapy:  is not on home oxygen therapy.   Ventilator:    - No ventilator support    Rehab Therapies: None  Weight Bearing Status/Restrictions: No weight bearing restirctions  Other Medical Equipment (for information only, NOT a DME order):  wheelchair and cane  Other Treatments: None    Patient's personal belongings (please select all that are sent with patient):  Glasses, Dentures upper and lower, Clothes & Purse    RN SIGNATURE:  Electronically signed by Renee Evangelista RN on 8/3/21 at 12:47 PM EDT    CASE MANAGEMENT/SOCIAL WORK SECTION    Inpatient Status Date: 7/31/21    Readmission Risk Assessment Score:  Readmission Risk              Risk of Unplanned Readmission:  13 Discharging to Facility/ Agency   · Name: [address]  Address:  [address], [address] [place] [address] [address] [address]         Phone: [phone]       Fax: [phone]        ·   · Phone:  · Fax:    Dialysis Facility (if applicable)   · Name:  · Address:  · Dialysis Schedule:  · Phone:  · Fax:    / signature: Electronically signed by Bernardo Nelson RN on 8/3/21 at 12:49 PM EDT    PHYSICIAN SECTION    Prognosis: Fair    Condition at Discharge: Stable    Rehab Potential (if transferring to Rehab): Fair    Recommended Labs or Other Treatments After Discharge: INR CHECK IN 3 DAYS    Physician Certification: I certify the above information and transfer of Susan Cisneros  is necessary for the continuing treatment of the diagnosis listed and that she requires Home Care for greater 30 days.      Update Admission H&P: Changes in H&P as follows - DISCHARGE SUMMARY TO FOLLOW    PHYSICIAN SIGNATURE:  Electronically signed by Autumn Whitlock MD on 8/3/21 at 11:02 AM EDT

## 2021-08-03 NOTE — CARE COORDINATION
Case Management Initial Discharge Plan  Amanda Rizo,             Met with: patient and son to discuss discharge plans. Information verified: address, contacts, phone number, , insurance Yes  Insurance Provider: Medicare    Emergency Contact/Next of Kin name & number: son Rojean Felty [phone]  Who are involved in patient's support system? sons    PCP: Adrien Back MD  Date of last visit: 2021      Discharge Planning    Living Arrangements:  [address] 2 stories  3 stairs to climb to get into front door  Location of bedroom/bathroom in home main level    Patient able to perform ADL's:Independent    Current Services (outpatient & in home) none  DME equipment: walker, cane  DME provider:     Is patient receiving oral anticoagulation therapy? No      Potential Assistance Needed:  N/A    Patient agreeable to home care: No  [place] of choice provided:  n/a    Prior SNF/Rehab Placement and Facility: N/A  Agreeable to SNF/Rehab: No  [place] of choice provided: n/a     Evaluation: n/a    Expected Discharge date:  21    Patient expects to be discharged to:   home alone or son Terence's home    If home: is the family and/or caregiver willing & able to provide support at home? yes  Who will be providing this support? Son Rojean Felty    Follow Up Appointment: Best Day/ Time:      Transportation provider: george Juarez arrangements needed for discharge: No    Readmission Risk              Risk of Unplanned Readmission:  11             Does patient have a readmission risk score greater than 14?: No  If yes, follow-up appointment must be made within 7 days of discharge.      Goals of Care: heart rate regular      Educated patient and son Rojean Felty on transitional options, provided freedom of choice and are agreeable with plan      Discharge Plan: home alone or with george Genao in [facility]        Electronically signed by Evaristo Meeks RN on 21 at 11:32 AM EDT
[address]: Preventive Care And Screening: Unhealthy Alcohol Use - Screening: Patient screened for unhealthy alcohol use using a single question and scores less than 2 times per year
Transitional Planning    Was informed by RN that MD Alberto Jack wants patient to receive HC. Spoke with patient and son at bedside and they are agreeable to this. Provided her with a [facility] list and stated we'd come back and discuss it further. 1203 Informed by RN that patient choices are [facility] (first choice) and [facility] (second choice). Referrals sent to both. 1249 Received call from [employer] asking for more information. Provided information and confirmed PCP; they state they will be able to draw patient's INR for her at home on Friday and have it sent to her PCP. Need to confirm with patient what address she will going to.     1310 Confirmed address with patient and family of [address]. Relayed this information to Mary Carmen Foy at [facility]. Faxed MEG, HP, HC order, and progress notes to [facility] at [phone].     Discharge [address] Case Management Department  Written by: Jorge Adair RN    Patient Name: Denisa Robert  Attending Provider: Samantha Alvarado MD  Admit Date: 2021  1:08 AM  MRN: [id number]  Account: [de-identified]                     : 1940  Discharge Date: 8/3/21      Disposition: home with family support and [facility].    Jorge Adair RN
[address]: Preventive Care And Screening: Tobacco Use: Screening And Cessation Intervention: Patient screened for tobacco use and is an ex/non-smoker
Detail Level: Detailed
Quality 130: Documentation Of Current Medications In The Medical Record: Current Medications Documented

## 2021-08-03 NOTE — PROGRESS NOTES
[facility]  Progress Note                   Date:   8/3/2021  Patient name: Amanda Rizo  Date of admission:  7/31/2021  1:08 AM  MRN:   [id number]  YOB: 1940  PCP: Adrien Back MD    Reason for Admission: New onset atrial fibrillation ([facility] [address] [I48.91]    Subjective:       Clinical Changes /Abnormalities: Patient seen and examined at the bed side with family member present. No new acute events overnight. Patient is doing well, has no complaints. Denies chest pain or SOB. Reviewed vitals, labs, tele, & previous testing. Remains Afib with rate 70-80's. Discussed patient with Primary Physician and RN. Medications:   Scheduled Meds:   metoprolol tartrate  25 mg Oral BID    clopidogrel  75 mg Oral Daily    citalopram  20 mg Oral Daily    sodium chloride flush  5-40 mL Intravenous 2 times per day    enoxaparin  1 mg/kg Subcutaneous BID    furosemide  20 mg Oral Daily     Continuous Infusions:   sodium chloride       CBC:   Recent Labs     08/01/21  0656 08/02/21  0439 08/03/21  0425   WBC 7.0 6.6 6.0   HGB 10.3* 10.8* 10.6*   PLT See Reflexed IPF Result See Reflexed IPF Result See Reflexed IPF Result     BMP:    Recent Labs     08/01/21  0656 08/02/21  0439 08/03/21  0425    138 140   K 4.1 3.9 3.7    102 106   CO2 26 25 22   BUN 10 16 15   CREATININE 0.45* 0.60 0.50   GLUCOSE 94 92 88     Hepatic:  Recent Labs     07/31/21  1013   AST 16   ALT 8   BILITOT 1.27*   ALKPHOS 62     Troponin:   Recent Labs     07/31/21  1013   TROPHS 12     BNP: No results for input(s): BNP in the last 72 hours. Lipids: No results for input(s): CHOL, HDL in the last 72 hours. Invalid input(s): LDLCALCU  INR:   Recent Labs     07/31/21  1013   INR 1.0     DATA:    Diagnostics:    EKG: Multiple PACs. ECHO:  07/30/2021  Summary  Normal left ventricular diameter. Moderate left ventricular hypertrophy. Hyperdynamic left ventricular function.  Left ventricular ejection fraction  75 to 80 %. Left atrium is mildly dilated. Right atrium is mildly dilated . Aortic valve sclerosis without stenosis. Increased velocity of LVOT and AV flow due to hyperdynamic state of the left  ventricle. Extensive mitral annular calcification. Thickened mitral valve leaflets. Mild mitral stenosis. Mean gradient is 5 mm Hg. Severe mitral regurgitation, eccentric, posteriorly directed, with coanda  effect. Mild to moderate tricuspid regurgitation. Estimated right ventricular systolic pressure is 40 mmHg. Mild pulmonary  hypertension. IVC Increased diameter, but still has inspiratory variation suggesting upper  normal or mildly elevated RA filling pressure (i.e. CVP) . DARRIN 8/2/2021  DARRIN findings:  LA:       Severely enlarged   LENO:    No thrombus  RA:      Normal  RV:      Normal  LV:         Hyperdynamic left ventricular function. Left ventricular ejection fraction  75 to 80 %. Aorta:               Mild atheromatous disease arch  Pericardium:    No pericardial effusion  Septum:           No intracardiac shunt via color Doppler. Valves:  Mitral Valve: Structurally normal. posteriorly directed jet of MR. Moderate to severe regurgitation  Aortic Valve: Increased velocity of LVOT and AV flow due to hyperdynamic state of the left  ventricle. Tricuspid valve: Structurally normal. No regurgitation is identified. Pulmonary valve: Normal. No significant regurgitation  No valvular vegetations or thrombus identified. Summary:      1. A DARRIN was performed without complications. 2. LVEF 75-80 %  3. No thrombus or valvular vegetation identified  4. Mitral Valve: Structurally normal. 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 Moderate to severe regurgitation  5. Aortic Valve: Increased velocity of LVOT and AV flow due to hyperdynamic state of the left  ventricle. 6. There were no complications encountered.     Objective:   Vitals: /76   Pulse 66   Temp 98 °F (36.7 °C) (Oral)   Resp 19 Ht 4' 11\" (1.499 m)   Wt 111 lb (50.3 kg)   SpO2 96%   BMI 22.42 kg/m²   General appearance: alert and cooperative with exam  HEENT: Head: Normocephalic, no lesions, without obvious abnormality. Neck:no JVD, trachea midline, no adenopathy  Lungs: Clear to auscultation throughout. Room air without distress. Heart: Irregular rate and rhythm, s1/s2 auscultated, + murmurs  A fib rate controlled. Abdomen: soft, non-tender, bowel sounds active  Extremities: no edema  Neurologic: not done        Assessment / Acute Cardiac Problems:   1. New onset A Fib  2. New onset severe Mitral regurgitation ECHO reviewed by Dr Michelle Benoit  3. HTN    Patient Active Problem List:     Compression fracture of T12 vertebra ([address]     Osteoporosis with pathological fracture     New onset atrial fibrillation (HCC)     Nonrheumatic mitral valve regurgitation     Essential hypertension     Fall     Normocytic normochromic anemia     Depression      Plan of Treatment:   1. DARRIN as noted above. 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 Continue BB and Lasix. Will add Cardizem 30mg PO BID and Amiodarone 200mg PO Daily. .  2. A Fib Rate controlled. Continue BB at lower dose. Will add Cardizem 30mg BID and Amiodarone 200mg Daily. Discussed AC with patient and family and she has a history of previous CVA and is currently on Plavix. Discussed recommendation for [facility]. Will start Coumadin and pharmacy to dose and discontinue Plavix. Will check INR. 3. HTN. Stable. Continue BB. Advised patient and son to monitor BP at home and watch for symptoms of hypotension. 4. Keep K > 4.0 and Mag > 2.0. K 3.7 today. Replacement ordered.   5. Rest of care per Primary Team.      Electronically signed by GOYO Mariee CNP on 8/3/2021 at 8:55 [address] [facility].  [phone]

## 2021-08-03 NOTE — PLAN OF CARE
Problem:  Activity:  Goal: Ability to tolerate increased activity will improve  Description: Ability to tolerate increased activity will improve  8/3/2021 1238 by Harshil Manuel RN  Outcome: Completed  8/3/2021 0559 by Yaz Lott RN  Outcome: Ongoing  Goal: Expression of feelings of increased energy will increase  Description: Expression of feelings of increased energy will increase  8/3/2021 1238 by Harshil Manuel RN  Outcome: Completed  8/3/2021 0559 by aYz Lott RN  Outcome: Ongoing     Problem: Cardiac:  Goal: Ability to maintain an adequate cardiac output will improve  Description: Ability to maintain an adequate cardiac output will improve  8/3/2021 1238 by Harshil Manuel RN  Outcome: Completed  8/3/2021 0559 by Yaz Lott RN  Outcome: Ongoing  Goal: Complications related to the disease process, condition or treatment will be avoided or minimized  Description: Complications related to the disease process, condition or treatment will be avoided or minimized  8/3/2021 1238 by Harshil Manuel RN  Outcome: Completed  8/3/2021 0559 by Yaz Lott RN  Outcome: Ongoing     Problem: Coping:  Goal: Level of anxiety will decrease  Description: Level of anxiety will decrease  8/3/2021 1238 by Harshil Manuel RN  Outcome: Completed  8/3/2021 0559 by Yaz Lott RN  Outcome: Ongoing  Goal: General experience of comfort will improve  Description: General experience of comfort will improve  8/3/2021 1238 by Harshil Manuel RN  Outcome: Completed  8/3/2021 0559 by Yaz Lott RN  Outcome: Ongoing     Problem: Health Behavior:  Goal: Ability to manage health-related needs will improve  Description: Ability to manage health-related needs will improve  8/3/2021 1238 by Harshil Manuel RN  Outcome: Completed  8/3/2021 0559 by Yaz Lott RN  Outcome: Ongoing     Problem: Safety:  Goal: Ability to remain free from injury will improve  Description: Ability to remain free from injury will improve  8/3/2021 1238 by Arpita Field RN  Outcome: Completed  8/3/2021 0559 by Benton Durbin RN  Outcome: Ongoing  Goal: Will show no signs and symptoms of excessive bleeding  Description: Will show no signs and symptoms of excessive bleeding  8/3/2021 1238 by Arpita Field RN  Outcome: Completed  8/3/2021 0559 by Benton Durbin RN  Outcome: Ongoing     Problem: Falls - Risk of:  Goal: Will remain free from falls  Description: Will remain free from falls  8/3/2021 1238 by Arpita Field RN  Outcome: Completed  8/3/2021 0559 by Benton Durbin RN  Outcome: Ongoing  Goal: Absence of physical injury  Description: Absence of physical injury  8/3/2021 1238 by Arpita Field RN  Outcome: Completed  8/3/2021 0559 by Benton Durbin RN  Outcome: Ongoing     Problem: Skin Integrity:  Goal: Will show no infection signs and symptoms  Description: Will show no infection signs and symptoms  8/3/2021 1238 by Arpita Field RN  Outcome: Completed  8/3/2021 0559 by Benton Durbin RN  Outcome: Ongoing  Goal: Absence of new skin breakdown  Description: Absence of new skin breakdown  8/3/2021 1238 by Arpita Field RN  Outcome: Completed  8/3/2021 0559 by Benton Durbin RN  Outcome: Ongoing     Problem: Musculor/Skeletal Functional Status  Goal: Highest potential functional level  8/3/2021 1238 by Arpita Field RN  Outcome: Completed  8/3/2021 0559 by Benton Durbin RN  Outcome: Ongoing  Goal: Absence of falls  8/3/2021 1238 by Arpita Field RN  Outcome: Completed  8/3/2021 0559 by Benton Durbin RN  Outcome: Ongoing

## 2021-08-03 NOTE — DISCHARGE SUMMARY
[facility]  Office: [address], DO, Betzaida Mccrary, DO, Miguel Saavedra, DO, Florian Nair, DO, Jabari Crowley MD, Carmelo Aguilar MD, Apoorva Dean MD, Queenie Santacruz MD, Brenda Norris MD, Jesus Pedraza MD, Madeline Hnids MD, Suri Salinas DO, Suyapa Reilly MD, Yovanny Frank DO, Sen Jeffery MD,  Pierre Garcia DO, Renea Durham MD, Agnes Pina MD, Brock Wick MD, Raul Mcneil MD, Shahla Horton MD, Surendra Urrutia MD, Mary Kay Stauffer MD, Nikita Kovacs, [facility], [facility], CNP, Donta Young, CNP, Lucien Hastings, CNS, Abhay Vazquez, CNP, Claude Leep, CNP, Nathalie Goldman, CNP, Suad Dover, CNP, Shyam Glaser, CNP, Richard Milton PA-C, Kaye Villeda, [facility], Jackie Vargas, CNP, Gem Argueta, CNP, Matt Palencia, CNP, Gokul Franklin, CNP, Nancy Del Rosario, CNP, Marky Edwards, CNP, Jarvis Sneed, [facility], Nithin Gutierrez, [facility]    Discharge Summary     Patient ID: Tyshawn Hoyos  :  1940   MRN: [id number]     ACCOUNT:  [de-identified]   Patient's PCP: Mandy Garrett MD  Admit Date: 2021   Discharge Date: 8/3/2021    Length of Stay: 3  Code Status:  Prior  Admitting Physician: No admitting provider for patient encounter. Discharge Physician: Brock Wick MD     Active Discharge Diagnoses:     Hospital Problem Lists:  Principal Problem:    New onset atrial fibrillation [facility]  Active Problems:    Osteoporosis with pathological fracture    Nonrheumatic mitral valve regurgitation    Essential hypertension    Fall    Normocytic normochromic anemia    Depression  Resolved Problems:    * No resolved hospital problems.  *      Admission Condition:  fair     Discharged Condition: fair    Hospital Stay:     Hospital Course:  Tyshawn Hoyos is a 78 y.o. female who was admitted for the management of   New onset atrial fibrillation [facility]) , presented to ER with after referral from [facility], patient states that she lost her balance while standing. Patient has history of chronic back pain, ambulates without difficulty. Patient was noted to have elevated D-dimer 1.37, CT scan was done which was negative. Initial EKG was concerning for A. fib with RVR. Cardiology was consulted. Patient has known history of severe mitral regurgitation. HUMA was done, report is under. Cardiology added Lopressor, amiodarone, Cardizem for atrial fibrillation. Patient was rate controlled on discharge. Given history of mitral regurgitation decision was made to switch patient to anticoagulation with Coumadin. Plavix was stopped. Cardiology to see patient in office. It was explained in detail to the patient and son that patient has high risk of bleeding in case she falls given she is on Coumadin. Will need bridging with Lovenox, INR between 2.5-3.5. Recommended continuing Lovenox until INR is above 2.5. INR clinic appointment scheduled. Home care was given for change in new medications and safety reasons. MEG signed      Significant therapeutic interventions: huma  A HUMA was performed without complications. 2. LVEF 75-80 %  3. No thrombus or valvular vegetation identified  4. Mitral Valve: Structurally normal. 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 Moderate to severe regurgitation  5. Aortic Valve: Increased velocity of LVOT and AV flow due to hyperdynamic state of the left  ventricle. 6. There were no complications encountered.     Significant Diagnostic Studies:   Labs / Micro:  CBC:   Lab Results   Component Value Date    WBC 6.0 08/03/2021    RBC 3.40 08/03/2021    HGB 10.6 08/03/2021    HCT 33.2 08/03/2021    MCV 97.6 08/03/2021    MCH 31.2 08/03/2021    MCHC 31.9 08/03/2021    RDW 16.1 08/03/2021    PLT See Reflexed IPF Result 08/03/2021     BMP:    Lab Results   Component Value Date    GLUCOSE 88 08/03/2021     08/03/2021    K 3.7 08/03/2021     08/03/2021    CO2 22 08/03/2021    ANIONGAP 12 08/03/2021    BUN 15 08/03/2021    CREATININE 0.50 08/03/2021    BUNCRER NOT REPORTED 08/03/2021    CALCIUM 8.3 08/03/2021    LABGLOM >60 08/03/2021    GFRAA >60 08/03/2021    GFR      08/03/2021    GFR NOT REPORTED 08/03/2021     PT/INR:    Lab Results   Component Value Date    PROTIME 10.6 08/03/2021    INR 1.0 08/03/2021     U/A:    Lab Results   Component Value Date    COLORU NOT REPORTED 07/30/2021    TURBIDITY NOT REPORTED 07/30/2021    SPECGRAV 1.030 07/30/2021    HGBUR NEGATIVE 07/30/2021    PHUR 5.0 07/30/2021    PROTEINU NEGATIVE 07/30/2021    GLUCOSEU NEGATIVE 07/30/2021    KETUA 1+ 07/30/2021    BILIRUBINUR 1+ 07/30/2021    UROBILINOGEN Normal 07/30/2021    NITRU NEGATIVE 07/30/2021    LEUKOCYTESUR TRACE 07/30/2021     TSH:    Lab Results   Component Value Date    TSH 1.88 07/31/2021        Radiology:  XR CHEST (2 VW)    Result Date: 7/30/2021  No acute pulmonary findings. Mild cardiomegaly. Diffuse osseous demineralization with multilevel compression fractures as above which appear grossly similar to prior. XR THORACIC SPINE (2 VIEWS)    Result Date: 7/30/2021  Age indeterminate anterior wedging of T11. XR LUMBAR SPINE (2-3 VIEWS)    Result Date: 7/30/2021  1. Mild superior endplate deformity of L1 appears new in the interval. 2.  Vertebral augmentation at T12, L2 and L4.     CT CHEST PULMONARY EMBOLISM W CONTRAST    Result Date: 7/30/2021  No evidence for infiltrates or effusions. No central filling defects are noted in the pulmonary arterial tree however there is excessive artifact which may be secondary to respiratory motion which limits evaluation of the pulmonary arteries beyond the tertiary branching point. Possible of distal pulmonary emboli cannot be excluded on this study.  No evidence for right ventricular strain       Consultations:    Consults:     Final Specialist Recommendations/Findings:   IP CONSULT TO CARDIOLOGY  IP CONSULT TO HOME CARE NEEDS  IP CONSULT TO PHARMACY  PHARMACY TO DOSE WARFARIN      The patient was seen and examined on day of discharge and this discharge summary is in conjunction with any daily progress note from day of discharge. Discharge plan:     Disposition: Home    Physician Follow Up:     Carlie Markham MD  [address]  [address] [address]    Go on 8/5/2021  post hospital follow up @115 call office when home    Nava Dietz  [phone]    On 8/16/2021  9:15am for hospital follow up.        Requiring Further Evaluation/Follow Up POST HOSPITALIZATION/Incidental Findings: INR check    Diet: cardiac diet    Activity: As tolerated    Instructions to Patient: stop plavix  Continue to take lovenox twice a day and coumadin till INR is more than 2.5    Discharge Medications:      Medication List      START taking these medications    amiodarone 200 MG tablet  Commonly known as: CORDARONE  Take 1 tablet by mouth daily     dilTIAZem 30 MG tablet  Commonly known as: CARDIZEM  Take 1 tablet by mouth 2 times daily     enoxaparin 60 MG/0.6ML injection  Commonly known as: LOVENOX  Inject 0.5 mLs into the skin every 12 hours for 6 days     furosemide 20 MG tablet  Commonly known as: LASIX  Take 1 tablet by mouth daily  Start taking on: August 4, 2021     metoprolol tartrate 25 MG tablet  Commonly known as: LOPRESSOR  Take 0.5 tablets by mouth 2 times daily     warfarin 2 MG tablet  Commonly known as: COUMADIN  Take 1 tablet by mouth daily Follow up with INR in 3 days        CONTINUE taking these medications    alendronate 70 MG tablet  Commonly known as: FOSAMAX     b complex vitamins capsule     citalopram 20 MG tablet  Commonly known as: CELEXA     gabapentin 100 MG capsule  Commonly known as: NEURONTIN     Gaviscon 80-14.2 MG Chew  Generic drug: Alum Hydroxide-Mag Trisilicate     sodium chloride 5 % ophthalmic solution  Commonly known as:      traMADol 50 MG tablet  Commonly known as: ULTRAM     Tylenol 8 Hour Arthritis Pain 650 MG extended release tablet  Generic drug: acetaminophen     Tylenol PM Extra Strength  MG tablet  Generic drug: diphenhydrAMINE-APAP (sleep)     Valerian Root 100 MG Caps     vitamin B-6 50 MG tablet  Commonly known as: PYRIDOXINE     vitamin D 25 MCG (1000 UT) Tabs tablet  Commonly known as: CHOLECALCIFEROL        STOP taking these medications    Plavix 75 MG tablet  Generic drug: clopidogrel           Where to Get Your Medications      These medications were sent to [address] [place] [address] [id number] - [place], [address] - F [phone]  [facility] JESSE [facility]ER, [facility] [address]    Phone: [phone]   · amiodarone 200 MG tablet  · dilTIAZem 30 MG tablet  · enoxaparin 60 MG/0.6ML injection  · furosemide 20 MG tablet  · metoprolol tartrate 25 MG tablet  · warfarin 2 MG tablet         Discharge Procedure Orders   Protime-INR   Standing Status: Future Standing Exp. Date: 08/08/22     Order Specific Question Answer Comments   Daily Coumadin Dose? 2mg        Time Spent on discharge is  40 mins in patient examination, evaluation, counseling as well as medication reconciliation, prescriptions for required medications, discharge plan and follow up. Electronically signed by   Collin Patel MD  8/3/2021  5:00 PM      Thank you Dr. Christine Prado MD for the opportunity to be involved in this patient's care.

## 2021-08-03 NOTE — FLOWSHEET NOTE
IV removed, instructions provided and 1st dose of warfarin given. Pt & family encouraged to call with questions.  Pt left floor via wheelchair with CAR2 Aide

## 2021-08-03 NOTE — TELEPHONE ENCOUNTER
Mel Feng from Anticoagulation clinic came to the department stating that the patient is being discharged soon from [facility] and he received a written referral for patient to see the anticoagulation clinic here in [facility] but he would like an electronic referral placed in system for patient to see the anticoagulation clinic. Attached to encounter is the written referral order. Pt is scheduled for appointment with Dr Allan Salazar on 8/16/21.      Last Appt:  Visit date not found  Next Appt:   8/16/2021  Med verified in [address] [place] [address] Rd

## 2021-08-06 ENCOUNTER — HOSPITAL ENCOUNTER (OUTPATIENT)
Dept: PHARMACY | Age: 81
Setting detail: THERAPIES SERIES
Discharge: HOME | End: 2021-08-06
Payer: MEDICARE

## 2021-08-06 LAB
INR BLD: 1.3
PROTIME: 15.1 SECONDS

## 2021-08-06 PROCEDURE — 85610 PROTHROMBIN TIME: CPT

## 2021-08-06 PROCEDURE — 36416 COLLJ CAPILLARY BLOOD SPEC: CPT

## 2021-08-06 PROCEDURE — 99202 OFFICE O/P NEW SF 15 MIN: CPT

## 2021-08-06 NOTE — PATIENT INSTRUCTIONS
\"On day of next appointment, please screen for temperature and COVID-19 symptoms prior to you clinic appointment. If any symptoms present, please call [phone] to reschedule. \"

## 2021-08-06 NOTE — PROGRESS NOTES
ANTICOAGULATION SERVICE    Date of Clinic Visit:  8/6/2021    Dragan Figueredo is a 78 y.o. female who presents to clinic today for anticoagulation monitoring and adjustment. Recent INR Results:  Internal QC passed  Lab Results   Component Value Date    INR 1.3 08/06/2021    INR 1.0 08/03/2021       Current Warfarin Dosage:  Dosing Plan  As of 8/6/2021    TTR:     Full warfarin instructions:  8/6: 6 mg; 8/7: 4 mg; Otherwise 2 mg every day               Assessment/Plan:    Modify warfarin dose as noted above: Patient is a new start on warfarin and is bridging with lovenox. We will increase dose a bit, continue with lovenox and re-check INR on Monday. Next Clinic Appointment:  Return date  As of 8/6/2021    TTR:     Next INR check:  8/9/2021             Please call [facility] at [phone] with any questions. Thanks!   Sherri Forbes, [facility]  Anticoagulation Service Pharmacist  8/6/2021 12:13 PM  For Pharmacy Admin Tracking Only     Intervention Detail: Dose Adjustment: 1, reason: Therapy Optimization   Total # of Interventions Recommended: 1   Total # of Interventions Accepted: 1   Time Spent (min): 30

## 2021-08-09 ENCOUNTER — HOSPITAL ENCOUNTER (OUTPATIENT)
Dept: PHARMACY | Age: 81
Setting detail: THERAPIES SERIES
Discharge: HOME | End: 2021-08-09
Payer: MEDICARE

## 2021-08-09 DIAGNOSIS — I34.0 NONRHEUMATIC MITRAL VALVE REGURGITATION: ICD-10-CM

## 2021-08-09 DIAGNOSIS — I48.91 NEW ONSET ATRIAL FIBRILLATION (HCC): Primary | ICD-10-CM

## 2021-08-09 LAB
INR BLD: 1.7
PROTIME: 19.9 SECONDS

## 2021-08-09 PROCEDURE — 99211 OFF/OP EST MAY X REQ PHY/QHP: CPT

## 2021-08-09 PROCEDURE — 85610 PROTHROMBIN TIME: CPT

## 2021-08-09 PROCEDURE — 36416 COLLJ CAPILLARY BLOOD SPEC: CPT

## 2021-08-09 NOTE — PATIENT INSTRUCTIONS
\"On day of next appointment, please screen for temperature and COVID-19 symptoms prior to you clinic appointment. If any symptoms present, please call [phone] to reschedule. \"

## 2021-08-09 NOTE — PROGRESS NOTES
ANTICOAGULATION SERVICE    Date of Clinic Visit:  8/9/2021    Jessica Fitch is a 78 y.o. female who presents to clinic today for anticoagulation monitoring and adjustment. Recent INR Results:  Internal QC passed  Lab Results   Component Value Date    INR 1.7 08/09/2021    INR 1.3 08/06/2021       Current Warfarin Dosage:  Dosing Plan  As of 8/9/2021    TTR:     Full warfarin instructions:  8/9: 6 mg; 8/10: 4 mg; 8/11: 4 mg; 8/12: 4 mg; Otherwise 2 mg every day               Assessment/Plan:    Modify warfarin dose as noted above: Patient is not quite in target range. Will continue with last 2 doses of lovenox. Increase dose a bit and re-check Thursday 8/12/21. Next Clinic Appointment:  Return date  As of 8/9/2021    TTR:     Next INR check:  8/12/2021             Please call [facility]agulation [facility] at [phone] with any questions. Thanks!   KAVYA Cowan [facility]  Anticoagulation Service Pharmacist  8/9/2021 11:00 AM  For Pharmacy Admin Tracking Only     Intervention Detail: Dose Adjustment: 1, reason: Therapy Optimization   Total # of Interventions Recommended: 1   Total # of Interventions Accepted: 1   Time Spent (min): 20

## 2021-08-12 ENCOUNTER — HOSPITAL ENCOUNTER (OUTPATIENT)
Dept: PHARMACY | Age: 81
Setting detail: THERAPIES SERIES
Discharge: HOME | End: 2021-08-12
Payer: MEDICARE

## 2021-08-12 DIAGNOSIS — I34.0 NONRHEUMATIC MITRAL VALVE REGURGITATION: ICD-10-CM

## 2021-08-12 DIAGNOSIS — I48.91 NEW ONSET ATRIAL FIBRILLATION (HCC): Primary | ICD-10-CM

## 2021-08-12 LAB
INR BLD: 2.1
PROTIME: 25.3 SECONDS

## 2021-08-12 PROCEDURE — 36416 COLLJ CAPILLARY BLOOD SPEC: CPT

## 2021-08-12 PROCEDURE — 99211 OFF/OP EST MAY X REQ PHY/QHP: CPT

## 2021-08-12 PROCEDURE — 85610 PROTHROMBIN TIME: CPT

## 2021-08-16 ENCOUNTER — OFFICE VISIT (OUTPATIENT)
Dept: CARDIOLOGY | Age: 81
End: 2021-08-16
Payer: MEDICARE

## 2021-08-16 ENCOUNTER — TELEPHONE (OUTPATIENT)
Dept: PHARMACY | Age: 81
End: 2021-08-16

## 2021-08-16 ENCOUNTER — HOSPITAL ENCOUNTER (OUTPATIENT)
Dept: PHARMACY | Age: 81
Setting detail: THERAPIES SERIES
Discharge: HOME | End: 2021-08-16
Payer: MEDICARE

## 2021-08-16 VITALS
HEIGHT: 59 IN | WEIGHT: 111 LBS | BODY MASS INDEX: 22.38 KG/M2 | DIASTOLIC BLOOD PRESSURE: 62 MMHG | SYSTOLIC BLOOD PRESSURE: 108 MMHG | HEART RATE: 52 BPM

## 2021-08-16 DIAGNOSIS — I34.0 NONRHEUMATIC MITRAL VALVE REGURGITATION: ICD-10-CM

## 2021-08-16 DIAGNOSIS — I10 ESSENTIAL HYPERTENSION: ICD-10-CM

## 2021-08-16 DIAGNOSIS — I48.91 NEW ONSET ATRIAL FIBRILLATION (HCC): Primary | ICD-10-CM

## 2021-08-16 DIAGNOSIS — I63.9 CEREBROVASCULAR ACCIDENT (CVA), UNSPECIFIED MECHANISM (HCC): ICD-10-CM

## 2021-08-16 DIAGNOSIS — I42.2 HYPERTROPHIC CARDIOMYOPATHY (HCC): ICD-10-CM

## 2021-08-16 LAB
INR BLD: 2.8
PROTIME: 33.1 SECONDS

## 2021-08-16 PROCEDURE — G8427 DOCREV CUR MEDS BY ELIG CLIN: HCPCS | Performed by: INTERNAL MEDICINE

## 2021-08-16 PROCEDURE — 99211 OFF/OP EST MAY X REQ PHY/QHP: CPT

## 2021-08-16 PROCEDURE — 1090F PRES/ABSN URINE INCON ASSESS: CPT | Performed by: INTERNAL MEDICINE

## 2021-08-16 PROCEDURE — 4040F PNEUMOC VAC/ADMIN/RCVD: CPT | Performed by: INTERNAL MEDICINE

## 2021-08-16 PROCEDURE — 93005 ELECTROCARDIOGRAM TRACING: CPT | Performed by: INTERNAL MEDICINE

## 2021-08-16 PROCEDURE — 93010 ELECTROCARDIOGRAM REPORT: CPT | Performed by: INTERNAL MEDICINE

## 2021-08-16 PROCEDURE — 85610 PROTHROMBIN TIME: CPT

## 2021-08-16 PROCEDURE — 1123F ACP DISCUSS/DSCN MKR DOCD: CPT | Performed by: INTERNAL MEDICINE

## 2021-08-16 PROCEDURE — 99214 OFFICE O/P EST MOD 30 MIN: CPT | Performed by: INTERNAL MEDICINE

## 2021-08-16 PROCEDURE — 36416 COLLJ CAPILLARY BLOOD SPEC: CPT

## 2021-08-16 PROCEDURE — G8420 CALC BMI NORM PARAMETERS: HCPCS | Performed by: INTERNAL MEDICINE

## 2021-08-16 PROCEDURE — 1036F TOBACCO NON-USER: CPT | Performed by: INTERNAL MEDICINE

## 2021-08-16 PROCEDURE — G8400 PT W/DXA NO RESULTS DOC: HCPCS | Performed by: INTERNAL MEDICINE

## 2021-08-16 PROCEDURE — 1111F DSCHRG MED/CURRENT MED MERGE: CPT | Performed by: INTERNAL MEDICINE

## 2021-08-16 RX ORDER — FUROSEMIDE 20 MG/1
20 TABLET ORAL DAILY PRN
Qty: 90 TABLET | Refills: 1 | Status: SHIPPED | OUTPATIENT
Start: 2021-08-16

## 2021-08-16 NOTE — PROGRESS NOTES
Cardiology Consultation/Follow Up. [facility]    Binu Eaton  1940  [id number]    Today: 8/16/21    CC: Patient is here for follow up post hospitalization for Afib, MR, HOCM.     HPI:   Binu Eaton follow-up post hospitalization. Was in the ER due to new onset atrial fibrillation. Underwent echocardiogram while in the ER which showed severe mitral regurgitation, hocm, hyperdynamic LVEF. Was then transferred to [facility] for further work-up. She denies complaints of chest pain, orthopnea, PND, lower extremity edema. She underwent a DARRIN which showed moderate-severe MR, Peewee, minimal LVOT obstruction. Known to us from hospitalization at [facility]. Vs for :  1. New onset A Fib  2. New onset severe Mitral regurgitation ECHO reviewed by Dr Lilliana Simpson  3. HTN  Plan:  · DARRIN as noted above. Severe MR most likely due to HOCM/PEEWEE. Continue BB and Lasix. Will add Cardizem 30mg PO BID and Amiodarone 200mg PO Daily. .  · A Fib Rate controlled. Continue BB at lower dose. Will add Cardizem 30mg BID and Amiodarone 200mg Daily. Discussed AC with patient and family and she has a history of previous CVA and is currently on Plavix. Discussed recommendation for [facility]. Will start Coumadin and pharmacy to dose and discontinue Plavix. Will check INR.  · HTN. Stable. Continue BB. Advised patient and son to monitor BP at home and watch for symptoms of hypotension. · Keep K > 4.0 and Mag > 2.0. K 3.7 today. Replacement ordered.   · Rest of care per Primary Team    Past Medical:  Past Medical History:   Diagnosis Date    Chronic back pain     Neuropathy     Stroke ([address]     x2 in 2009 and x1 in 2010         Past Surgical:  Past Surgical History:   Procedure Laterality Date    ABDOMEN SURGERY  1995    bowel resection for diverticulosis    APPENDECTOMY      FIXATION KYPHOPLASTY  7/3/14    T 12    HYSTERECTOMY      SPINE SURGERY N/A 5/10/2021    L2 ET L4 KYPHOPLASTY performed by Kari Davila MD at [address] [place] [address] History:  No family history on file. Social History:  Social History     Socioeconomic History    Marital status:      Spouse name: None    Number of children: None    Years of education: None    Highest education level: None   Occupational History    None   Tobacco Use    Smoking status: Never Smoker    Smokeless tobacco: Never Used   Substance and Sexual Activity    Alcohol use: No    Drug use: No    Sexual activity: None   Other Topics Concern    None   Social History Narrative    None     Social Determinants of Health     Financial Resource Strain:     Difficulty of Paying Living Expenses:    Food Insecurity:     Worried About Running Out of Food in the Last Year:     Ran Out of Food in the Last Year:    Transportation Needs:     Lack of Transportation (Medical):  Lack of Transportation (Non-Medical):    Physical Activity:     Days of Exercise per Week:     Minutes of Exercise per Session:    Stress:     Feeling of Stress :    Social Connections:     Frequency of Communication with Friends and Family:     Frequency of Social Gatherings with Friends and Family:     Attends Nondenominational Services:     Active Member of Clubs or Organizations:     Attends Club or Organization Meetings:     Marital Status:    Intimate Partner Violence:     Fear of Current or Ex-Partner:     Emotionally Abused:     Physically Abused:     Sexually Abused:        REVIEW OF SYSTEMS:    · Constitutional: there has been no unanticipated weight loss. There's been No change in energy level, No change in activity level. · Eyes: No visual changes or diplopia. No scleral icterus. · ENT: No Headaches, hearing loss or vertigo. No mouth sores or sore throat. · Cardiovascular: AS HPI  · Respiratory: AS HPI  · Gastrointestinal: No abdominal pain, appetite loss, blood in stools. No change in bowel or bladder habits. · Genitourinary: No dysuria, trouble voiding, or hematuria.   · Musculoskeletal:  No gait disturbance, No weakness or joint complaints. · Integumentary: No rash or pruritis. · Neurological: No headache, diplopia, change in muscle strength, numbness or tingling. No change in gait, balance, coordination, mood, affect, memory, mentation, behavior. · Psychiatric: No new anxiety or depression. · Endocrine: No temperature intolerance. No excessive thirst, fluid intake, or urination. No tremor. · Hematologic/Lymphatic: No abnormal bruising or bleeding, blood clots or swollen lymph nodes. · Allergic/Immunologic: No nasal congestion or hives. Medications:  Outpatient Medications Marked as Taking for the 8/16/21 encounter (Office Visit) with Jasmyne Menezes DO   Medication Sig Dispense Refill    amiodarone (CORDARONE) 200 MG tablet Take 1 tablet by mouth daily 60 tablet 1    metoprolol tartrate (LOPRESSOR) 25 MG tablet Take 0.5 tablets by mouth 2 times daily 60 tablet 3    dilTIAZem (CARDIZEM) 30 MG tablet Take 1 tablet by mouth 2 times daily 120 tablet 3    furosemide (LASIX) 20 MG tablet Take 1 tablet by mouth daily 60 tablet 3    warfarin (COUMADIN) 2 MG tablet Take 1 tablet by mouth daily Follow up with INR in 3 days 30 tablet 3    alendronate (FOSAMAX) 70 MG tablet Take 70 mg by mouth once a week      diphenhydrAMINE-APAP, sleep, (TYLENOL PM EXTRA STRENGTH)  MG tablet Take 1 tablet by mouth nightly as needed for Sleep      gabapentin (NEURONTIN) 100 MG capsule Take 2 capsules by mouth 3 times daily.  sodium chloride () 5 % ophthalmic solution Apply 1 drop to eye 3 times daily      traMADol (ULTRAM) 50 MG tablet Take 1 tablet by mouth as needed.  acetaminophen (TYLENOL 8 HOUR ARTHRITIS PAIN) 650 MG extended release tablet Take 650 mg by mouth every 8 hours as needed for Pain      citalopram (CELEXA) 20 MG tablet Take 20 mg by mouth daily.  b complex vitamins capsule Take 1 capsule by mouth daily.       Pyridoxine HCl (VITAMIN B-6) 50 MG tablet Take 50 mg by mouth daily.      vitamin D3 (CHOLECALCIFEROL) 1000 UNITS TABS tablet Take 1,000 Units by mouth 2 times daily.  Valerian Root 100 MG CAPS Take  by mouth nightly.  Alum Hydroxide-Mag Trisilicate (GAVISCON) 22-48.0 MG CHEW Take by mouth as needed           Physical Exam:   Vitals: /62   Pulse 52   Ht 4' 11\" (1.499 m)   Wt 111 lb (50.3 kg)   BMI 22.42 kg/m²   General appearance: alert and cooperative with exam  HEENT: Head: Normocephalic, no lesions, without obvious abnormality. Neck: no carotid bruit, no JVD  Lungs: clear to auscultation bilaterally  Heart:  regular rate and rhythm, S1, S2 normal, no sys Murmur  Abdomen: soft, non-tender; bowel sounds normal; no masses,  no organomegaly  Extremities: no site injection hematoma, extremities normal, atraumatic, no cyanosis. no edema  Neurologic: Mental status: Alert, oriented, thought content appropriate    Labs:  No results found for: CHOL, TRIG, HDL, LDLCHOLESTEROL, LDLCALC, LABVLDL, VLDL, CHOLHDLRATIO    Lab Results   Component Value Date     08/03/2021    K 3.7 08/03/2021     08/03/2021    CO2 22 08/03/2021    BUN 15 08/03/2021    CREATININE 0.50 08/03/2021    GLUCOSE 88 08/03/2021    CALCIUM 8.3 (L) 08/03/2021    PROT 5.8 (L) 07/31/2021    LABALBU 3.4 (L) 07/31/2021    BILITOT 1.27 (H) 07/31/2021    ALKPHOS 62 07/31/2021    AST 16 07/31/2021    ALT 8 07/31/2021    LABGLOM >60 08/03/2021    GFRAA >60 08/03/2021       EKG: Most likely Sinus Bradycardia - HR 52. ECHO:  Results for orders placed or performed during the hospital encounter of 07/30/21   ECHO Complete 2D W Doppler W Color  Normal left ventricular diameter. Moderate left ventricular hypertrophy. Hyperdynamic left ventricular function. Left ventricular ejection fraction 75 to 80 %. Left atrium is mildly dilated. Right atrium is mildly dilated . Aortic valve sclerosis without stenosis.   Increased velocity of LVOT and AV flow due to hyperdynamic state of the left ventricle. Extensive mitral annular calcification. Thickened mitral valve leaflets. Mild mitral stenosis. Mean gradient is 5 mm Hg. Severe mitral regurgitation, eccentric, posteriorly directed, with coanda effect. Mild to moderate tricuspid regurgitation. Estimated right ventricular systolic pressure is 40 mmHg. Mild pulmonary hypertension. IVC Increased diameter, but still has inspiratory variation suggesting upper normal or mildly elevated RA filling pressure (i.e. CVP) . DARRIN on 8/21:  1. A DARRIN was performed without complications. 2. LVEF 75-80 %  3. No thrombus or valvular vegetation identified  4. Mitral Valve: Structurally normal. 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 Moderate to severe regurgitation  5. Aortic Valve: Increased velocity of LVOT and AV flow due to hyperdynamic state of the left  Ventricle. Past Medical and Surgical History, Problem List, Allergies, Medications, Labs, Imaging, all reviewed extensively in EMR and with the patient. Assessment:  - New onset Parox AF- back in NSR with sinus bradycardia  - HOCM, Mod/Severe MR- on DARRIN  - Hyperdynamic LVEF  - HTN  - H/o CVA in past    Plan:  - stop cardizem due to bradycardia  - continue BB, Amio  - continue coumadin  - change lasix to use only as needed for swelling/weight gain/SOB  - repeat Echo in 6 months re eval MR/HOCM. The patient is to continue heart healthy diet, weight loss and exercise as tolerated. Patient's medications and side effects were discussed. Medication refills were provided if needed. Follow up appointment timing was discussed. All questions and concerns were addressed to patient's satisfaction. The patient is to follow up in 6 months or sooner if necessary. Thank you for allowing me to participate in the care of this patient, please do not hesitate to call if you have any questions. Ziggy Badillo DO, [facility] [place], [address], [address], [place] [address] [facility]  [facility] [employer]  [phone]

## 2021-08-16 NOTE — PROGRESS NOTES
ANTICOAGULATION SERVICE    Date of Clinic Visit:  8/16/2021    Stephania Jimenez is a 78 y.o. female who presents to clinic today for anticoagulation monitoring and adjustment. Recent INR Results:  Internal QC passed  Lab Results   Component Value Date    INR 2.8 08/16/2021    INR 2.1 08/12/2021       Current Warfarin Dosage:  Dosing Plan  As of 8/16/2021    TTR:     Full warfarin instructions:  2 mg every Mon, Fri; 4 mg all other days               Assessment/Plan:    Modify warfarin dose as noted above: INR remains in range today but has increased closer to top of range. I will decrease weekly dose at this time by 14% and recheck in 4 days. Next Clinic Appointment:  Return date  As of 8/16/2021    TTR:     Next INR check:  8/20/2021             Please call [facility] at [phone] with any questions. Thanks!   Ion Kay, [facility]  Anticoagulation Service Pharmacist  8/16/2021 10:04 AM     For Pharmacy Admin Tracking Only     Intervention Detail: Dose Adjustment: 1, reason: Therapy Optimization   Total # of Interventions Recommended: 1   Total # of Interventions Accepted: 1   Time Spent (min): 15

## 2021-08-16 NOTE — TELEPHONE ENCOUNTER
For Pharmacy Admin Tracking Only     Intervention Detail: Refill(s) Provided   Total # of Interventions Recommended: 1   Total # of Interventions Accepted: 1   Time Spent (min): 10

## 2021-08-20 ENCOUNTER — HOSPITAL ENCOUNTER (OUTPATIENT)
Dept: PHARMACY | Age: 81
Setting detail: THERAPIES SERIES
Discharge: HOME | End: 2021-08-20
Payer: MEDICARE

## 2021-08-20 DIAGNOSIS — I48.91 NEW ONSET ATRIAL FIBRILLATION (HCC): Primary | ICD-10-CM

## 2021-08-20 DIAGNOSIS — I34.0 NONRHEUMATIC MITRAL VALVE REGURGITATION: ICD-10-CM

## 2021-08-20 LAB
INR BLD: 2.1
PROTIME: 25.5 SECONDS

## 2021-08-20 PROCEDURE — 36416 COLLJ CAPILLARY BLOOD SPEC: CPT

## 2021-08-20 PROCEDURE — 85610 PROTHROMBIN TIME: CPT

## 2021-08-20 PROCEDURE — 99211 OFF/OP EST MAY X REQ PHY/QHP: CPT

## 2021-08-20 NOTE — PROGRESS NOTES
ANTICOAGULATION SERVICE    Date of Clinic Visit:  8/20/2021    Eliud Rowe is a 78 y.o. female who presents to clinic today for anticoagulation monitoring and adjustment. Recent INR Results:  Internal QC passed  Lab Results   Component Value Date    INR 2.1 08/20/2021    INR 2.8 08/16/2021       Current Warfarin Dosage:  Dosing Plan  As of 8/20/2021    TTR:  100.0 % (4 d)   Full warfarin instructions:  2 mg every Mon, Fri; 4 mg all other days               Assessment/Plan:    Continue current regimen as INR remains stable. Patient has been in range for last 3 visits will continue with current plan and re-check 1 week. Next Clinic Appointment:  Return date  As of 8/20/2021    TTR:  100.0 % (4 d)   Next INR check:  8/27/2021             Please call [address] [facility] at [phone] with any questions. Thanks!   Ana Garay, [facility]  Anticoagulation Service Pharmacist  8/20/2021 10:22 AM  For Pharmacy Admin Tracking Only     Total # of Interventions Recommended: 0   Total # of Interventions Accepted: 0   Time Spent (min): 10

## 2021-08-20 NOTE — PATIENT INSTRUCTIONS
\"On day of next appointment, please screen for temperature and COVID-19 symptoms prior to you clinic appointment. If any symptoms present, please call [phone] to reschedule. \"

## 2021-08-27 ENCOUNTER — HOSPITAL ENCOUNTER (OUTPATIENT)
Dept: PHARMACY | Age: 81
Setting detail: THERAPIES SERIES
Discharge: HOME | End: 2021-08-27
Payer: MEDICARE

## 2021-08-27 DIAGNOSIS — I34.0 NONRHEUMATIC MITRAL VALVE REGURGITATION: ICD-10-CM

## 2021-08-27 DIAGNOSIS — I48.91 NEW ONSET ATRIAL FIBRILLATION (HCC): Primary | ICD-10-CM

## 2021-08-27 LAB
INR BLD: 2.4
PROTIME: 29 SECONDS

## 2021-08-27 PROCEDURE — 85610 PROTHROMBIN TIME: CPT

## 2021-08-27 PROCEDURE — 99211 OFF/OP EST MAY X REQ PHY/QHP: CPT

## 2021-08-27 PROCEDURE — 36416 COLLJ CAPILLARY BLOOD SPEC: CPT

## 2021-08-27 NOTE — PROGRESS NOTES
ANTICOAGULATION SERVICE    Date of Clinic Visit:  2021    Paola Jordan is a 78 y.o. female who presents to clinic today for anticoagulation monitoring and adjustment. Recent INR Results:  Internal QC passed  Lab Results   Component Value Date    INR 2.4 2021    INR 2.1 2021       Current Warfarin Dosage:  Dosing Plan  As of 2021    TTR:  100.0 % (1.6 wk)   Full warfarin instructions:  2 mg every Mon, Fri; 4 mg all other days               Assessment/Plan:    Continue current regimen as INR remains stable. INR remains in range today. I will extend next appointment out to 2 weeks. May need to decrease dose at next appointment due to starting amiodarone the same time as she started warfarin. Next Clinic Appointment:  Return date  As of 2021    TTR:  100.0 % (1.6 wk)   Next INR check:  9/10/2021             Please call [facility] Anticoagulation Clinic at [phone] with any questions. Thanks!   Tonio Haas, [facility]  Anticoagulation Service Pharmacist  2021 10:29 AM     For Pharmacy Admin Tracking Only     Intervention Detail: Adherence Monitorin   Total # of Interventions Recommended: 0   Total # of Interventions Accepted: 0   Time Spent (min): 15
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
## 2021-09-10 ENCOUNTER — HOSPITAL ENCOUNTER (OUTPATIENT)
Dept: PHARMACY | Age: 81
Setting detail: THERAPIES SERIES
Discharge: HOME | End: 2021-09-10
Payer: MEDICARE

## 2021-09-10 DIAGNOSIS — I34.0 NONRHEUMATIC MITRAL VALVE REGURGITATION: ICD-10-CM

## 2021-09-10 DIAGNOSIS — I48.91 NEW ONSET ATRIAL FIBRILLATION (HCC): Primary | ICD-10-CM

## 2021-09-10 LAB
INR BLD: 2
PROTIME: 23.9 SECONDS

## 2021-09-10 PROCEDURE — 99211 OFF/OP EST MAY X REQ PHY/QHP: CPT

## 2021-09-10 PROCEDURE — 36416 COLLJ CAPILLARY BLOOD SPEC: CPT

## 2021-09-10 PROCEDURE — 85610 PROTHROMBIN TIME: CPT

## 2021-09-10 NOTE — PROGRESS NOTES
ANTICOAGULATION SERVICE    Date of Clinic Visit:  9/10/2021    Susan Cisneros is a 78 y.o. female who presents to clinic today for anticoagulation monitoring and adjustment. Recent INR Results:  Internal QC passed  Lab Results   Component Value Date    INR 2 09/10/2021    INR 2.4 2021       Current Warfarin Dosage:  Dosing Plan  As of 9/10/2021    TTR:  100.0 % (3.6 wk)   Full warfarin instructions:  9/10: 4 mg; Otherwise 2 mg every Mon, Fri; 4 mg all other days                 Assessment/Plan:    Modify warfarin dose as noted above: INR is at bottom of range today and decreased from last visit. Still adjusting dose and trying account for starting amiodarone. I will give extra 2 mg today then back on same dose. Next Clinic Appointment:  Return date  As of 9/10/2021    TTR:  100.0 % (3.6 wk)   Next INR check:  2021             Please call [facility] Anticoagulation Clinic at [phone] with any questions. Thanks!   Tamia Estevez [facility]  Anticoagulation Service Pharmacist  9/10/2021 11:03 AM     For Pharmacy Admin Tracking Only     Intervention Detail: Adherence Monitorin   Total # of Interventions Recommended: 0   Total # of Interventions Accepted: 0   Time Spent (min): 15

## 2021-09-23 ENCOUNTER — HOSPITAL ENCOUNTER (OUTPATIENT)
Dept: PHARMACY | Age: 81
Setting detail: THERAPIES SERIES
Discharge: HOME | End: 2021-09-23
Payer: MEDICARE

## 2021-09-23 DIAGNOSIS — I48.91 NEW ONSET ATRIAL FIBRILLATION (HCC): Primary | ICD-10-CM

## 2021-09-23 DIAGNOSIS — I34.0 NONRHEUMATIC MITRAL VALVE REGURGITATION: ICD-10-CM

## 2021-09-23 LAB
INR BLD: 3.9
PROTIME: 47.4 SECONDS

## 2021-09-23 PROCEDURE — 99211 OFF/OP EST MAY X REQ PHY/QHP: CPT

## 2021-09-23 PROCEDURE — 85610 PROTHROMBIN TIME: CPT

## 2021-09-23 PROCEDURE — 36416 COLLJ CAPILLARY BLOOD SPEC: CPT

## 2021-09-23 NOTE — PROGRESS NOTES
ANTICOAGULATION SERVICE    Date of Clinic Visit:  9/23/2021    Dilshad King is a 78 y.o. female who presents to clinic today for anticoagulation monitoring and adjustment. Recent INR Results:  Internal QC passed  Lab Results   Component Value Date    INR 3.9 09/23/2021    INR 2 09/10/2021       Current Warfarin Dosage:  Dosing Plan  As of 9/23/2021    TTR:  84.4 % (1.3 mo)   Full warfarin instructions:  9/23: Hold; Otherwise 2 mg every Mon, Fri; 4 mg all other days               Assessment/Plan:    Modify warfarin dose as noted above:  Patient is well above target will hold 1 day d/t fall risk as patient lives alone. Will restart then at home dose and re-check 2 weeks. No explanation of increase. Next Clinic Appointment:  Return date  As of 9/23/2021    TTR:  84.4 % (1.3 mo)   Next INR check:  10/7/2021             Please call [facility]ticoagulation Clinic at [phone] with any questions. Thanks!   Augie Nissen, [facility]  Anticoagulation Service Pharmacist  9/23/2021 10:31 AM  For Pharmacy Admin Tracking Only     Intervention Detail: Dose Adjustment: 1, reason: Therapy De-escalation   Total # of Interventions Recommended: 1   Total # of Interventions Accepted: 1   Time Spent (min): 15

## 2021-09-23 NOTE — PATIENT INSTRUCTIONS
\"On day of next appointment, please screen for temperature and COVID-19 symptoms prior to you clinic appointment. If any symptoms present, please call [phone] to reschedule. \"

## 2021-10-07 ENCOUNTER — HOSPITAL ENCOUNTER (OUTPATIENT)
Dept: PHARMACY | Age: 81
Setting detail: THERAPIES SERIES
Discharge: HOME | End: 2021-10-07
Payer: MEDICARE

## 2021-10-07 DIAGNOSIS — I48.91 NEW ONSET ATRIAL FIBRILLATION (HCC): Primary | ICD-10-CM

## 2021-10-07 DIAGNOSIS — I34.0 NONRHEUMATIC MITRAL VALVE REGURGITATION: ICD-10-CM

## 2021-10-07 LAB
INR BLD: 2.7
PROTIME: 32.9 SECONDS

## 2021-10-07 PROCEDURE — 36416 COLLJ CAPILLARY BLOOD SPEC: CPT

## 2021-10-07 PROCEDURE — 85610 PROTHROMBIN TIME: CPT

## 2021-10-07 PROCEDURE — 99211 OFF/OP EST MAY X REQ PHY/QHP: CPT

## 2021-10-07 NOTE — PROGRESS NOTES
ANTICOAGULATION SERVICE    Date of Clinic Visit:  10/7/2021    Kimberly Cruz is a 78 y.o. female who presents to clinic today for anticoagulation monitoring and adjustment. Recent INR Results:  Internal QC passed  Lab Results   Component Value Date    INR 2.7 10/07/2021    INR 3.9 09/23/2021       Current Warfarin Dosage:  Dosing Plan  As of 10/7/2021    TTR:  68.4 % (1.7 mo)   Full warfarin instructions:  2 mg every Mon, Fri; 4 mg all other days               Assessment/Plan:    Continue current regimen as INR remains stable. Patient nicely back into range will return to previous dose regimen and re-check as normal.  If patient trends at higher end of range consider adding another 2 mg day to dosing regimen. Next Clinic Appointment:  Return date  As of 10/7/2021    TTR:  68.4 % (1.7 mo)   Next INR check:  11/4/2021             Please call [facility]ticoagulation Clinic at [phone] with any questions. Thanks!   Ruben Meredith [facility]  Anticoagulation Service Pharmacist  10/7/2021 10:11 AM  For Pharmacy Admin Tracking Only        Total # of Interventions Recommended: 0   Total # of Interventions Accepted: 0   Time Spent (min): 15

## 2021-10-27 LAB — INR BLD: 6.03

## 2021-10-30 ENCOUNTER — HOSPITAL ENCOUNTER (OUTPATIENT)
Dept: PHARMACY | Age: 81
Setting detail: THERAPIES SERIES
Discharge: HOME | End: 2021-10-30
Payer: MEDICARE

## 2021-10-30 DIAGNOSIS — I48.91 NEW ONSET ATRIAL FIBRILLATION (HCC): Primary | ICD-10-CM

## 2021-10-30 DIAGNOSIS — I34.0 NONRHEUMATIC MITRAL VALVE REGURGITATION: ICD-10-CM

## 2021-10-30 LAB
INR BLD: 2
PROTIME: 24 SECONDS

## 2021-10-30 PROCEDURE — 85610 PROTHROMBIN TIME: CPT

## 2021-10-30 PROCEDURE — 36416 COLLJ CAPILLARY BLOOD SPEC: CPT

## 2021-10-30 PROCEDURE — 99211 OFF/OP EST MAY X REQ PHY/QHP: CPT

## 2021-10-30 NOTE — PROGRESS NOTES
ANTICOAGULATION SERVICE    Date of Clinic Visit:  10/30/2021    Debbie Son is a 78 y.o. female who presents to clinic today for anticoagulation monitoring and adjustment. Recent INR Results:  Internal QC passed  Lab Results   Component Value Date    INR 2 10/30/2021    INR 6.03 10/27/2021       Current Warfarin Dosage:  Dosing Plan  As of 10/30/2021    TTR:  50.8 % (2.5 mo)   Full warfarin instructions:  2 mg every Mon, Wed, Fri; 4 mg all other days               Assessment/Plan:    Modify warfarin dose as noted above: Patient is here d/t recent ER visit recording INR of 6.03.  CC was contacted and Pt held warfarin for 2 days and INR 2.0. Restart at reduced dose and re-check 1.5 weeks. Next Clinic Appointment:  Return date  As of 10/30/2021    TTR:  50.8 % (2.5 mo)   Next INR check:  11/9/2021             Please call [facility] Anticoagulation Clinic at [phone] with any questions. Thanks!   Rika Sandra [facility]  Anticoagulation Service Pharmacist  10/30/2021 10:41 AM  For Pharmacy Admin Tracking Only     Intervention Detail: Dose Adjustment: 1, reason: Therapy De-escalation   Total # of Interventions Recommended: 1   Total # of Interventions Accepted: 1   Time Spent (min): 20

## 2021-10-30 NOTE — PATIENT INSTRUCTIONS
\"On day of next appointment, please screen for temperature and COVID-19 symptoms prior to you clinic appointment. If any symptoms present, please call [phone] to reschedule. \"

## 2021-11-04 ENCOUNTER — HOSPITAL ENCOUNTER (INPATIENT)
Age: 81
LOS: 3 days | Discharge: NURSING, REHAB OR LONG-TERM CARE | DRG: 481 | End: 2021-11-07
Attending: EMERGENCY MEDICINE | Admitting: SURGERY
Payer: MEDICARE

## 2021-11-04 ENCOUNTER — APPOINTMENT (OUTPATIENT)
Dept: CT IMAGING | Age: 81
End: 2021-11-04
Payer: MEDICARE

## 2021-11-04 ENCOUNTER — APPOINTMENT (OUTPATIENT)
Dept: GENERAL RADIOLOGY | Age: 81
End: 2021-11-04
Payer: MEDICARE

## 2021-11-04 ENCOUNTER — HOSPITAL ENCOUNTER (EMERGENCY)
Age: 81
Discharge: ANOTHER HOSPITAL | End: 2021-11-04
Attending: EMERGENCY MEDICINE
Payer: MEDICARE

## 2021-11-04 ENCOUNTER — APPOINTMENT (OUTPATIENT)
Dept: GENERAL RADIOLOGY | Age: 81
DRG: 481 | End: 2021-11-04
Payer: MEDICARE

## 2021-11-04 ENCOUNTER — APPOINTMENT (OUTPATIENT)
Dept: CT IMAGING | Age: 81
DRG: 481 | End: 2021-11-04
Payer: MEDICARE

## 2021-11-04 VITALS
BODY MASS INDEX: 22.42 KG/M2 | RESPIRATION RATE: 14 BRPM | HEIGHT: 59 IN | SYSTOLIC BLOOD PRESSURE: 135 MMHG | OXYGEN SATURATION: 100 % | HEART RATE: 52 BPM | DIASTOLIC BLOOD PRESSURE: 81 MMHG | TEMPERATURE: 98.4 F
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
## 2021-11-04 DIAGNOSIS — S72.002A CLOSED LEFT HIP FRACTURE, INITIAL ENCOUNTER (HCC): ICD-10-CM

## 2021-11-04 DIAGNOSIS — S72.002A CLOSED FRACTURE OF LEFT HIP, INITIAL ENCOUNTER (HCC): Primary | ICD-10-CM

## 2021-11-04 DIAGNOSIS — M25.552 LEFT HIP PAIN: Primary | ICD-10-CM
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
## 2021-11-04 LAB
-: ABNORMAL
ABSOLUTE EOS #: 0.06 K/UL (ref 0–0.44)
ABSOLUTE IMMATURE GRANULOCYTE: 0.08 K/UL (ref 0–0.3)
ABSOLUTE LYMPH #: 0.66 K/UL (ref 1.1–3.7)
ABSOLUTE MONO #: 0.93 K/UL (ref 0.1–1.2)
AMORPHOUS: ABNORMAL
ANION GAP SERPL CALCULATED.3IONS-SCNC: 9 MMOL/L (ref 9–17)
BACTERIA: ABNORMAL
BASOPHILS # BLD: 1 % (ref 0–2)
BASOPHILS ABSOLUTE: 0.05 K/UL (ref 0–0.2)
BILIRUBIN URINE: NEGATIVE
BUN BLDV-MCNC: 12 MG/DL (ref 8–23)
BUN/CREAT BLD: 22 (ref 9–20)
CALCIUM SERPL-MCNC: 8.7 MG/DL (ref 8.6–10.4)
CASTS UA: ABNORMAL /LPF (ref 0–2)
CHLORIDE BLD-SCNC: 103 MMOL/L (ref 98–107)
CO2: 27 MMOL/L (ref 20–31)
COLOR: ABNORMAL
COMMENT UA: ABNORMAL
CREAT SERPL-MCNC: 0.54 MG/DL (ref 0.5–0.9)
CRYSTALS, UA: ABNORMAL /HPF
DIFFERENTIAL TYPE: ABNORMAL
EKG ATRIAL RATE: 65 BPM
EKG P AXIS: 22 DEGREES
EKG P-R INTERVAL: 148 MS
EKG Q-T INTERVAL: 462 MS
EKG QRS DURATION: 88 MS
EKG QTC CALCULATION (BAZETT): 480 MS
EKG R AXIS: -20 DEGREES
EKG T AXIS: 69 DEGREES
EKG VENTRICULAR RATE: 65 BPM
EOSINOPHILS RELATIVE PERCENT: 1 % (ref 1–4)
EPITHELIAL CELLS UA: ABNORMAL /HPF (ref 0–5)
GFR AFRICAN AMERICAN: >60 ML/MIN
GFR NON-AFRICAN AMERICAN: >60 ML/MIN
GFR SERPL CREATININE-BSD FRML MDRD: ABNORMAL ML/MIN/{1.73_M2}
GFR SERPL CREATININE-BSD FRML MDRD: ABNORMAL ML/MIN/{1.73_M2}
GLUCOSE BLD-MCNC: 106 MG/DL (ref 70–99)
GLUCOSE URINE: NEGATIVE
HCT VFR BLD CALC: 29.2 % (ref 36.3–47.1)
HEMOGLOBIN: 8.9 G/DL (ref 11.9–15.1)
IMMATURE GRANULOCYTES: 1 %
INR BLD: 2.5
KETONES, URINE: NEGATIVE
LEUKOCYTE ESTERASE, URINE: NEGATIVE
LYMPHOCYTES # BLD: 7 % (ref 24–43)
MCH RBC QN AUTO: 29.2 PG (ref 25.2–33.5)
MCHC RBC AUTO-ENTMCNC: 30.5 G/DL (ref 25.2–33.5)
MCV RBC AUTO: 95.7 FL (ref 82.6–102.9)
MONOCYTES # BLD: 10 % (ref 3–12)
MUCUS: ABNORMAL
NITRITE, URINE: NEGATIVE
NRBC AUTOMATED: 0 PER 100 WBC
OTHER OBSERVATIONS UA: ABNORMAL
PDW BLD-RTO: 19.6 % (ref 11.8–14.4)
PH UA: 5.5 (ref 5–6)
PLATELET # BLD: 189 K/UL (ref 138–453)
PLATELET ESTIMATE: ABNORMAL
PMV BLD AUTO: 11.8 FL (ref 8.1–13.5)
POTASSIUM SERPL-SCNC: 3.8 MMOL/L (ref 3.7–5.3)
PROTEIN UA: NEGATIVE
PROTHROMBIN TIME: 25.6 SEC (ref 11.5–14.2)
RBC # BLD: 3.05 M/UL (ref 3.95–5.11)
RBC # BLD: ABNORMAL 10*6/UL
RBC UA: ABNORMAL /HPF (ref 0–4)
RENAL EPITHELIAL, UA: ABNORMAL /HPF
SARS-COV-2, RAPID: NOT DETECTED
SEG NEUTROPHILS: 80 % (ref 36–65)
SEGMENTED NEUTROPHILS ABSOLUTE COUNT: 7.73 K/UL (ref 1.5–8.1)
SODIUM BLD-SCNC: 139 MMOL/L (ref 135–144)
SPECIFIC GRAVITY UA: 1.03 (ref 1.01–1.02)
SPECIMEN DESCRIPTION: NORMAL
TRICHOMONAS: ABNORMAL
TROPONIN INTERP: ABNORMAL
TROPONIN T: ABNORMAL NG/ML
TROPONIN, HIGH SENSITIVITY: 15 NG/L (ref 0–14)
TURBIDITY: ABNORMAL
URINE HGB: NEGATIVE
UROBILINOGEN, URINE: NORMAL
VITAMIN D 25-HYDROXY: 26.2 NG/ML (ref 30–100)
WBC # BLD: 9.5 K/UL (ref 3.5–11.3)
WBC # BLD: ABNORMAL 10*3/UL
WBC UA: ABNORMAL /HPF (ref 0–4)
YEAST: ABNORMAL

## 2021-11-04 PROCEDURE — 73502 X-RAY EXAM HIP UNI 2-3 VIEWS: CPT

## 2021-11-04 PROCEDURE — 85025 COMPLETE CBC W/AUTO DIFF WBC: CPT

## 2021-11-04 PROCEDURE — 6360000002 HC RX W HCPCS

## 2021-11-04 PROCEDURE — 36415 COLL VENOUS BLD VENIPUNCTURE: CPT

## 2021-11-04 PROCEDURE — 82306 VITAMIN D 25 HYDROXY: CPT

## 2021-11-04 PROCEDURE — 2580000003 HC RX 258

## 2021-11-04 PROCEDURE — 81001 URINALYSIS AUTO W/SCOPE: CPT

## 2021-11-04 PROCEDURE — 73552 X-RAY EXAM OF FEMUR 2/>: CPT

## 2021-11-04 PROCEDURE — 73610 X-RAY EXAM OF ANKLE: CPT

## 2021-11-04 PROCEDURE — 6370000000 HC RX 637 (ALT 250 FOR IP)

## 2021-11-04 PROCEDURE — 71045 X-RAY EXAM CHEST 1 VIEW: CPT

## 2021-11-04 PROCEDURE — 73590 X-RAY EXAM OF LOWER LEG: CPT

## 2021-11-04 PROCEDURE — 85610 PROTHROMBIN TIME: CPT

## 2021-11-04 PROCEDURE — 1200000000 HC SEMI PRIVATE

## 2021-11-04 PROCEDURE — 96374 THER/PROPH/DIAG INJ IV PUSH: CPT

## 2021-11-04 PROCEDURE — 84484 ASSAY OF TROPONIN QUANT: CPT

## 2021-11-04 PROCEDURE — 96376 TX/PRO/DX INJ SAME DRUG ADON: CPT

## 2021-11-04 PROCEDURE — 6360000002 HC RX W HCPCS: Performed by: HEALTH CARE PROVIDER

## 2021-11-04 PROCEDURE — 70450 CT HEAD/BRAIN W/O DYE: CPT

## 2021-11-04 PROCEDURE — 87635 SARS-COV-2 COVID-19 AMP PRB: CPT

## 2021-11-04 PROCEDURE — 80048 BASIC METABOLIC PNL TOTAL CA: CPT

## 2021-11-04 PROCEDURE — 99285 EMERGENCY DEPT VISIT HI MDM: CPT

## 2021-11-04 PROCEDURE — 99221 1ST HOSP IP/OBS SF/LOW 40: CPT | Performed by: ORTHOPAEDIC SURGERY

## 2021-11-04 PROCEDURE — 93005 ELECTROCARDIOGRAM TRACING: CPT | Performed by: EMERGENCY MEDICINE

## 2021-11-04 PROCEDURE — 94761 N-INVAS EAR/PLS OXIMETRY MLT: CPT

## 2021-11-04 PROCEDURE — 6360000002 HC RX W HCPCS: Performed by: EMERGENCY MEDICINE

## 2021-11-04 PROCEDURE — 72125 CT NECK SPINE W/O DYE: CPT

## 2021-11-04 PROCEDURE — 73700 CT LOWER EXTREMITY W/O DYE: CPT

## 2021-11-04 PROCEDURE — 96375 TX/PRO/DX INJ NEW DRUG ADDON: CPT

## 2021-11-04 RX ORDER — FENTANYL CITRATE 50 UG/ML
50 INJECTION, SOLUTION INTRAMUSCULAR; INTRAVENOUS ONCE
Status: COMPLETED | OUTPATIENT
Start: 2021-11-04 | End: 2021-11-04

## 2021-11-04 RX ORDER — MORPHINE SULFATE 4 MG/ML
4 INJECTION, SOLUTION INTRAMUSCULAR; INTRAVENOUS ONCE
Status: DISCONTINUED | OUTPATIENT
Start: 2021-11-04 | End: 2021-11-04

## 2021-11-04 RX ORDER — SODIUM CHLORIDE 0.9 % (FLUSH) 0.9 %
5-40 SYRINGE (ML) INJECTION EVERY 12 HOURS SCHEDULED
Status: DISCONTINUED | OUTPATIENT
Start: 2021-11-04 | End: 2021-11-07 | Stop reason: HOSPADM

## 2021-11-04 RX ORDER — ONDANSETRON 2 MG/ML
4 INJECTION INTRAMUSCULAR; INTRAVENOUS ONCE
Status: COMPLETED | OUTPATIENT
Start: 2021-11-04 | End: 2021-11-04

## 2021-11-04 RX ORDER — ONDANSETRON 2 MG/ML
4 INJECTION INTRAMUSCULAR; INTRAVENOUS EVERY 6 HOURS PRN
Status: DISCONTINUED | OUTPATIENT
Start: 2021-11-04 | End: 2021-11-07 | Stop reason: HOSPADM

## 2021-11-04 RX ORDER — SODIUM CHLORIDE 9 MG/ML
25 INJECTION, SOLUTION INTRAVENOUS PRN
Status: DISCONTINUED | OUTPATIENT
Start: 2021-11-04 | End: 2021-11-05

## 2021-11-04 RX ORDER — POLYETHYLENE GLYCOL 3350 17 G/17G
17 POWDER, FOR SOLUTION ORAL DAILY
Status: DISCONTINUED | OUTPATIENT
Start: 2021-11-04 | End: 2021-11-07 | Stop reason: HOSPADM

## 2021-11-04 RX ORDER — SODIUM CHLORIDE 0.9 % (FLUSH) 0.9 %
5-40 SYRINGE (ML) INJECTION PRN
Status: DISCONTINUED | OUTPATIENT
Start: 2021-11-04 | End: 2021-11-07 | Stop reason: HOSPADM

## 2021-11-04 RX ORDER — ONDANSETRON 2 MG/ML
INJECTION INTRAMUSCULAR; INTRAVENOUS
Status: COMPLETED
Start: 2021-11-04 | End: 2021-11-04

## 2021-11-04 RX ORDER — MORPHINE SULFATE 4 MG/ML
4 INJECTION, SOLUTION INTRAMUSCULAR; INTRAVENOUS ONCE
Status: COMPLETED | OUTPATIENT
Start: 2021-11-04 | End: 2021-11-04

## 2021-11-04 RX ORDER — SENNA PLUS 8.6 MG/1
1 TABLET ORAL DAILY PRN
Status: DISCONTINUED | OUTPATIENT
Start: 2021-11-04 | End: 2021-11-07 | Stop reason: HOSPADM

## 2021-11-04 RX ORDER — SODIUM CHLORIDE, SODIUM LACTATE, POTASSIUM CHLORIDE, CALCIUM CHLORIDE 600; 310; 30; 20 MG/100ML; MG/100ML; MG/100ML; MG/100ML
INJECTION, SOLUTION INTRAVENOUS CONTINUOUS
Status: DISCONTINUED | OUTPATIENT
Start: 2021-11-04 | End: 2021-11-06

## 2021-11-04 RX ORDER — GABAPENTIN 300 MG/1
300 CAPSULE ORAL EVERY 8 HOURS SCHEDULED
Status: DISCONTINUED | OUTPATIENT
Start: 2021-11-04 | End: 2021-11-05

## 2021-11-04 RX ORDER — ACETAMINOPHEN 500 MG
1000 TABLET ORAL EVERY 8 HOURS SCHEDULED
Status: DISCONTINUED | OUTPATIENT
Start: 2021-11-04 | End: 2021-11-07 | Stop reason: HOSPADM

## 2021-11-04 RX ORDER — ONDANSETRON 4 MG/1
4 TABLET, ORALLY DISINTEGRATING ORAL EVERY 8 HOURS PRN
Status: DISCONTINUED | OUTPATIENT
Start: 2021-11-04 | End: 2021-11-07 | Stop reason: HOSPADM

## 2021-11-04 RX ADMIN — FENTANYL CITRATE 50 MCG: 50 INJECTION INTRAMUSCULAR; INTRAVENOUS at 14:48

## 2021-11-04 RX ADMIN — PHYTONADIONE 5 MG: 10 INJECTION, EMULSION INTRAMUSCULAR; INTRAVENOUS; SUBCUTANEOUS at 22:22

## 2021-11-04 RX ADMIN — FENTANYL CITRATE 50 MCG: 50 INJECTION INTRAMUSCULAR; INTRAVENOUS at 13:03

## 2021-11-04 RX ADMIN — ACETAMINOPHEN 1000 MG: 500 TABLET ORAL at 22:26

## 2021-11-04 RX ADMIN — FENTANYL CITRATE 50 MCG: 50 INJECTION, SOLUTION INTRAMUSCULAR; INTRAVENOUS at 20:00

## 2021-11-04 RX ADMIN — ONDANSETRON 4 MG: 2 INJECTION INTRAMUSCULAR; INTRAVENOUS at 17:18

## 2021-11-04 RX ADMIN — SODIUM CHLORIDE, PRESERVATIVE FREE 10 ML: 5 INJECTION INTRAVENOUS at 22:27

## 2021-11-04 RX ADMIN — MORPHINE SULFATE 4 MG: 4 INJECTION, SOLUTION INTRAMUSCULAR; INTRAVENOUS at 17:11

## 2021-11-04 ASSESSMENT — PAIN DESCRIPTION - PAIN TYPE
TYPE: ACUTE PAIN

## 2021-11-04 ASSESSMENT — PAIN SCALES - GENERAL
PAINLEVEL_OUTOF10: 6
PAINLEVEL_OUTOF10: 8
PAINLEVEL_OUTOF10: 8
PAINLEVEL_OUTOF10: 5
PAINLEVEL_OUTOF10: 8
PAINLEVEL_OUTOF10: 0
PAINLEVEL_OUTOF10: 8

## 2021-11-04 ASSESSMENT — PAIN DESCRIPTION - ORIENTATION
ORIENTATION: LEFT

## 2021-11-04 ASSESSMENT — ENCOUNTER SYMPTOMS
SHORTNESS OF BREATH: 0
DIARRHEA: 0
VOMITING: 0
TROUBLE SWALLOWING: 0
NAUSEA: 0
ABDOMINAL PAIN: 0
BACK PAIN: 0

## 2021-11-04 ASSESSMENT — PAIN DESCRIPTION - FREQUENCY
FREQUENCY: CONTINUOUS
FREQUENCY: CONTINUOUS

## 2021-11-04 ASSESSMENT — PAIN DESCRIPTION - DESCRIPTORS
DESCRIPTORS: ACHING

## 2021-11-04 ASSESSMENT — PAIN DESCRIPTION - LOCATION
LOCATION: HIP

## 2021-11-04 NOTE — ED NOTES
Pt tx from [facility] fell outside, son found pt on ground, denies LOC. Pt is c/o lt hip pain denies CP SOB.    Call light in reach Dr Cynthia Jerez at bedside for eval .     Beth Franklin RN  11/04/21 [address], RN  11/04/21 1949

## 2021-11-04 NOTE — ED NOTES
Bed: 25  Expected date:   Expected time:   Means of arrival:   Comments:  [address] Daniel RN  11/04/21 [id number]

## 2021-11-04 NOTE — ED PROVIDER NOTES
[facility]  ED  Emergency Department Encounter  Emergency Medicine Resident     Pt Name: Katie Davis  MRN: [id number]  [place] 1940  Date of evaluation: 11/4/21  PCP:  Aidan Avendano MD    [address] [place] [address]       Chief Complaint   Patient presents with   [place] Lung    Hip Pain     lt hip fx       HISTORY OFPRESENT ILLNESS  (Location/Symptom, Timing/Onset, Context/Setting, Quality, Duration, Modifying [place].)      Katie Davis is a 78 y.o. female who presents with left hip pain status post fall. Patient states she was going to place a birthday card in the mail this morning, when she slipped and fell. She experienced severe left hip pain and was unable to get up. Her son was able to come over and assist her into the house. She was seen and evaluated at [facility], where work-up was significant for a left intertrochanteric hip fracture. Patient was transferred here for evaluation by trauma and orthopedics. Patient currently complains of pain in left hip that is constant and throbbing. 8/10 in severity. No radiation. Exacerbated with movement. No relieving factors. Patient is on warfarin for history of stroke and INR was 2.5 on lab eval today. PAST MEDICAL / SURGICAL / SOCIAL / FAMILY HISTORY      has a past medical history of Chronic back pain, Neuropathy, and Stroke ([address] has a past surgical history that includes Hysterectomy; Appendectomy; Abdomen surgery (1995); Fixation Kyphoplasty (7/3/14); and Spine surgery (N/A, 5/10/2021). Social History     Socioeconomic History    Marital status:       Spouse name: Not on file    Number of children: Not on file    Years of education: Not on file    Highest education level: Not on file   Occupational History    Not on file   Tobacco Use    Smoking status: Never Smoker    Smokeless tobacco: Never Used   Substance and Sexual Activity    Alcohol use: No    Drug use: No    Sexual activity: Not on file Other Topics Concern    Not on file   Social History Narrative    Not on file     Social Determinants of Health     Financial Resource Strain:     Difficulty of Paying Living Expenses:    Food Insecurity:     Worried About Running Out of Food in the Last Year:     [address] in the Last Year:    Transportation Needs:     Lack of Transportation (Medical):  Lack of Transportation (Non-Medical):    Physical Activity:     Days of Exercise per Week:     Minutes of Exercise per Session:    Stress:     Feeling of Stress :    Social Connections:     Frequency of Communication with Friends and Family:     Frequency of Social Gatherings with Friends and Family:     Attends [employer] Services:     Active Member of Clubs or Organizations:     Attends Club or Organization Meetings:     Marital Status:    Intimate Partner Violence:     Fear of Current or Ex-Partner:     Emotionally Abused:     Physically Abused:     Sexually Abused:        No contributory family history    Allergies:  Asa [aspirin], Codeine, and Pcn [penicillins]    Home Medications:  Prior to Admission medications    Medication Sig Start Date End Date Taking?  Authorizing Provider   furosemide (LASIX) 20 MG tablet Take 1 tablet by mouth daily as needed (swelling, weight gain, sob) 8/16/21   Chance Andrade DO   amiodarone (CORDARONE) 200 MG tablet Take 1 tablet by mouth daily 8/3/21   Indu Bernabe MD   metoprolol tartrate (LOPRESSOR) 25 MG tablet Take 0.5 tablets by mouth 2 times daily 8/3/21   Indu Bernabe MD   warfarin (COUMADIN) 2 MG tablet Take 1 tablet by mouth daily Follow up with INR in 3 days 8/3/21   Indu Bernabe MD   alendronate (FOSAMAX) 70 MG tablet Take 70 mg by mouth once a week 8/20/20 8/20/21  Historical Provider, MD   diphenhydrAMINE-APAP, sleep, (TYLENOL PM EXTRA STRENGTH)  MG tablet Take 1 tablet by mouth nightly as needed for Sleep    Historical Provider, MD   gabapentin (NEURONTIN) 100 MG capsule Take 2 capsules by mouth 3 times daily. 3/5/21   Historical Provider, MD   sodium chloride () 5 % ophthalmic solution Apply 1 drop to eye 3 times daily    Historical Provider, MD   acetaminophen (TYLENOL 8 HOUR ARTHRITIS PAIN) 650 MG extended release tablet Take 650 mg by mouth every 8 hours as needed for Pain    Historical Provider, MD   citalopram (CELEXA) 20 MG tablet Take 20 mg by mouth daily. Historical Provider, MD   b complex vitamins capsule Take 1 capsule by mouth daily. Historical Provider, MD   Pyridoxine HCl (VITAMIN B-6) 50 MG tablet Take 50 mg by mouth daily. Historical Provider, MD   vitamin D3 (CHOLECALCIFEROL) 1000 UNITS TABS tablet Take 1,000 Units by mouth 2 times daily. Historical Provider, MD   Alum Hydroxide-Mag Trisilicate (GAVISCON) 30-37.9 MG CHEW Take by mouth as needed     Historical Provider, MD       REVIEW OFSYSTEMS    (2-9 systems for level 4, 10 or more for level 5)      Review of Systems   Constitutional: Negative for fatigue. HENT: Negative for trouble swallowing. Respiratory: Negative for shortness of breath. Cardiovascular: Negative for chest pain. Gastrointestinal: Negative for abdominal pain, diarrhea, nausea and vomiting. Genitourinary: Negative for dysuria. Musculoskeletal: Positive for arthralgias and gait problem. Negative for back pain, neck pain and neck stiffness. Skin: Negative for wound. Allergic/Immunologic: Negative for immunocompromised state. Neurological: Negative for dizziness, syncope, light-headedness and headaches. Hematological: Does not bruise/bleed easily. Psychiatric/Behavioral: Negative for confusion.        PHYSICAL EXAM   (up to 7 for level 4, 8 or more forlevel 5)      INITIAL VITALS:   ED Triage Vitals   BP Temp Temp Source Pulse Resp SpO2 Height Weight   11/04/21 1941 11/04/21 1940 11/04/21 1940 11/04/21 1940 11/04/21 1940 11/04/21 1940 11/04/21 1939 11/04/21 1939   (!) 148/58 99.7 °F (37.6 °C) Oral 63 16 96 % 4' 11\" (1.499 m) 125 lb (56.7 kg)       Physical Exam  Vitals reviewed. Constitutional:       General: She is not in acute distress. Appearance: Normal appearance. She is not ill-appearing. HENT:      Head: Normocephalic and atraumatic. Right Ear: External ear normal.      Left Ear: External ear normal.      Nose: Nose normal. No rhinorrhea. Mouth/Throat:      Mouth: Mucous membranes are moist.      Pharynx: Oropharynx is clear. Eyes:      Extraocular Movements: Extraocular movements intact. Pupils: Pupils are equal, round, and reactive to light. Cardiovascular:      Rate and Rhythm: Normal rate and regular rhythm. Pulses: Normal pulses. Heart sounds: Normal heart sounds. Pulmonary:      Effort: Pulmonary effort is normal.      Breath sounds: Normal breath sounds. Abdominal:      General: There is no distension. Palpations: Abdomen is soft. Tenderness: There is no abdominal tenderness. Musculoskeletal:         General: Tenderness present. Cervical back: Normal range of motion and neck supple. Comments: Tenderness palpation of left hip and over pubic symphysis. Left lower extremity is foreshortened by approximately 1 inch compared to the right and externally rotated. Skin:     General: Skin is warm and dry. Capillary Refill: Capillary refill takes less than 2 seconds. Neurological:      General: No focal deficit present. Mental Status: She is alert and oriented to person, place, and time.    Psychiatric:         Mood and Affect: Mood normal.         Behavior: Behavior normal.         DIFFERENTIAL  DIAGNOSIS     PLAN (LABS / IMAGING / EKG):  Orders Placed This Encounter   Procedures    Basic Metabolic Panel w/ Reflex to MG    CBC auto differential    PROTIME-INR    Diet NPO    Vital signs per unit routine    Notify patient's primary care physician of admission    Place intermittent pneumatic compression device    Strict Bedrest    Intake and output    Neurovascular checks    Telemetry monitoring - continuous duration    Full Code    Inpatient consult to Trauma Surgery    Inpatient consult to Orthopedic Surgery    Consult to Cardiology    Initiate Oxygen Therapy Protocol    Incentive spirometry    Speech language pathology evaluation    PATIENT STATUS (FROM ED OR OR/PROCEDURAL) Inpatient       MEDICATIONS ORDERED:  Orders Placed This Encounter   Medications    DISCONTD: morphine injection 4 mg    fentaNYL (SUBLIMAZE) injection 50 mcg    sodium chloride flush 0.9 % injection 5-40 mL    sodium chloride flush 0.9 % injection 5-40 mL    0.9 % sodium chloride infusion    OR Linked Order Group     ondansetron (ZOFRAN-ODT) disintegrating tablet 4 mg     ondansetron (ZOFRAN) injection 4 mg    polyethylene glycol (GLYCOLAX) packet 17 g    acetaminophen (TYLENOL) tablet 1,000 mg    gabapentin (NEURONTIN) capsule 300 mg    senna (SENOKOT) tablet 8.6 mg    lactated ringers infusion       Initial MDM/Plan: 78 y.o. female who presents with left hip pain status post fall with intertrochanteric fracture of the femoral neck. No other apparent injuries at this time. CT of the head and neck were negative for any acute injury. No additional work-up is required at this time. Will consult trauma and orthopedic surgery for additional evaluation and treatment.     DIAGNOSTIC RESULTS / EMERGENCYDEPARTMENT COURSE / MDM     LABS:  Labs Reviewed   BASIC METABOLIC PANEL W/ REFLEX TO MG FOR LOW K   CBC WITH AUTO DIFFERENTIAL   PROTIME-INR         RADIOLOGY:  CT HEAD WO CONTRAST    Result Date: 11/4/2021  EXAMINATION: CT OF THE HEAD WITHOUT CONTRAST; CT OF THE CERVICAL SPINE WITHOUT CONTRAST 11/4/2021 1:39 pm TECHNIQUE: CT of the head was performed without the administration of intravenous contrast. Dose modulation, iterative reconstruction, and/or weight based adjustment of the mA/kV was utilized to reduce the radiation dose to as low as reasonably achievable.; CT of the cervical spine was performed without the administration of intravenous contrast. Multiplanar reformatted images are provided for review. Dose modulation, iterative reconstruction, and/or weight based adjustment of the mA/kV was utilized to reduce the radiation dose to as low as reasonably achievable. COMPARISON: None HISTORY: ORDERING SYSTEM PROVIDED HISTORY: fall TECHNOLOGIST PROVIDED HISTORY: fall Reason for Exam: fall Acuity: Acute Type of Exam: Initial; ORDERING SYSTEM PROVIDED HISTORY: fall TECHNOLOGIST PROVIDED HISTORY: fall Decision Support Exception - unselect if not a suspected or confirmed emergency medical condition->Emergency Medical Condition (MA) Reason for Exam: fall Acuity: Acute Type of Exam: Initial FINDINGS: CT head: BRAIN/VENTRICLES: There is no acute intracranial hemorrhage, mass effect or midline shift. No abnormal extra-axial fluid collection. The gray-white differentiation is maintained without evidence of an acute infarct. There is no evidence of hydrocephalus. Patient has scattered hypodensity in the white matter consistent with significant chronic white matter ischemia most significant in the left frontal lobe. Calcification in the basal ganglia noted. Calcification of the cavernous carotid arteries and vertebral arteries is present. ORBITS: The visualized portion of the orbits demonstrate no acute abnormality. SINUSES: The visualized paranasal sinuses and mastoid air cells demonstrate no acute abnormality. SOFT TISSUES/SKULL: No acute abnormality of the visualized skull or soft tissues. CT C-spine: BONES/ALIGNMENT: There is no acute fracture or traumatic malalignment. Mild dextroscoliotic curvature is noted. DEGENERATIVE CHANGES: The patient has severe degenerative and degenerative disc changes C5 through C7 with appearance of auto fusion at the C5-C6 and C6-C7 levels. Severe bilateral neural foraminal narrowing is present C6-C7 and C7-T1. Moderate atlantoaxial degenerative changes are present. The patient has considerable facet changes. Mild canal stenosis is present C6-C7. SOFT TISSUES: There is no prevertebral soft tissue swelling. Lung apices are unremarkable. CT head: No acute intracranial abnormality. Senescent changes including significant chronic microvascular change. Atherosclerotic disease. CT C-spine: No acute fracture or traumatic malalignment. Patient has significant degenerative and degenerative disc disease more significant in the lower cervical spine with canal stenosis C6-C7 and severe neural foraminal narrowing as above. CT CERVICAL SPINE WO CONTRAST    Result Date: 11/4/2021  EXAMINATION: CT OF THE HEAD WITHOUT CONTRAST; CT OF THE CERVICAL SPINE WITHOUT CONTRAST 11/4/2021 1:39 pm TECHNIQUE: CT of the head was performed without the administration of intravenous contrast. Dose modulation, iterative reconstruction, and/or weight based adjustment of the mA/kV was utilized to reduce the radiation dose to as low as reasonably achievable.; CT of the cervical spine was performed without the administration of intravenous contrast. Multiplanar reformatted images are provided for review. Dose modulation, iterative reconstruction, and/or weight based adjustment of the mA/kV was utilized to reduce the radiation dose to as low as reasonably achievable. COMPARISON: None HISTORY: ORDERING SYSTEM PROVIDED HISTORY: fall TECHNOLOGIST PROVIDED HISTORY: fall Reason for Exam: fall Acuity: Acute Type of Exam: Initial; ORDERING SYSTEM PROVIDED HISTORY: fall TECHNOLOGIST PROVIDED HISTORY: fall Decision Support Exception - unselect if not a suspected or confirmed emergency medical condition->Emergency Medical Condition (MA) Reason for Exam: fall Acuity: Acute Type of Exam: Initial FINDINGS: CT head: BRAIN/VENTRICLES: There is no acute intracranial hemorrhage, mass effect or midline shift. No abnormal extra-axial fluid collection.   The gray-white differentiation is maintained without evidence of an acute infarct. There is no evidence of hydrocephalus. Patient has scattered hypodensity in the white matter consistent with significant chronic white matter ischemia most significant in the left frontal lobe. Calcification in the basal ganglia noted. Calcification of the cavernous carotid arteries and vertebral arteries is present. ORBITS: The visualized portion of the orbits demonstrate no acute abnormality. SINUSES: The visualized paranasal sinuses and mastoid air cells demonstrate no acute abnormality. SOFT TISSUES/SKULL: No acute abnormality of the visualized skull or soft tissues. CT C-spine: BONES/ALIGNMENT: There is no acute fracture or traumatic malalignment. Mild dextroscoliotic curvature is noted. DEGENERATIVE CHANGES: The patient has severe degenerative and degenerative disc changes C5 through C7 with appearance of auto fusion at the C5-C6 and C6-C7 levels. Severe bilateral neural foraminal narrowing is present C6-C7 and C7-T1. Moderate atlantoaxial degenerative changes are present. The patient has considerable facet changes. Mild canal stenosis is present C6-C7. SOFT TISSUES: There is no prevertebral soft tissue swelling. Lung apices are unremarkable. CT head: No acute intracranial abnormality. Senescent changes including significant chronic microvascular change. Atherosclerotic disease. CT C-spine: No acute fracture or traumatic malalignment. Patient has significant degenerative and degenerative disc disease more significant in the lower cervical spine with canal stenosis C6-C7 and severe neural foraminal narrowing as above. XR CHEST PORTABLE    Result Date: 11/4/2021  EXAMINATION: ONE XRAY VIEW OF THE CHEST 11/4/2021 12:47 pm COMPARISON: 07/30/2021.  HISTORY: ORDERING SYSTEM PROVIDED HISTORY: fall TECHNOLOGIST PROVIDED HISTORY: fall Reason for Exam: Fall; left hip pain, limited movement Acuity: Acute Type of Exam: Initial FINDINGS: Tortuosity of the descending aorta was noted. Mild cardiomegaly was noted without pneumonia, interstitial edema or pleural effusions. Moderate degenerative osteo arthritic changes involve the right glenohumeral joint. Multiple metallic surgical clips were noted in the left upper abdominal quadrant. Post vertebroplasty changes involve T12 and L1. No significant change has occurred from 07/30/2021. Mild cardiomegaly without pneumonia, interstitial edema or pleural effusions. No significant change from 07/30/2021. XR HIP 2-3 VW W PELVIS LEFT    Result Date: 11/4/2021  EXAMINATION: ONE XRAY VIEW OF THE PELVIS AND TWO XRAY VIEWS LEFT HIP 11/4/2021 1:47 pm COMPARISON: None. HISTORY: ORDERING SYSTEM PROVIDED HISTORY: fall hip pain TECHNOLOGIST PROVIDED HISTORY: fall hip pain Reason for Exam: Fall; left hip pain, limited movement Acuity: Acute Type of Exam: Initial FINDINGS: Intertrochanteric fracture left femoral neck. No dislocation. No other fractures. Intertrochanteric fracture left femoral neck       EKG    EKG Interpretation    Interpreted by me    Rhythm: normal sinus   Rate: normal  Axis: normal  Ectopy: none  Conduction: normal  ST Segments: no acute change  T Waves: no acute change  Q Waves: none    Clinical Impression: no acute changes and normal EKG    All EKG's are interpreted by the Emergency Department Physicianwho either signs or Co-signs this chart in the absence of a cardiologist.    EMERGENCY DEPARTMENT COURSE:  ED Course as of Nov 04 2044   Thu Nov 04, 2021 2043 Orthopedics will plan for OR tonight or tomorrow. Patient is admitted to the trauma surgery service. [GG]      ED Course User Index  [GG] Alon Hernández MD          PROCEDURES:  None    CONSULTS:  IP CONSULT TO TRAUMA SURGERY  IP CONSULT TO ORTHOPEDIC SURGERY  IP CONSULT TO CARDIOLOGY    CRITICAL CARE:  Please see attending note    FINAL IMPRESSION      1. Left hip pain    2. Fall, initial encounter    3. Closed left hip fracture, initial encounter ([facility] [address]          DISPOSITION / PLAN     DISPOSITION Admitted 11/04/2021 08:42:11 PM      PATIENT REFERRED TO:  No follow-up provider specified.     DISCHARGE MEDICATIONS:  New Prescriptions    No medications on file       Hoa De La Paz MD  Emergency Medicine Resident    (Please note that portions of this note were completed with a voice recognition program.Efforts were made to edit the dictations but occasionally words are mis-transcribed.)        Hoa De La Paz MD  Resident  11/04/21 2045

## 2021-11-04 NOTE — ED PROVIDER NOTES
[facility] ED  [address]  [place] [address]  Phone: [phone]        Pt Name: Jed Triana  MRN: [id number]  [place]ranc[place] 1940  Date of evaluation: 11/4/21      CHIEF COMPLAINT     Chief Complaint   Patient presents with    Fall    Hip Pain     left          HISTORY OF PRESENT ILLNESS  (Location/Symptom, Timing/Onset, Context/Setting, Quality, Duration, Modifying Factors, Severity.)    Jed Triana is a 78 y.o. female who presents for fall. The patient was on her way to her mailbox, when she fell, landing on her left side. She is unsure if she hit her head. Is difficult to obtain history from the patient she just keeps repeating that her hip hurts. No vomiting. REVIEW OF SYSTEMS    (2-9 systems for level 4, 10 or more for level 5)     Review of Systems   Unable to perform ROS: Dementia       PAST MEDICAL HISTORY    has a past medical history of Chronic back pain, Neuropathy, and Stroke ([facility] [address] SURGICAL HISTORY      has a past surgical history that includes Hysterectomy; Appendectomy; Abdomen surgery (1995); Fixation Kyphoplasty (7/3/14); and Spine surgery (N/A, 5/10/2021). CURRENTMEDICATIONS       Previous Medications    ACETAMINOPHEN (TYLENOL 8 HOUR ARTHRITIS PAIN) 650 MG EXTENDED RELEASE TABLET    Take 650 mg by mouth every 8 hours as needed for Pain    ALENDRONATE (FOSAMAX) 70 MG TABLET    Take 70 mg by mouth once a week    ALUM HYDROXIDE-MAG TRISILICATE (GAVISCON) 84-67.7 MG CHEW    Take by mouth as needed     AMIODARONE (CORDARONE) 200 MG TABLET    Take 1 tablet by mouth daily    B COMPLEX VITAMINS CAPSULE    Take 1 capsule by mouth daily. CITALOPRAM (CELEXA) 20 MG TABLET    Take 20 mg by mouth daily.     DIPHENHYDRAMINE-APAP, SLEEP, (TYLENOL PM EXTRA STRENGTH)  MG TABLET    Take 1 tablet by mouth nightly as needed for Sleep    FUROSEMIDE (LASIX) 20 MG TABLET    Take 1 tablet by mouth daily as needed (swelling, weight gain, sob)    GABAPENTIN (NEURONTIN) 100 MG CAPSULE    Take 2 capsules by mouth 3 times daily. METOPROLOL TARTRATE (LOPRESSOR) 25 MG TABLET    Take 0.5 tablets by mouth 2 times daily    PYRIDOXINE HCL (VITAMIN B-6) 50 MG TABLET    Take 50 mg by mouth daily. SODIUM CHLORIDE () 5 % OPHTHALMIC SOLUTION    Apply 1 drop to eye 3 times daily    VITAMIN D3 (CHOLECALCIFEROL) 1000 UNITS TABS TABLET    Take 1,000 Units by mouth 2 times daily. WARFARIN (COUMADIN) 2 MG TABLET    Take 1 tablet by mouth daily Follow up with INR in 3 days       ALLERGIES     is allergic to asa [aspirin], codeine, and pcn [penicillins]. FAMILY HISTORY     has no family status information on file. family history is not on file. SOCIAL HISTORY      reports that she has never smoked. She has never used smokeless tobacco. She reports that she does not drink alcohol and does not use drugs. PHYSICAL EXAM    (up to 7 for level 4, 8 or more for level 5)   INITIAL VITALS:  height is 4' 11\" (1.499 m). Her tympanic temperature is 98.4 °F (36.9 °C). Her blood pressure is 125/51 (abnormal) and her pulse is 67. Her respiration is 16 and oxygen saturation is 98%. Physical Exam  Vitals and nursing note reviewed. Constitutional:       Appearance: Normal appearance. HENT:      Head: Normocephalic and atraumatic. Eyes:      Extraocular Movements: Extraocular movements intact. Pupils: Pupils are equal, round, and reactive to light. Cardiovascular:      Rate and Rhythm: Normal rate and regular rhythm. Pulses: Normal pulses. Heart sounds: Normal heart sounds. Pulmonary:      Effort: Pulmonary effort is normal.      Breath sounds: Normal breath sounds. No wheezing, rhonchi or rales. Abdominal:      General: Abdomen is flat. Bowel sounds are normal.      Palpations: Abdomen is soft. Tenderness: There is no abdominal tenderness. There is no guarding or rebound. Musculoskeletal:         General: Tenderness present.  Normal range of motion. Cervical back: Normal range of motion and neck supple. Right lower leg: No edema. Left lower leg: No edema. Comments: Left leg shortened and externally rotated, tender over the left hip   Skin:     General: Skin is warm and dry. Capillary Refill: Capillary refill takes less than 2 seconds. Neurological:      Mental Status: She is alert. Mental status is at baseline. Psychiatric:         Mood and Affect: Mood normal.         Behavior: Behavior normal.         DIFFERENTIAL DIAGNOSIS/ MDM:     Left hip fracture. Plan to transfer to [facility]. DIAGNOSTIC RESULTS     EKG: All EKG's are interpreted by the Emergency Department Physician who either signs or Co-signs this chart in the absence of a cardiologist.    EKG Interpretation    Interpreted by emergency department physician    Rhythm: normal sinus   Rate: normal  Axis: left  Ectopy: none  Conduction: normal  ST Segments: nonspecific changes  T Waves: non specific changes  Q Waves: none    Clinical Impression: non-specific EKG    Chuck Almanza MD      RADIOLOGY:        Interpretation per the Radiologist below, if available at the time of this note:    CT HEAD WO CONTRAST    Result Date: 11/4/2021  EXAMINATION: CT OF THE HEAD WITHOUT CONTRAST; CT OF THE CERVICAL SPINE WITHOUT CONTRAST 11/4/2021 1:39 pm TECHNIQUE: CT of the head was performed without the administration of intravenous contrast. Dose modulation, iterative reconstruction, and/or weight based adjustment of the mA/kV was utilized to reduce the radiation dose to as low as reasonably achievable.; CT of the cervical spine was performed without the administration of intravenous contrast. Multiplanar reformatted images are provided for review. Dose modulation, iterative reconstruction, and/or weight based adjustment of the mA/kV was utilized to reduce the radiation dose to as low as reasonably achievable.  COMPARISON: None HISTORY: ORDERING SYSTEM PROVIDED HISTORY: fall TECHNOLOGIST PROVIDED HISTORY: fall Reason for Exam: fall Acuity: Acute Type of Exam: Initial; ORDERING SYSTEM PROVIDED HISTORY: fall TECHNOLOGIST PROVIDED HISTORY: fall Decision Support Exception - unselect if not a suspected or confirmed emergency medical condition->Emergency Medical Condition (MA) Reason for Exam: fall Acuity: Acute Type of Exam: Initial FINDINGS: CT head: BRAIN/VENTRICLES: There is no acute intracranial hemorrhage, mass effect or midline shift. No abnormal extra-axial fluid collection. The gray-white differentiation is maintained without evidence of an acute infarct. There is no evidence of hydrocephalus. Patient has scattered hypodensity in the white matter consistent with significant chronic white matter ischemia most significant in the left frontal lobe. Calcification in the basal ganglia noted. Calcification of the cavernous carotid arteries and vertebral arteries is present. ORBITS: The visualized portion of the orbits demonstrate no acute abnormality. SINUSES: The visualized paranasal sinuses and mastoid air cells demonstrate no acute abnormality. SOFT TISSUES/SKULL: No acute abnormality of the visualized skull or soft tissues. CT C-spine: BONES/ALIGNMENT: There is no acute fracture or traumatic malalignment. Mild dextroscoliotic curvature is noted. DEGENERATIVE CHANGES: The patient has severe degenerative and degenerative disc changes C5 through C7 with appearance of auto fusion at the C5-C6 and C6-C7 levels. Severe bilateral neural foraminal narrowing is present C6-C7 and C7-T1. Moderate atlantoaxial degenerative changes are present. The patient has considerable facet changes. Mild canal stenosis is present C6-C7. SOFT TISSUES: There is no prevertebral soft tissue swelling. Lung apices are unremarkable. CT head: No acute intracranial abnormality. Senescent changes including significant chronic microvascular change. Atherosclerotic disease.  CT C-spine: No acute fracture or traumatic malalignment. Patient has significant degenerative and degenerative disc disease more significant in the lower cervical spine with canal stenosis C6-C7 and severe neural foraminal narrowing as above. CT CERVICAL SPINE WO CONTRAST    Result Date: 11/4/2021  EXAMINATION: CT OF THE HEAD WITHOUT CONTRAST; CT OF THE CERVICAL SPINE WITHOUT CONTRAST 11/4/2021 1:39 pm TECHNIQUE: CT of the head was performed without the administration of intravenous contrast. Dose modulation, iterative reconstruction, and/or weight based adjustment of the mA/kV was utilized to reduce the radiation dose to as low as reasonably achievable.; CT of the cervical spine was performed without the administration of intravenous contrast. Multiplanar reformatted images are provided for review. Dose modulation, iterative reconstruction, and/or weight based adjustment of the mA/kV was utilized to reduce the radiation dose to as low as reasonably achievable. COMPARISON: None HISTORY: ORDERING SYSTEM PROVIDED HISTORY: fall TECHNOLOGIST PROVIDED HISTORY: fall Reason for Exam: fall Acuity: Acute Type of Exam: Initial; ORDERING SYSTEM PROVIDED HISTORY: fall TECHNOLOGIST PROVIDED HISTORY: fall Decision Support Exception - unselect if not a suspected or confirmed emergency medical condition->Emergency Medical Condition (MA) Reason for Exam: fall Acuity: Acute Type of Exam: Initial FINDINGS: CT head: BRAIN/VENTRICLES: There is no acute intracranial hemorrhage, mass effect or midline shift. No abnormal extra-axial fluid collection. The gray-white differentiation is maintained without evidence of an acute infarct. There is no evidence of hydrocephalus. Patient has scattered hypodensity in the white matter consistent with significant chronic white matter ischemia most significant in the left frontal lobe. Calcification in the basal ganglia noted. Calcification of the cavernous carotid arteries and vertebral arteries is present.  ORBITS: The visualized portion of the orbits demonstrate no acute abnormality. SINUSES: The visualized paranasal sinuses and mastoid air cells demonstrate no acute abnormality. SOFT TISSUES/SKULL: No acute abnormality of the visualized skull or soft tissues. CT C-spine: BONES/ALIGNMENT: There is no acute fracture or traumatic malalignment. Mild dextroscoliotic curvature is noted. DEGENERATIVE CHANGES: The patient has severe degenerative and degenerative disc changes C5 through C7 with appearance of auto fusion at the C5-C6 and C6-C7 levels. Severe bilateral neural foraminal narrowing is present C6-C7 and C7-T1. Moderate atlantoaxial degenerative changes are present. The patient has considerable facet changes. Mild canal stenosis is present C6-C7. SOFT TISSUES: There is no prevertebral soft tissue swelling. Lung apices are unremarkable. CT head: No acute intracranial abnormality. Senescent changes including significant chronic microvascular change. Atherosclerotic disease. CT C-spine: No acute fracture or traumatic malalignment. Patient has significant degenerative and degenerative disc disease more significant in the lower cervical spine with canal stenosis C6-C7 and severe neural foraminal narrowing as above. XR CHEST PORTABLE    Result Date: 11/4/2021  EXAMINATION: ONE XRAY VIEW OF THE CHEST 11/4/2021 12:47 pm COMPARISON: 07/30/2021. HISTORY: ORDERING SYSTEM PROVIDED HISTORY: fall TECHNOLOGIST PROVIDED HISTORY: fall Reason for Exam: Fall; left hip pain, limited movement Acuity: Acute Type of Exam: Initial FINDINGS: Tortuosity of the descending aorta was noted. Mild cardiomegaly was noted without pneumonia, interstitial edema or pleural effusions. Moderate degenerative osteo arthritic changes involve the right glenohumeral joint. Multiple metallic surgical clips were noted in the left upper abdominal quadrant. Post vertebroplasty changes involve T12 and L1.   No significant change has occurred from 07/30/2021. Mild cardiomegaly without pneumonia, interstitial edema or pleural effusions. No significant change from 07/30/2021. XR HIP 2-3 VW W PELVIS LEFT    Result Date: 11/4/2021  EXAMINATION: ONE XRAY VIEW OF THE PELVIS AND TWO XRAY VIEWS LEFT HIP 11/4/2021 1:47 pm COMPARISON: None. HISTORY: ORDERING SYSTEM PROVIDED HISTORY: fall hip pain TECHNOLOGIST PROVIDED HISTORY: fall hip pain Reason for Exam: Fall; left hip pain, limited movement Acuity: Acute Type of Exam: Initial FINDINGS: Intertrochanteric fracture left femoral neck. No dislocation. No other fractures.      Intertrochanteric fracture left femoral neck       LABS:  Results for orders placed or performed during the hospital encounter of 50/80/59   Basic Metabolic Panel   Result Value Ref Range    Glucose 106 (H) 70 - 99 mg/dL    BUN 12 8 - 23 mg/dL    CREATININE 0.54 0.50 - 0.90 mg/dL    Bun/Cre Ratio 22 (H) 9 - 20    Calcium 8.7 8.6 - 10.4 mg/dL    Sodium 139 135 - 144 mmol/L    Potassium 3.8 3.7 - 5.3 mmol/L    Chloride 103 98 - 107 mmol/L    CO2 27 20 - 31 mmol/L    Anion Gap 9 9 - 17 mmol/L    GFR Non-African American >60 >60 mL/min    GFR African American >60 >60 mL/min    GFR Comment          GFR Staging NOT REPORTED    CBC Auto Differential   Result Value Ref Range    WBC 9.5 3.5 - 11.3 k/uL    RBC 3.05 (L) 3.95 - 5.11 m/uL    Hemoglobin 8.9 (L) 11.9 - 15.1 g/dL    Hematocrit 29.2 (L) 36.3 - 47.1 %    MCV 95.7 82.6 - 102.9 fL    MCH 29.2 25.2 - 33.5 pg    MCHC 30.5 25.2 - 33.5 g/dL    RDW 19.6 (H) 11.8 - 14.4 %    Platelets 073 671 - 002 k/uL    MPV 11.8 8.1 - 13.5 fL    NRBC Automated 0.0 0.0 per 100 WBC    Differential Type NOT REPORTED     Seg Neutrophils 80 (H) 36 - 65 %    Lymphocytes 7 (L) 24 - 43 %    Monocytes 10 3 - 12 %    Eosinophils % 1 1 - 4 %    Basophils 1 0 - 2 %    Immature Granulocytes 1 (H) 0 %    Segs Absolute 7.73 1.50 - 8.10 k/uL    Absolute Lymph # 0.66 (L) 1.10 - 3.70 k/uL    Absolute Mono # 0.93 0.10 - 1.20 k/uL Absolute Eos # 0.06 0.00 - 0.44 k/uL    Basophils Absolute 0.05 0.00 - 0.20 k/uL    Absolute Immature Granulocyte 0.08 0.00 - 0.30 k/uL    WBC Morphology NOT REPORTED     RBC Morphology ANISOCYTOSIS PRESENT     Platelet Estimate NOT REPORTED    Protime-INR   Result Value Ref Range    Protime 25.6 (H) 11.5 - 14.2 sec    INR 2.5    Troponin   Result Value Ref Range    Troponin, High Sensitivity 15 (H) 0 - 14 ng/L    Troponin T NOT REPORTED <0.03 ng/mL    Troponin Interp NOT REPORTED        INR therapeutic. EMERGENCY DEPARTMENT COURSE:   Vitals:    Vitals:    11/04/21 1239 11/04/21 1418   BP:  (!) 125/51   Pulse: 68 67   Resp: 14 16   Temp: 98.4 °F (36.9 °C)    TempSrc: Tympanic    SpO2: 95% 98%   Height: 4' 11\" (1.499 m)      -------------------------  BP: (!) 125/51, Temp: 98.4 °F (36.9 °C), Pulse: 67, Resp: 16      RE-EVALUATION:  2:44 PM EDT  Patient is still uncomfortable. Additional pain meds ordered. ,    CONSULTS:  Orthopedic surgery  I discussed the patient with Dr Harry Christine. He recommends speaking with the Hospitalist regarding surgical risk. Internal Medicine  I discussed the patient with Dr Víctor Haile. He recommends transferring her to a tertiary care center for further management. Emergency Medicine  I discussed the patient with Dr Jeanette Guzman. He agrees to accept the patient in transfer. PROCEDURES:  None    FINAL IMPRESSION      1. Closed fracture of left hip, initial encounter [facility])          DISPOSITION/PLAN   DISPOSITION Decision To Admit 11/04/2021 02:46:55 PM      CONDITION ON DISPOSITION:   Stable     PATIENT REFERRED TO:  No follow-up provider specified.     DISCHARGE MEDICATIONS:  New Prescriptions    No medications on file       (Please note that portions of this note were completed with a voicerecognition program.  Efforts were made to edit the dictations but occasionally words are mis-transcribed.)    Toney Tapia MD  Attending Emergency Medicine Physician       Toney Tapia MD  11/04/21 7732 [address],[facility], MD  11/04/21 5853

## 2021-11-04 NOTE — ED NOTES
Mechanical fall    Left hip fracture    78year old female    No other traumatic injuries    Accepted by Dr. Veronika Mireles, RN  11/04/21 3475

## 2021-11-05 ENCOUNTER — ANESTHESIA EVENT (OUTPATIENT)
Dept: OPERATING ROOM | Age: 81
DRG: 481 | End: 2021-11-05
Payer: MEDICARE

## 2021-11-05 ENCOUNTER — APPOINTMENT (OUTPATIENT)
Dept: GENERAL RADIOLOGY | Age: 81
DRG: 481 | End: 2021-11-05
Payer: MEDICARE

## 2021-11-05 ENCOUNTER — ANESTHESIA (OUTPATIENT)
Dept: OPERATING ROOM | Age: 81
DRG: 481 | End: 2021-11-05
Payer: MEDICARE

## 2021-11-05 VITALS — DIASTOLIC BLOOD PRESSURE: 53 MMHG | TEMPERATURE: 99.8 F | OXYGEN SATURATION: 100 % | SYSTOLIC BLOOD PRESSURE: 103 MMHG

## 2021-11-05 LAB
ABSOLUTE EOS #: 0.07 K/UL (ref 0–0.4)
ABSOLUTE IMMATURE GRANULOCYTE: 0 K/UL (ref 0–0.3)
ABSOLUTE LYMPH #: 1.22 K/UL (ref 1–4.8)
ABSOLUTE MONO #: 0.54 K/UL (ref 0.1–0.8)
ALLEN TEST: ABNORMAL
ANION GAP SERPL CALCULATED.3IONS-SCNC: 8 MMOL/L (ref 9–17)
BASOPHILS # BLD: 1 % (ref 0–2)
BASOPHILS ABSOLUTE: 0.07 K/UL (ref 0–0.2)
BLOOD BANK SPECIMEN: NORMAL
BUN BLDV-MCNC: 10 MG/DL (ref 8–23)
BUN/CREAT BLD: ABNORMAL (ref 9–20)
CALCIUM SERPL-MCNC: 7.8 MG/DL (ref 8.6–10.4)
CARBOXYHEMOGLOBIN: 1.1 % (ref 0–5)
CHLORIDE BLD-SCNC: 104 MMOL/L (ref 98–107)
CO2: 26 MMOL/L (ref 20–31)
CREAT SERPL-MCNC: 0.61 MG/DL (ref 0.5–0.9)
DIFFERENTIAL TYPE: ABNORMAL
EOSINOPHILS RELATIVE PERCENT: 1 % (ref 1–4)
FIO2: ABNORMAL
GFR AFRICAN AMERICAN: >60 ML/MIN
GFR NON-AFRICAN AMERICAN: >60 ML/MIN
GFR SERPL CREATININE-BSD FRML MDRD: ABNORMAL ML/MIN/{1.73_M2}
GFR SERPL CREATININE-BSD FRML MDRD: ABNORMAL ML/MIN/{1.73_M2}
GLUCOSE BLD-MCNC: 91 MG/DL (ref 70–99)
HCO3 ARTERIAL: 27.4 MMOL/L (ref 22–27)
HCT VFR BLD CALC: 24.5 % (ref 36.3–47.1)
HCT VFR BLD CALC: 26.4 %
HCT VFR BLD CALC: 27 % (ref 36.3–47.1)
HEMOGLOBIN: 7.2 G/DL (ref 11.9–15.1)
HEMOGLOBIN: 7.8 G/DL (ref 11.9–15.1)
HEMOGLOBIN: 8.5 GM/DL
IMMATURE GRANULOCYTES: 0 %
INR BLD: 1.2
INR BLD: 1.6
LV EF: 66 %
LVEF MODALITY: NORMAL
LYMPHOCYTES # BLD: 18 % (ref 24–44)
MCH RBC QN AUTO: 28.4 PG (ref 25.2–33.5)
MCHC RBC AUTO-ENTMCNC: 28.9 G/DL (ref 28.4–34.8)
MCV RBC AUTO: 98.2 FL (ref 82.6–102.9)
METHEMOGLOBIN: ABNORMAL % (ref 0–1.5)
MODE: ABNORMAL
MONOCYTES # BLD: 8 % (ref 1–7)
MORPHOLOGY: ABNORMAL
NEGATIVE BASE EXCESS, ART: ABNORMAL MMOL/L (ref 0–2)
NOTIFICATION TIME: ABNORMAL
NOTIFICATION: ABNORMAL
NRBC AUTOMATED: 0 PER 100 WBC
O2 DEVICE/FLOW/%: ABNORMAL
O2 SAT, ARTERIAL: 27 % (ref 94–100)
OXYHEMOGLOBIN: ABNORMAL % (ref 95–98)
PATIENT TEMP: 37
PCO2 ARTERIAL: 46.7 MMHG (ref 32–45)
PCO2, ART, TEMP ADJ: ABNORMAL (ref 32–45)
PDW BLD-RTO: 19.4 % (ref 11.8–14.4)
PEEP/CPAP: ABNORMAL
PH ARTERIAL: 7.39 (ref 7.35–7.45)
PH, ART, TEMP ADJ: ABNORMAL (ref 7.35–7.45)
PLATELET # BLD: 168 K/UL (ref 138–453)
PLATELET ESTIMATE: ABNORMAL
PMV BLD AUTO: 12.5 FL (ref 8.1–13.5)
PO2 ARTERIAL: 18.1 MMHG (ref 75–95)
PO2, ART, TEMP ADJ: ABNORMAL MMHG (ref 75–95)
POSITIVE BASE EXCESS, ART: 2.6 MMOL/L (ref 0–2)
POTASSIUM SERPL-SCNC: 4 MMOL/L (ref 3.7–5.3)
PROTHROMBIN TIME: 12.9 SEC (ref 9.1–12.3)
PROTHROMBIN TIME: 16.7 SEC (ref 9.1–12.3)
PSV: ABNORMAL
PT. POSITION: ABNORMAL
RBC # BLD: 2.75 M/UL (ref 3.95–5.11)
RBC # BLD: ABNORMAL 10*6/UL
RESPIRATORY RATE: ABNORMAL
SAMPLE SITE: ABNORMAL
SEG NEUTROPHILS: 72 % (ref 36–66)
SEGMENTED NEUTROPHILS ABSOLUTE COUNT: 4.9 K/UL (ref 1.8–7.7)
SET RATE: ABNORMAL
SODIUM BLD-SCNC: 138 MMOL/L (ref 135–144)
TEXT FOR RESPIRATORY: ABNORMAL
TOTAL HB: ABNORMAL G/DL (ref 12–16)
TOTAL RATE: ABNORMAL
VT: ABNORMAL
WBC # BLD: 6.8 K/UL (ref 3.5–11.3)
WBC # BLD: ABNORMAL 10*3/UL

## 2021-11-05 PROCEDURE — 86850 RBC ANTIBODY SCREEN: CPT

## 2021-11-05 PROCEDURE — 73590 X-RAY EXAM OF LOWER LEG: CPT

## 2021-11-05 PROCEDURE — 0QS736Z REPOSITION LEFT UPPER FEMUR WITH INTRAMEDULLARY INTERNAL FIXATION DEVICE, PERCUTANEOUS APPROACH: ICD-10-PCS | Performed by: ORTHOPAEDIC SURGERY

## 2021-11-05 PROCEDURE — 2500000003 HC RX 250 WO HCPCS: Performed by: ANESTHESIOLOGY

## 2021-11-05 PROCEDURE — 2580000003 HC RX 258: Performed by: STUDENT IN AN ORGANIZED HEALTH CARE EDUCATION/TRAINING PROGRAM

## 2021-11-05 PROCEDURE — 2W6PXZZ TRACTION OF LEFT UPPER LEG: ICD-10-PCS | Performed by: ORTHOPAEDIC SURGERY

## 2021-11-05 PROCEDURE — 6360000002 HC RX W HCPCS: Performed by: NURSE ANESTHETIST, CERTIFIED REGISTERED

## 2021-11-05 PROCEDURE — 82805 BLOOD GASES W/O2 SATURATION: CPT

## 2021-11-05 PROCEDURE — 7100000001 HC PACU RECOVERY - ADDTL 15 MIN: Performed by: ORTHOPAEDIC SURGERY

## 2021-11-05 PROCEDURE — 94761 N-INVAS EAR/PLS OXIMETRY MLT: CPT

## 2021-11-05 PROCEDURE — 80048 BASIC METABOLIC PNL TOTAL CA: CPT

## 2021-11-05 PROCEDURE — 6360000002 HC RX W HCPCS: Performed by: ANESTHESIOLOGY

## 2021-11-05 PROCEDURE — 6370000000 HC RX 637 (ALT 250 FOR IP): Performed by: STUDENT IN AN ORGANIZED HEALTH CARE EDUCATION/TRAINING PROGRAM

## 2021-11-05 PROCEDURE — 93306 TTE W/DOPPLER COMPLETE: CPT

## 2021-11-05 PROCEDURE — 6360000002 HC RX W HCPCS

## 2021-11-05 PROCEDURE — 86900 BLOOD TYPING SEROLOGIC ABO: CPT

## 2021-11-05 PROCEDURE — 76942 ECHO GUIDE FOR BIOPSY: CPT | Performed by: ANESTHESIOLOGY

## 2021-11-05 PROCEDURE — 2720000010 HC SURG SUPPLY STERILE: Performed by: ORTHOPAEDIC SURGERY

## 2021-11-05 PROCEDURE — 85018 HEMOGLOBIN: CPT

## 2021-11-05 PROCEDURE — 27245 TREAT THIGH FRACTURE: CPT | Performed by: ORTHOPAEDIC SURGERY

## 2021-11-05 PROCEDURE — 2500000003 HC RX 250 WO HCPCS: Performed by: NURSE ANESTHETIST, CERTIFIED REGISTERED

## 2021-11-05 PROCEDURE — 85610 PROTHROMBIN TIME: CPT

## 2021-11-05 PROCEDURE — 2580000003 HC RX 258: Performed by: ORTHOPAEDIC SURGERY

## 2021-11-05 PROCEDURE — 73552 X-RAY EXAM OF FEMUR 2/>: CPT

## 2021-11-05 PROCEDURE — 2700000000 HC OXYGEN THERAPY PER DAY

## 2021-11-05 PROCEDURE — 2709999900 HC NON-CHARGEABLE SUPPLY: Performed by: ORTHOPAEDIC SURGERY

## 2021-11-05 PROCEDURE — 3600000004 HC SURGERY LEVEL 4 BASE: Performed by: ORTHOPAEDIC SURGERY

## 2021-11-05 PROCEDURE — 6360000002 HC RX W HCPCS: Performed by: STUDENT IN AN ORGANIZED HEALTH CARE EDUCATION/TRAINING PROGRAM

## 2021-11-05 PROCEDURE — 3E0T3BZ INTRODUCTION OF ANESTHETIC AGENT INTO PERIPHERAL NERVES AND PLEXI, PERCUTANEOUS APPROACH: ICD-10-PCS | Performed by: ANESTHESIOLOGY

## 2021-11-05 PROCEDURE — 86920 COMPATIBILITY TEST SPIN: CPT

## 2021-11-05 PROCEDURE — 73502 X-RAY EXAM HIP UNI 2-3 VIEWS: CPT

## 2021-11-05 PROCEDURE — 7100000000 HC PACU RECOVERY - FIRST 15 MIN: Performed by: ORTHOPAEDIC SURGERY

## 2021-11-05 PROCEDURE — 2580000003 HC RX 258

## 2021-11-05 PROCEDURE — 3600000014 HC SURGERY LEVEL 4 ADDTL 15MIN: Performed by: ORTHOPAEDIC SURGERY

## 2021-11-05 PROCEDURE — 3700000001 HC ADD 15 MINUTES (ANESTHESIA): Performed by: ORTHOPAEDIC SURGERY

## 2021-11-05 PROCEDURE — 73562 X-RAY EXAM OF KNEE 3: CPT

## 2021-11-05 PROCEDURE — 86901 BLOOD TYPING SEROLOGIC RH(D): CPT

## 2021-11-05 PROCEDURE — 85014 HEMATOCRIT: CPT

## 2021-11-05 PROCEDURE — C1713 ANCHOR/SCREW BN/BN,TIS/BN: HCPCS | Performed by: ORTHOPAEDIC SURGERY

## 2021-11-05 PROCEDURE — 85025 COMPLETE CBC W/AUTO DIFF WBC: CPT

## 2021-11-05 PROCEDURE — 3209999900 FLUORO FOR SURGICAL PROCEDURES

## 2021-11-05 PROCEDURE — 3700000000 HC ANESTHESIA ATTENDED CARE: Performed by: ORTHOPAEDIC SURGERY

## 2021-11-05 PROCEDURE — 2580000003 HC RX 258: Performed by: NURSE ANESTHETIST, CERTIFIED REGISTERED

## 2021-11-05 PROCEDURE — 73610 X-RAY EXAM OF ANKLE: CPT

## 2021-11-05 PROCEDURE — 36415 COLL VENOUS BLD VENIPUNCTURE: CPT

## 2021-11-05 PROCEDURE — 1200000000 HC SEMI PRIVATE

## 2021-11-05 DEVICE — INTERTAN LAG/COMPRESSION SCREW KIT                                    80MM / 75MM
Type: IMPLANTABLE DEVICE | Site: HIP | Status: FUNCTIONAL
Brand: TRIGEN

## 2021-11-05 DEVICE — TRIGEN INTERTAN 10S 10MM X 38CM 125DEGREE LEFT
Type: IMPLANTABLE DEVICE | Site: HIP | Status: FUNCTIONAL
Brand: TRIGEN

## 2021-11-05 DEVICE — TRIGEN LOW PROFILE SCREW 5.0MM X 42.5MM
Type: IMPLANTABLE DEVICE | Site: HIP | Status: FUNCTIONAL
Brand: TRIGEN

## 2021-11-05 RX ORDER — LABETALOL HYDROCHLORIDE 5 MG/ML
5 INJECTION, SOLUTION INTRAVENOUS EVERY 10 MIN PRN
Status: DISCONTINUED | OUTPATIENT
Start: 2021-11-05 | End: 2021-11-05 | Stop reason: HOSPADM

## 2021-11-05 RX ORDER — ONDANSETRON 2 MG/ML
INJECTION INTRAMUSCULAR; INTRAVENOUS PRN
Status: DISCONTINUED | OUTPATIENT
Start: 2021-11-05 | End: 2021-11-05 | Stop reason: SDUPTHER

## 2021-11-05 RX ORDER — SODIUM CHLORIDE 9 MG/ML
INJECTION, SOLUTION INTRAVENOUS PRN
Status: DISCONTINUED | OUTPATIENT
Start: 2021-11-05 | End: 2021-11-05

## 2021-11-05 RX ORDER — VITAMIN B COMPLEX
1000 TABLET ORAL 2 TIMES DAILY
Status: DISCONTINUED | OUTPATIENT
Start: 2021-11-05 | End: 2021-11-07 | Stop reason: HOSPADM

## 2021-11-05 RX ORDER — LIDOCAINE HYDROCHLORIDE 10 MG/ML
1 INJECTION, SOLUTION EPIDURAL; INFILTRATION; INTRACAUDAL; PERINEURAL
Status: DISCONTINUED | OUTPATIENT
Start: 2021-11-05 | End: 2021-11-05 | Stop reason: HOSPADM

## 2021-11-05 RX ORDER — OXYCODONE HYDROCHLORIDE AND ACETAMINOPHEN 5; 325 MG/1; MG/1
1 TABLET ORAL EVERY 4 HOURS PRN
Status: DISCONTINUED | OUTPATIENT
Start: 2021-11-05 | End: 2021-11-05 | Stop reason: HOSPADM

## 2021-11-05 RX ORDER — FUROSEMIDE 20 MG/1
20 TABLET ORAL DAILY PRN
Status: DISCONTINUED | OUTPATIENT
Start: 2021-11-05 | End: 2021-11-07 | Stop reason: HOSPADM

## 2021-11-05 RX ORDER — DEXAMETHASONE SODIUM PHOSPHATE 10 MG/ML
INJECTION INTRAMUSCULAR; INTRAVENOUS PRN
Status: DISCONTINUED | OUTPATIENT
Start: 2021-11-05 | End: 2021-11-05 | Stop reason: SDUPTHER

## 2021-11-05 RX ORDER — SODIUM CHLORIDE 0.9 % (FLUSH) 0.9 %
5-40 SYRINGE (ML) INJECTION PRN
Status: DISCONTINUED | OUTPATIENT
Start: 2021-11-05 | End: 2021-11-05 | Stop reason: HOSPADM

## 2021-11-05 RX ORDER — CITALOPRAM 20 MG/1
20 TABLET ORAL DAILY
Status: DISCONTINUED | OUTPATIENT
Start: 2021-11-05 | End: 2021-11-07 | Stop reason: HOSPADM

## 2021-11-05 RX ORDER — FENTANYL CITRATE 50 UG/ML
INJECTION, SOLUTION INTRAMUSCULAR; INTRAVENOUS
Status: COMPLETED
Start: 2021-11-05 | End: 2021-11-05

## 2021-11-05 RX ORDER — FENTANYL CITRATE 50 UG/ML
50 INJECTION, SOLUTION INTRAMUSCULAR; INTRAVENOUS EVERY 5 MIN PRN
Status: DISCONTINUED | OUTPATIENT
Start: 2021-11-05 | End: 2021-11-05 | Stop reason: HOSPADM

## 2021-11-05 RX ORDER — GABAPENTIN 100 MG/1
200 CAPSULE ORAL 3 TIMES DAILY
Status: DISCONTINUED | OUTPATIENT
Start: 2021-11-05 | End: 2021-11-07 | Stop reason: HOSPADM

## 2021-11-05 RX ORDER — HYDRALAZINE HYDROCHLORIDE 20 MG/ML
5 INJECTION INTRAMUSCULAR; INTRAVENOUS EVERY 10 MIN PRN
Status: DISCONTINUED | OUTPATIENT
Start: 2021-11-05 | End: 2021-11-05 | Stop reason: HOSPADM

## 2021-11-05 RX ORDER — FENTANYL CITRATE 50 UG/ML
25 INJECTION, SOLUTION INTRAMUSCULAR; INTRAVENOUS EVERY 5 MIN PRN
Status: DISCONTINUED | OUTPATIENT
Start: 2021-11-05 | End: 2021-11-05 | Stop reason: HOSPADM

## 2021-11-05 RX ORDER — CEFAZOLIN SODIUM 2 G/50ML
SOLUTION INTRAVENOUS PRN
Status: DISCONTINUED | OUTPATIENT
Start: 2021-11-05 | End: 2021-11-05 | Stop reason: SDUPTHER

## 2021-11-05 RX ORDER — MIDAZOLAM HYDROCHLORIDE 1 MG/ML
INJECTION INTRAMUSCULAR; INTRAVENOUS
Status: COMPLETED
Start: 2021-11-05 | End: 2021-11-05

## 2021-11-05 RX ORDER — AMIODARONE HYDROCHLORIDE 200 MG/1
200 TABLET ORAL DAILY
Status: DISCONTINUED | OUTPATIENT
Start: 2021-11-05 | End: 2021-11-07 | Stop reason: HOSPADM

## 2021-11-05 RX ORDER — ROCURONIUM BROMIDE 10 MG/ML
INJECTION, SOLUTION INTRAVENOUS PRN
Status: DISCONTINUED | OUTPATIENT
Start: 2021-11-05 | End: 2021-11-05 | Stop reason: SDUPTHER

## 2021-11-05 RX ORDER — MAGNESIUM HYDROXIDE 1200 MG/15ML
LIQUID ORAL PRN
Status: DISCONTINUED | OUTPATIENT
Start: 2021-11-05 | End: 2021-11-05 | Stop reason: ALTCHOICE

## 2021-11-05 RX ORDER — PROPOFOL 10 MG/ML
INJECTION, EMULSION INTRAVENOUS PRN
Status: DISCONTINUED | OUTPATIENT
Start: 2021-11-05 | End: 2021-11-05 | Stop reason: SDUPTHER

## 2021-11-05 RX ORDER — GLYCOPYRROLATE 1 MG/5 ML
SYRINGE (ML) INTRAVENOUS PRN
Status: DISCONTINUED | OUTPATIENT
Start: 2021-11-05 | End: 2021-11-05 | Stop reason: SDUPTHER

## 2021-11-05 RX ORDER — PHENYLEPHRINE HYDROCHLORIDE 10 MG/ML
INJECTION INTRAVENOUS PRN
Status: DISCONTINUED | OUTPATIENT
Start: 2021-11-05 | End: 2021-11-05 | Stop reason: SDUPTHER

## 2021-11-05 RX ORDER — MIDAZOLAM HYDROCHLORIDE 2 MG/2ML
1 INJECTION, SOLUTION INTRAMUSCULAR; INTRAVENOUS ONCE
Status: DISCONTINUED | OUTPATIENT
Start: 2021-11-05 | End: 2021-11-05 | Stop reason: HOSPADM

## 2021-11-05 RX ORDER — OXYCODONE HYDROCHLORIDE 5 MG/1
2.5 TABLET ORAL EVERY 4 HOURS PRN
Status: DISCONTINUED | OUTPATIENT
Start: 2021-11-05 | End: 2021-11-07 | Stop reason: HOSPADM

## 2021-11-05 RX ORDER — SODIUM CHLORIDE, SODIUM LACTATE, POTASSIUM CHLORIDE, CALCIUM CHLORIDE 600; 310; 30; 20 MG/100ML; MG/100ML; MG/100ML; MG/100ML
INJECTION, SOLUTION INTRAVENOUS CONTINUOUS
Status: DISCONTINUED | OUTPATIENT
Start: 2021-11-05 | End: 2021-11-05

## 2021-11-05 RX ORDER — FENTANYL CITRATE 50 UG/ML
INJECTION, SOLUTION INTRAMUSCULAR; INTRAVENOUS PRN
Status: DISCONTINUED | OUTPATIENT
Start: 2021-11-05 | End: 2021-11-05 | Stop reason: SDUPTHER

## 2021-11-05 RX ORDER — MAGNESIUM HYDROXIDE 1200 MG/15ML
LIQUID ORAL CONTINUOUS PRN
Status: COMPLETED | OUTPATIENT
Start: 2021-11-05 | End: 2021-11-05

## 2021-11-05 RX ORDER — LIDOCAINE HYDROCHLORIDE 10 MG/ML
INJECTION, SOLUTION EPIDURAL; INFILTRATION; INTRACAUDAL; PERINEURAL PRN
Status: DISCONTINUED | OUTPATIENT
Start: 2021-11-05 | End: 2021-11-05 | Stop reason: SDUPTHER

## 2021-11-05 RX ORDER — SODIUM CHLORIDE 9 MG/ML
INJECTION, SOLUTION INTRAVENOUS CONTINUOUS PRN
Status: DISCONTINUED | OUTPATIENT
Start: 2021-11-05 | End: 2021-11-05

## 2021-11-05 RX ORDER — FENTANYL CITRATE 50 UG/ML
50 INJECTION, SOLUTION INTRAMUSCULAR; INTRAVENOUS ONCE
Status: COMPLETED | OUTPATIENT
Start: 2021-11-05 | End: 2021-11-05

## 2021-11-05 RX ORDER — ONDANSETRON 2 MG/ML
4 INJECTION INTRAMUSCULAR; INTRAVENOUS
Status: DISCONTINUED | OUTPATIENT
Start: 2021-11-05 | End: 2021-11-05 | Stop reason: HOSPADM

## 2021-11-05 RX ORDER — MIDAZOLAM HYDROCHLORIDE 2 MG/2ML
1 INJECTION, SOLUTION INTRAMUSCULAR; INTRAVENOUS EVERY 10 MIN PRN
Status: DISCONTINUED | OUTPATIENT
Start: 2021-11-05 | End: 2021-11-05 | Stop reason: HOSPADM

## 2021-11-05 RX ORDER — EPHEDRINE SULFATE/0.9% NACL/PF 50 MG/5 ML
SYRINGE (ML) INTRAVENOUS PRN
Status: DISCONTINUED | OUTPATIENT
Start: 2021-11-05 | End: 2021-11-05 | Stop reason: SDUPTHER

## 2021-11-05 RX ORDER — SODIUM CHLORIDE 0.9 % (FLUSH) 0.9 %
5-40 SYRINGE (ML) INJECTION EVERY 12 HOURS SCHEDULED
Status: DISCONTINUED | OUTPATIENT
Start: 2021-11-05 | End: 2021-11-05 | Stop reason: HOSPADM

## 2021-11-05 RX ORDER — PROMETHAZINE HYDROCHLORIDE 25 MG/ML
6.25 INJECTION, SOLUTION INTRAMUSCULAR; INTRAVENOUS
Status: DISCONTINUED | OUTPATIENT
Start: 2021-11-05 | End: 2021-11-05 | Stop reason: HOSPADM

## 2021-11-05 RX ORDER — LANOLIN ALCOHOL/MO/W.PET/CERES
50 CREAM (GRAM) TOPICAL DAILY
Status: DISCONTINUED | OUTPATIENT
Start: 2021-11-05 | End: 2021-11-07 | Stop reason: HOSPADM

## 2021-11-05 RX ORDER — SODIUM CHLORIDE 9 MG/ML
25 INJECTION, SOLUTION INTRAVENOUS PRN
Status: DISCONTINUED | OUTPATIENT
Start: 2021-11-05 | End: 2021-11-05 | Stop reason: HOSPADM

## 2021-11-05 RX ORDER — SODIUM CHLORIDE, SODIUM LACTATE, POTASSIUM CHLORIDE, CALCIUM CHLORIDE 600; 310; 30; 20 MG/100ML; MG/100ML; MG/100ML; MG/100ML
INJECTION, SOLUTION INTRAVENOUS CONTINUOUS PRN
Status: DISCONTINUED | OUTPATIENT
Start: 2021-11-05 | End: 2021-11-05 | Stop reason: SDUPTHER

## 2021-11-05 RX ORDER — ERGOCALCIFEROL 1.25 MG/1
50000 CAPSULE ORAL WEEKLY
Qty: 8 CAPSULE | Refills: 0 | Status: SHIPPED | OUTPATIENT
Start: 2021-11-05 | End: 2022-02-21 | Stop reason: SDUPTHER

## 2021-11-05 RX ORDER — DIPHENHYDRAMINE HYDROCHLORIDE 50 MG/ML
12.5 INJECTION INTRAMUSCULAR; INTRAVENOUS
Status: DISCONTINUED | OUTPATIENT
Start: 2021-11-05 | End: 2021-11-05 | Stop reason: HOSPADM

## 2021-11-05 RX ORDER — 0.9 % SODIUM CHLORIDE 0.9 %
500 INTRAVENOUS SOLUTION INTRAVENOUS
Status: DISCONTINUED | OUTPATIENT
Start: 2021-11-05 | End: 2021-11-05 | Stop reason: HOSPADM

## 2021-11-05 RX ADMIN — DEXTROSE MONOHYDRATE 2000 MG: 50 INJECTION, SOLUTION INTRAVENOUS at 22:02

## 2021-11-05 RX ADMIN — OXYCODONE HYDROCHLORIDE 2.5 MG: 5 TABLET ORAL at 23:06

## 2021-11-05 RX ADMIN — FENTANYL CITRATE 50 MCG: 50 INJECTION INTRAMUSCULAR; INTRAVENOUS at 11:43

## 2021-11-05 RX ADMIN — FENTANYL CITRATE 25 MCG: 50 INJECTION, SOLUTION INTRAMUSCULAR; INTRAVENOUS at 11:54

## 2021-11-05 RX ADMIN — GABAPENTIN 200 MG: 100 CAPSULE ORAL at 22:02

## 2021-11-05 RX ADMIN — FENTANYL CITRATE 50 MCG: 50 INJECTION, SOLUTION INTRAMUSCULAR; INTRAVENOUS at 11:43

## 2021-11-05 RX ADMIN — SODIUM CHLORIDE, POTASSIUM CHLORIDE, SODIUM LACTATE AND CALCIUM CHLORIDE: 600; 310; 30; 20 INJECTION, SOLUTION INTRAVENOUS at 00:08

## 2021-11-05 RX ADMIN — METOPROLOL TARTRATE 12.5 MG: 25 TABLET ORAL at 22:02

## 2021-11-05 RX ADMIN — FENTANYL CITRATE 25 MCG: 50 INJECTION, SOLUTION INTRAMUSCULAR; INTRAVENOUS at 15:36

## 2021-11-05 RX ADMIN — OXYCODONE HYDROCHLORIDE 2.5 MG: 5 TABLET ORAL at 08:36

## 2021-11-05 RX ADMIN — Medication 1000 UNITS: at 22:02

## 2021-11-05 RX ADMIN — OXYCODONE HYDROCHLORIDE 2.5 MG: 5 TABLET ORAL at 18:19

## 2021-11-05 RX ADMIN — ACETAMINOPHEN 1000 MG: 500 TABLET ORAL at 22:02

## 2021-11-05 RX ADMIN — FENTANYL CITRATE 25 MCG: 50 INJECTION, SOLUTION INTRAMUSCULAR; INTRAVENOUS at 15:24

## 2021-11-05 ASSESSMENT — PULMONARY FUNCTION TESTS
PIF_VALUE: 17
PIF_VALUE: 17
PIF_VALUE: 16
PIF_VALUE: 22
PIF_VALUE: 17
PIF_VALUE: 16
PIF_VALUE: 17
PIF_VALUE: 18
PIF_VALUE: 16
PIF_VALUE: 2
PIF_VALUE: 17
PIF_VALUE: 18
PIF_VALUE: 17
PIF_VALUE: 17
PIF_VALUE: 21
PIF_VALUE: 17
PIF_VALUE: 18
PIF_VALUE: 15
PIF_VALUE: 17
PIF_VALUE: 16
PIF_VALUE: 4
PIF_VALUE: 19
PIF_VALUE: 16
PIF_VALUE: 16
PIF_VALUE: 19
PIF_VALUE: 16
PIF_VALUE: 15
PIF_VALUE: 17
PIF_VALUE: 16
PIF_VALUE: 16
PIF_VALUE: 17
PIF_VALUE: 19
PIF_VALUE: 16
PIF_VALUE: 17
PIF_VALUE: 16
PIF_VALUE: 17
PIF_VALUE: 17
PIF_VALUE: 1
PIF_VALUE: 18
PIF_VALUE: 16
PIF_VALUE: 16
PIF_VALUE: 0
PIF_VALUE: 17
PIF_VALUE: 16
PIF_VALUE: 17
PIF_VALUE: 2
PIF_VALUE: 16
PIF_VALUE: 17
PIF_VALUE: 18
PIF_VALUE: 17
PIF_VALUE: 15
PIF_VALUE: 17
PIF_VALUE: 17
PIF_VALUE: 16
PIF_VALUE: 16
PIF_VALUE: 3
PIF_VALUE: 17
PIF_VALUE: 16
PIF_VALUE: 17
PIF_VALUE: 16
PIF_VALUE: 17
PIF_VALUE: 18
PIF_VALUE: 17
PIF_VALUE: 16
PIF_VALUE: 17
PIF_VALUE: 17
PIF_VALUE: 16
PIF_VALUE: 16
PIF_VALUE: 17
PIF_VALUE: 20
PIF_VALUE: 17
PIF_VALUE: 16
PIF_VALUE: 17
PIF_VALUE: 23
PIF_VALUE: 17
PIF_VALUE: 16
PIF_VALUE: 17
PIF_VALUE: 16
PIF_VALUE: 17
PIF_VALUE: 4
PIF_VALUE: 17
PIF_VALUE: 15
PIF_VALUE: 16
PIF_VALUE: 16
PIF_VALUE: 19
PIF_VALUE: 17
PIF_VALUE: 17
PIF_VALUE: 20
PIF_VALUE: 17
PIF_VALUE: 16
PIF_VALUE: 18
PIF_VALUE: 1
PIF_VALUE: 15
PIF_VALUE: 16
PIF_VALUE: 16
PIF_VALUE: 17
PIF_VALUE: 3
PIF_VALUE: 17
PIF_VALUE: 1
PIF_VALUE: 17
PIF_VALUE: 16
PIF_VALUE: 16
PIF_VALUE: 17
PIF_VALUE: 16
PIF_VALUE: 16
PIF_VALUE: 0
PIF_VALUE: 15
PIF_VALUE: 16
PIF_VALUE: 17
PIF_VALUE: 15
PIF_VALUE: 15
PIF_VALUE: 1
PIF_VALUE: 17
PIF_VALUE: 16
PIF_VALUE: 20
PIF_VALUE: 16
PIF_VALUE: 17
PIF_VALUE: 17
PIF_VALUE: 1
PIF_VALUE: 40
PIF_VALUE: 1
PIF_VALUE: 19
PIF_VALUE: 17
PIF_VALUE: 15
PIF_VALUE: 17
PIF_VALUE: 16
PIF_VALUE: 16
PIF_VALUE: 17
PIF_VALUE: 20
PIF_VALUE: 17

## 2021-11-05 ASSESSMENT — PAIN SCALES - GENERAL
PAINLEVEL_OUTOF10: 3
PAINLEVEL_OUTOF10: 0
PAINLEVEL_OUTOF10: 5
PAINLEVEL_OUTOF10: 8
PAINLEVEL_OUTOF10: 5
PAINLEVEL_OUTOF10: 4
PAINLEVEL_OUTOF10: 5
PAINLEVEL_OUTOF10: 0
PAINLEVEL_OUTOF10: 0
PAINLEVEL_OUTOF10: 3
PAINLEVEL_OUTOF10: 4
PAINLEVEL_OUTOF10: 8
PAINLEVEL_OUTOF10: 5
PAINLEVEL_OUTOF10: 8
PAINLEVEL_OUTOF10: 4
PAINLEVEL_OUTOF10: 5
PAINLEVEL_OUTOF10: 0

## 2021-11-05 ASSESSMENT — PAIN DESCRIPTION - DESCRIPTORS
DESCRIPTORS: ACHING;CONSTANT
DESCRIPTORS: ACHING;CONSTANT

## 2021-11-05 ASSESSMENT — PAIN DESCRIPTION - ORIENTATION
ORIENTATION: LEFT

## 2021-11-05 ASSESSMENT — PAIN DESCRIPTION - FREQUENCY
FREQUENCY: CONTINUOUS
FREQUENCY: CONTINUOUS

## 2021-11-05 ASSESSMENT — PAIN DESCRIPTION - LOCATION
LOCATION: HIP
LOCATION: HIP
LOCATION: LEG
LOCATION: LEG

## 2021-11-05 ASSESSMENT — PAIN DESCRIPTION - PAIN TYPE
TYPE: ACUTE PAIN
TYPE: SURGICAL PAIN
TYPE: SURGICAL PAIN

## 2021-11-05 ASSESSMENT — PAIN - FUNCTIONAL ASSESSMENT: PAIN_FUNCTIONAL_ASSESSMENT: 0-10

## 2021-11-05 NOTE — PROGRESS NOTES
PROGRESS NOTE          PATIENT NAME: Marcos Maciel  MEDICAL RECORD NO. [id number]  DATE: 2021  SURGEON: Dr. Duque Self: Hawa Taylor MD    HD: # 1    ASSESSMENT    Patient Active Problem List   Diagnosis    Compression fracture of T12 vertebra ([address]    Osteoporosis with pathological fracture    New onset atrial fibrillation (HCC)    Nonrheumatic mitral valve regurgitation    Essential hypertension    Normocytic normochromic anemia    Depression    Fall as cause of accidental injury at home as place of occurrence       [place] today with Dr. Amaya Hurley for L femur IMN  DVT Prophylaxis- hold chemical AC until POD1  Cardiac for afib management  Cardiac clearance before operative intervention   INR 1.6 (from 2.5, received VitK), recheck prior to surgery  Pain control with tylenol and gabapentin  Ancef on call to OR  Shaikh in place  Ice for pain/swelling  PT/OT eval after surgery, dispo planning from there      Chief Complaint: \"L hip pain\"    SUBJECTIVE    Marcos Maciel is is unchanged since yesterday. Pt was evaluated at bedside this morning. Currently she is resting in bed with complaints of pain in her left hip, but does not to appear to be in apparent distress. She presented to the ED after falling from standing height and sustaining a left intertrochanteric femur fracture. Patient is currently on warfarin. On presentation her INR was 2.5, but now is 1.6 s/p VitK. Patient is currently NPO. Denies nausea or vomiting. Denies any abdominal pain. No guarding or rebound tenderness noted.  She will be taken to surgery today for left femur IMN with Dr. Amaya Hurley approx at [facility]: Temp: Temp: 100 °F (37.8 °C)Temp  Av.2 °F (37.3 °C)  Min: 98.4 °F (36.9 °C)  Max: 100 °F (07.0 °C) BP Systolic (60UNL), MCY:598 , Min:113 , KCI:867   Diastolic (48HMQ), JIV:48, Min:45, Max:84   Pulse Pulse  Av.7  Min: 52  Max: 68 Resp Resp  Av.8 Min: 14  Max: 29 Pulse ox SpO2  Av.5 %  Min: 81 %  Max: 100 %  GENERAL: alert, no distress  NEURO: AAOX x 3  HEENT: No abrasions, lacerations  : Shaikh in  LUNGS: clear to ausculation, without wheezes, rales or rhonci  HEART: normal rate and regular rhythm  ABDOMEN: soft, non-tender, non-distended, bowel sounds present in all 4 quadrants and no guarding or peritoneal signs present  EXTREMITY: no cyanosis, clubbing or edema. Shortened and external rotation left leg. Tenderness to palpation about the left hip. Compartments soft and compressible. NV intact. DP/PT pulse 1+. EPC currently applied. No intake/output data recorded. Drain/tube output:  No intake/output data recorded. LAB:  CBC:   Recent Labs     21  1301   WBC 9.5   HGB 8.9*   HCT 29.2*   MCV 95.7        BMP:   Recent Labs     21  1301      K 3.8      CO2 27   BUN 12   CREATININE 0.54   GLUCOSE 106*     COAGS:   Recent Labs     21  1301 21  0231   INR 2.5 1.6       RADIOLOGY:  CT left hip  Impression   1. Left femoral intratrochanteric distracted, mildly impacted, comminuted   acute fracture which extends into and involves the base of the left femoral   neck. 2. 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 Left hip with pelvis XR  FINDINGS:   Intertrochanteric fracture left femoral neck. No dislocation. No other   fractures. CXR  Impression   Mild cardiomegaly without pneumonia, interstitial edema or pleural effusions. No significant change from 2021. CT cervical spine     Impression   CT head: No acute intracranial abnormality. Senescent changes including   significant chronic microvascular change. Atherosclerotic disease. CT C-spine: No acute fracture or traumatic malalignment.   Patient has   significant degenerative and degenerative disc disease more significant in   the lower cervical spine with canal stenosis C6-C7 and severe neural foraminal narrowing as above. CT head w/o contrast  Impression   CT head: No acute intracranial abnormality. Senescent changes including   significant chronic microvascular change. Atherosclerotic disease. CT C-spine: No acute fracture or traumatic malalignment. Patient has   significant degenerative and degenerative disc disease more significant in   the lower cervical spine with canal stenosis C6-C7 and severe neural   foraminal narrowing as above. Andrew Hassan DO  11/5/21, 5:43 AM        Attending Note    Awaiting cardiology evaluation prior to ORIF today. PT 12.9, INR 1.2 after vitamin K  I have reviewed the above TECSS note(s) and I either performed the key elements of the medical history and physical exam or was present with the resident when the key elements of the medical history and physical exam were performed. I have discussed the findings, established the care plan and recommendations with Resident, TECSS RN, bedside nurse.     Joseph Mariano MD  11/5/2021  8:16 AM

## 2021-11-05 NOTE — PROGRESS NOTES
Trauma Tertiary Survey    Admit Date: 11/4/2021  Hospital day 1    [facility]       Past Medical History:   Diagnosis Date    Chronic back pain     Neuropathy     Stroke ([address]     x2 in 2009 and x1 in 2010       Scheduled Meds:   amiodarone  200 mg Oral Daily    citalopram  20 mg Oral Daily    gabapentin  200 mg Oral TID    metoprolol tartrate  12.5 mg Oral BID    vitamin B-6  50 mg Oral Daily    Vitamin D  1,000 Units Oral BID    ceFAZolin  2,000 mg IntraVENous Q8H    sodium chloride flush  5-40 mL IntraVENous 2 times per day    polyethylene glycol  17 g Oral Daily    acetaminophen  1,000 mg Oral 3 times per day     Continuous Infusions:   lactated ringers 75 mL/hr at 11/05/21 0008     PRN Meds:furosemide, oxyCODONE, sodium chloride flush, ondansetron **OR** ondansetron, senna    Subjective:     Patient has complaints of left hip pain. Day 0 s/p left IMN femur . Pain is moderate, worsens with movement, and some relief by rest and pain meds. There is not associated numbness, tingling, weakness. Objective:     Patient Vitals for the past 8 hrs:   BP Temp Temp src Pulse Resp SpO2   11/05/21 1729      99 %   11/05/21 1615 (!) 118/57 97.9 °F (36.6 °C) Oral 85 16 97 %   11/05/21 1546 (!) 118/35   73 12 97 %   11/05/21 1530 (!) 122/47 97 °F (36.1 °C)  73 12 96 %   11/05/21 1515 (!) 137/55   76 13 96 %   11/05/21 1500 (!) 126/55   73 18 100 %   11/05/21 1440 (!) 125/58 97.8 °F (36.6 °C)  71 16 96 %   11/05/21 1145 (!) 119/53   71 18 98 %   11/05/21 1127 134/75 98.8 °F (37.1 °C) Temporal 73 10 100 %       I/O last 3 completed shifts:   In: 1750 [I.V.:1750]  Out: 80 [Urine:510; Blood:200]  I/O this shift:  In: 25 [I.V.:25]  Out: [address] [Urine:20]    Radiology:     11/5 1540  Interval performance of operative reduction and internal fixation of a   comminuted intratrochanteric fracture of the left hip placement of   intramedullary samantha and fixative pins.  Fracture alignment is satisfactory.     PHYSICAL EXAM:   GCS:  4 - Opens eyes on own   6 - Follows simple motor commands  5 - Alert and oriented    Pupil size:  Left 4 mm Right 4 mm  Pupil reaction: Yes  Wiggles fingers: Left Yes Right Yes  Hand grasp:   Left normal   Right normal  Wiggles toes: Left Yes    Right Yes  Plantar flexion: Left normal  Right normal      General appearance: alert, appears stated age and cooperative  Head: Normocephalic, without obvious abnormality, atraumatic  Eyes: conjunctivae/corneas clear. PERRL, EOM's intact. Fundi benign. Neck: no midline tenderness  Back: symmetric, no curvature. ROM normal. No CVA tenderness. Lungs: clear to auscultation bilaterally  Abdomen: soft, non-tender; bowel sounds normal; no masses,  no organomegaly  Pelvic: s/p IMN left femur.  Wound clean and dry    Spine:     Spine Tenderness ROM   Cervical 0 /10 Normal   Thoracic 0 /10 Normal   Lumbar 0 /10 Normal     Musculoskeletal    Joint Tenderness Swelling ROM   Right shoulder absent absent normal   Left shoulder absent absent normal   Right elbow absent absent normal   Left elbow absent absent normal   Right wrist absent absent normal   Left wrist absent absent normal   Right hand grasp absent absent normal   Left hand grasp absent absent normal   Right hip absent absent normal   Left hip present present Not tested, recent surgical procedure   Right knee absent absent normal   Left knee absent absent normal   Right ankle absent absent normal   Left ankle absent absent normal   Right foot absent absent normal   Left foot absent absent normal           CONSULTS: orthopedic surgery    PROCEDURES: left IMN femur    INJURIES:  Left femoral fracture      Patient Active Problem List   Diagnosis    Compression fracture of T12 vertebra (HCC)    Osteoporosis with pathological fracture    New onset atrial fibrillation (HCC)    Nonrheumatic mitral valve regurgitation    Essential hypertension    Normocytic normochromic anemia    Depression    Fall as cause of accidental injury at home as place of occurrence    Closed displaced intertrochanteric fracture of left femur ([facility] [address]         Assessment/Plan:     1. Tender to palpation left ankle, lt tib/fib, left knee, left ankle. 2. Will obtain plain films of tender areas.   3. Post op h/h    Electronically signed by Dragan Abraham DO on 11/5/2021 at 5:38 PM

## 2021-11-05 NOTE — CONSULTS
Codeine, and Pcn [penicillins]    Social History  Patient reports that she has never smoked. She has never used smokeless tobacco. She reports that she does not drink alcohol and does not use drugs. Family History  Patient family history is not on file. ROS:   General: - LOC. CV: No chest pain. Resp: No SOB. MSK: Left hip pain  Neuro: No numbness, tingling, weakness  10 point ROS negative other than stated above    PE:  Blood pressure 113/84, pulse 68, temperature 99.7 °F (37.6 °C), temperature source Oral, resp. rate 18, height 4' 11\" (1.499 m), weight 125 lb (56.7 kg), SpO2 99 %. Gen: Alert and oriented, NAD, cooperative. Head: Normocephalic, atraumatic. Cardiovascular: Rate regular. Respiratory: Chest symmetric, no accessory muscle use. Pelvis: Stable to anterior and lateral compression. RUE: Skin intact. No ecchymoses, abrasion, deformity, or lacerations. Non tender to palpation. No bony crepitus. Compartments soft and easily compressible. Ulnar/median/AIN/PIN/radial motor intact. Axillary/MCN/median/ulnar/radial nerves SILT. Radial pulse 2+ with BCR.    LUE: Skin intact. No ecchymoses, abrasion, deformity, or lacerations. Non tender to palpation. No bony crepitus. Compartments soft and easily compressible. Ulnar/median/AIN/PIN/radial motor intact. Axillary/MCN/median/ulnar/radial nerves SILT. Radial pulse 2+ with BCR. RLE: Skin intact. No ecchymoses, abrasions, deformity, or lacerations. Non tender to palpation. No bony crepitus. Compartments soft and easily compressible. EHL/FHL/TA/GS complex motor intact. Sural/saphenous/SPN/DPN/plantar nerve distribution SILT. Negative log roll. Able to perform straight leg raise. DP/PT pulses 1+ with BCR. LLE: Leg is shortened and externally rotated. Skin is intact, no open wounds noted. Pain with any motion of the hip. No TTP at the knee or proximal tibia. Mild TTP at the distal tibia and ankle joint. No TTP at the foot. Compartments soft and easily compressible. EHL/FHL/TA/GS complex motor intact. Sural/saphenous/SPN/DPN/plantar nerve distribution SILT. DP/PT pulses 1+ with BCR. Labs  Recent Labs     11/04/21  1301   WBC 9.5   HGB 8.9*   HCT 29.2*      INR 2.5      K 3.8   BUN 12   CREATININE 0.54   GLUCOSE 106*        Imaging   XR pelvis/left hip/femur: AP pelvis and AP/lateral of the left hip demonstrating a comminuted intertrochanteric femur fracture. There is diffuse osteopenia appreciated. There are no other acute fractures or dislocations noted. XR left ankle/tib-fib: No acute fractures or dislocations appreciated. Diffuse osteopenia noted.     Assessment/Plan: 78 y.o. female who is being seen for the following orthopaedic injuries:     - Left intertrochanteric femur fracture     - To OR tomorrow morning (11/5) for left hip intramedullary nail fixation  - Appreciate cardiac work-up in anticipation of surgery   - NWB to LLE  - NPO at midnight; consented; marked  - Hold chemical AC for surgery  - Ice for pain and swelling  - Remaining medical management per primary  - Dispo; will update accordingly following surgery    Electronically signed by Kristin Davis DO 10:45 PM 11/4/2021

## 2021-11-05 NOTE — CARE COORDINATION
Case Management Initial Discharge Plan  Nelida Esquivel,             Met with:patient to discuss discharge plans. Information verified: address, contacts, phone number, , insurance Yes  Insurance Provider: medicare    Emergency Contact/Next of Kin name & number: Ashley Harden [phone]  Who are involved in patient's support system? son    PCP: Connor Katz MD  Date of last visit: 1 wk      Discharge Planning    Living Arrangements:    lives alone son is over everyday to help    Home has 2 stories  4 stairs to climb to get into front door, 16stairs to climb to reach second floor  Location of bedroom/bathroom in home bed and bath main    Patient able to perform ADL's:Independent    Current Services (outpatient & in home) none  DME equipment: [employer] cane  DME provider:     Is patient receiving oral anticoagulation therapy? No    If indicated:   Physician managing anticoagulation treatment:   Where does patient obtain lab work for ATC treatment? Potential Assistance Needed:       Patient agreeable to home care: No  [place] of choice provided:  n/a    Prior SNF/Rehab Placement and Facility: none  Agreeable to SNF/Rehab: Yes  [place] of choice provided: yes  List provided   Evaluation: yes    Expected Discharge date:       Patient expects to be discharged to: If home: is the family and/or caregiver wiling & able to provide support at home? Son dtriaz savage  Who will be providing this support? Follow Up Appointment: Best Day/ Time:      Transportation provider: will need to snf  Transportation arrangements needed for discharge: Yes    Readmission Risk              Risk of Unplanned Readmission:  21             Does patient have a readmission risk score greater than 14?: Yes  If yes, follow-up appointment must be made within 7 days of discharge.      Goals of Care: decrease pain      Educated pt and son on transitional options, provided freedom of choice and are agreeable with plan      Discharge Plan: SNF referrals sent lives alone son visits daily at her home    #1 [facility] [place] [address] [place] [address]  #3 [facility]  Referrals sent      Electronically signed by Stephanie Copeland RN on 11/5/21 at 5:19 PM EDT

## 2021-11-05 NOTE — ANESTHESIA POSTPROCEDURE EVALUATION
Department of Anesthesiology  Postprocedure Note    Patient: Katherine Barfield  MRN: [id number]  YOB: 1940  Date of evaluation: 11/5/2021  Time:  2:54 PM     Procedure Summary     Date: 11/05/21 Room / Location: [facility] / [facility]    Anesthesia Start: 1150 Anesthesia Stop: 0043    Procedure: LEFT FEMUR  INTRAMEDULLARY NAILING, ORTIZ AND NEPHEW , C-ARM (Left Hip) Diagnosis: (LEFT IT FX.)    Surgeons: Julia Moser DO Responsible Provider: Neha Joseph MD    Anesthesia Type: general ASA Status: 3          Anesthesia Type: general    Beryl Phase I: Beryl Score: 9    Beryl Phase II:      Last vitals: Reviewed and per EMR flowsheets.        Anesthesia Post Evaluation    Patient location during evaluation: PACU  Patient participation: complete - patient participated  Level of consciousness: awake and alert  Airway patency: patent  Nausea & Vomiting: no nausea and no vomiting  Complications: no  Cardiovascular status: blood pressure returned to baseline  Respiratory status: acceptable  Hydration status: euvolemic

## 2021-11-05 NOTE — ANESTHESIA PRE PROCEDURE
Department of Anesthesiology  Preprocedure Note       Name:  Marcos Maciel   Age:  78 y.o.  :  1940                                          MRN:  [id number]         Date:  2021      Surgeon: Rodolfo Doty):  Sandy Wilkerson DO    Procedure: Procedure(s): *ADD-ON, WANTS 0900* INTRAMEDULLARY NAIL. FLAT DALE, SUPINE, C-ARM, TRAUMA TOOL BOX, TRACTION SET, ORTIZ AND NEPHEW NEEDS NOTIFIED    Medications prior to admission:   Prior to Admission medications    Medication Sig Start Date End Date Taking? Authorizing Provider   vitamin D (ERGOCALCIFEROL) 1.25 MG (44835 UT) CAPS capsule Take 1 capsule by mouth once a week for 8 doses 21 Yes Lokesh Iniguez DO   furosemide (LASIX) 20 MG tablet Take 1 tablet by mouth daily as needed (swelling, weight gain, sob) 21   Chance Andrade DO   amiodarone (CORDARONE) 200 MG tablet Take 1 tablet by mouth daily 8/3/21   Joselo Nair MD   metoprolol tartrate (LOPRESSOR) 25 MG tablet Take 0.5 tablets by mouth 2 times daily 8/3/21   Joselo Nair MD   warfarin (COUMADIN) 2 MG tablet Take 1 tablet by mouth daily Follow up with INR in 3 days 8/3/21   Joselo Nair MD   alendronate (FOSAMAX) 70 MG tablet Take 70 mg by mouth once a week 20  Historical Provider, MD   diphenhydrAMINE-APAP, sleep, (TYLENOL PM EXTRA STRENGTH)  MG tablet Take 1 tablet by mouth nightly as needed for Sleep    Historical Provider, MD   gabapentin (NEURONTIN) 100 MG capsule Take 2 capsules by mouth 3 times daily. 3/5/21   Historical Provider, MD   sodium chloride () 5 % ophthalmic solution Apply 1 drop to eye 3 times daily    Historical Provider, MD   acetaminophen (TYLENOL 8 HOUR ARTHRITIS PAIN) 650 MG extended release tablet Take 650 mg by mouth every 8 hours as needed for Pain    Historical Provider, MD   citalopram (CELEXA) 20 MG tablet Take 20 mg by mouth daily. Historical Provider, MD   b complex vitamins capsule Take 1 capsule by mouth daily.     Historical Provider, MD   Pyridoxine HCl (VITAMIN B-6) 50 MG tablet Take 50 mg by mouth daily. Historical Provider, MD   vitamin D3 (CHOLECALCIFEROL) 1000 UNITS TABS tablet Take 1,000 Units by mouth 2 times daily.     Historical Provider, MD   Alum Hydroxide-Mag Trisilicate (GAVISCON) 88-11.4 MG CHEW Take by mouth as needed     Historical Provider, MD       Current medications:    Current Facility-Administered Medications   Medication Dose Route Frequency Provider Last Rate Last Admin    amiodarone (CORDARONE) tablet 200 mg  200 mg Oral Daily [facility] - CNP        citalopram (CELEXA) tablet 20 mg  20 mg Oral Daily [facility], APRN - CNP        furosemide (LASIX) tablet 20 mg  20 mg Oral Daily PRN [facility] APR[facility] - CNP        gabapentin (NEURONTIN) capsule 200 mg  200 mg Oral TID [facility], APRN - CNP        metoprolol tartrate (LOPRESSOR) tablet 12.5 mg  12.5 mg Oral BID [facility], APRN - CNP        vitamin B-6 (PYRIDOXINE) tablet 50 mg  50 mg Oral Daily [facility], APRN - CNP        vitamin D (CHOLECALCIFEROL) tablet 1,000 Units  1,000 Units Oral BID [facility], APRN - CNP        oxyCODONE (ROXICODONE) immediate release tablet 2.5 mg  2.5 mg Oral Q4H PRN Hammond Phi, DO   2.5 mg at 11/05/21 0836    sodium chloride flush 0.9 % injection 5-40 mL  5-40 mL IntraVENous 2 times per day Renan Jeronimo, DO   10 mL at 11/04/21 2227    sodium chloride flush 0.9 % injection 5-40 mL  5-40 mL IntraVENous PRN Renan Jeronimo, DO        ondansetron (ZOFRAN-ODT) disintegrating tablet 4 mg  4 mg Oral Q8H PRN Renan Jeronimo, DO        Or    ondansetron (ZOFRAN) injection 4 mg  4 mg IntraVENous Q6H PRN Rnean Jeronimo, DO        polyethylene glycol (GLYCOLAX) packet 17 g  17 g Oral Daily Renan Jeronimo,         acetaminophen (TYLENOL) tablet 1,000 mg  1,000 mg Oral 3 times per day Renan Jeronimo, DO   1,000 mg at 11/04/21 2226    senna (SENOKOT) tablet 8.6 mg  1 tablet Oral Daily TIRSO PASCUAL Roberto Jobs, DO        lactated ringers infusion   IntraVENous Continuous Zoey Karma, DO 75 mL/hr at 11/05/21 0008 New Bag at 11/05/21 0008       Allergies: Allergies   Allergen Reactions    Asa [Aspirin] Nausea And Vomiting    Codeine Nausea And Vomiting    Pcn [Penicillins] Nausea And Vomiting       Problem List:    Patient Active Problem List   Diagnosis Code    Compression fracture of T12 vertebra ([facility] [address] S22.080A    Osteoporosis with pathological fracture M80.00XA    New onset atrial fibrillation (HCC) I48.91    Nonrheumatic mitral valve regurgitation I34.0    Essential hypertension I10    Normocytic normochromic anemia D64.9    Depression F32. A    Fall as cause of accidental injury at home as place of occurrence [address]. Atul Saldana, Y92.009       Past Medical History:        Diagnosis Date    Chronic back pain     Neuropathy     Stroke ([facility] [address]     x2 in 2009 and x1 in 2010       Past Surgical History:        Procedure Laterality Date   [address]    bowel resection for diverticulosis    APPENDECTOMY      FIXATION KYPHOPLASTY  7/3/14    T 12    HYSTERECTOMY      SPINE SURGERY N/A 5/10/2021    L2 ET L4 KYPHOPLASTY performed by Sharif Jean MD at [place] [address] History:    Social History     Tobacco Use    Smoking status: Never Smoker    Smokeless tobacco: Never Used   Substance Use Topics    Alcohol use:  No                                Counseling given: Not Answered      Vital Signs (Current):   Vitals:    11/04/21 2232 11/04/21 2300 11/05/21 0505 11/05/21 0730   BP: (!) 148/52  (!) 133/59 126/76   Pulse: 65  67 71   Resp: 29  20 18   Temp:  98.8 °F (37.1 °C) 100 °F (37.8 °C) 99.6 °F (37.6 °C)   TempSrc:  Oral Oral Oral   SpO2: 99%   97%   Weight:       Height:                                                  BP Readings from Last 3 Encounters:   11/05/21 126/76   11/04/21 135/81   08/16/21 108/62       NPO Status: BMI:   Wt Readings from Last 3 Encounters:   11/04/21 125 lb (56.7 kg)   08/16/21 111 lb (50.3 kg)   07/31/21 111 lb (50.3 kg)     Body mass index is 25.25 kg/m². CBC:   Lab Results   Component Value Date    WBC 6.8 11/05/2021    RBC 2.75 11/05/2021    HGB 7.8 11/05/2021    HCT 27.0 11/05/2021    MCV 98.2 11/05/2021    RDW 19.4 11/05/2021     11/05/2021       CMP:   Lab Results   Component Value Date     11/05/2021    K 4.0 11/05/2021     11/05/2021    CO2 26 11/05/2021    BUN 10 11/05/2021    CREATININE 0.61 11/05/2021    GFRAA >60 11/05/2021    LABGLOM >60 11/05/2021    GLUCOSE 91 11/05/2021    PROT 5.8 07/31/2021    CALCIUM 7.8 11/05/2021    BILITOT 1.27 07/31/2021    ALKPHOS 62 07/31/2021    AST 16 07/31/2021    ALT 8 07/31/2021       POC Tests: No results for input(s): POCGLU, POCNA, POCK, POCCL, POCBUN, POCHEMO, POCHCT in the last 72 hours.     Coags:   Lab Results   Component Value Date    PROTIME 12.9 11/05/2021    PROTIME 24 10/30/2021    INR 1.2 11/05/2021       HCG (If Applicable): No results found for: PREGTESTUR, PREGSERUM, HCG, HCGQUANT     ABGs: No results found for: PHART, PO2ART, HXF7XPS, CTT5WJO, BEART, I5GIHIWC     Type & Screen (If Applicable):  No results found for: LABABO, LABRH    Drug/Infectious Status (If Applicable):  No results found for: HIV, HEPCAB    COVID-19 Screening (If Applicable):   Lab Results   Component Value Date    COVID19 Not Detected 11/04/2021    COVID19 Not Detected 05/05/2021           Anesthesia Evaluation  Patient summary reviewed no history of anesthetic complications:   Airway: Mallampati: II  TM distance: >3 FB   Neck ROM: full  Mouth opening: > = 3 FB Dental:          Pulmonary:Negative Pulmonary ROS and normal exam                               Cardiovascular:    (+) hypertension: no interval change, dysrhythmias: atrial fibrillation,       ECG reviewed  Rhythm: irregular  Rate: abnormal  Echocardiogram reviewed Neuro/Psych:   (+) CVA: no interval change, psychiatric history:depression/anxiety             GI/Hepatic/Renal: Neg GI/Hepatic/Renal ROS            Endo/Other: Negative Endo/Other ROS   (+) blood dyscrasia: anemia:., .                 Abdominal:             Vascular: negative vascular ROS. Other Findings:             Anesthesia Plan      general     ASA 3       Induction: intravenous. Anesthetic plan and risks discussed with patient. Plan discussed with CRNA.                   Haylee Jose MD   11/5/2021

## 2021-11-05 NOTE — H&P
driveway on concrete    MECHANISM OF INJURY  []Motor Vehicle Collision  Specific vehicle type involved (e.g., sedan, minivan, SUV, pickup truck):   Collision with (e.g., type of vehicle, building, barn, ditch, tree):   []Single Vehicle Collision     []           []Fatality in Same Vehicle            []Passenger:      []Front Seat        []Rear Seat    []Unrestrained   []Lap Belt Only Restrained   [] Shoulder Belt Only Restrained  [] 3 Point Restrained    []Front Air Bag  []Side Air Bag  []Other Air Bag []Air Bag Not Deployed    []Ejected     []Rollover     []Extricated       CHILD:  []Booster Seat  []Infant Car Seat  [] Child Car Seat   []Motorcycle Collision Wearing Helmet     []Yes     []No    []Unknown  []Bicycle Collision Wearing Helmet     []Yes     []No    []Unknown  []Pedestrian Struck      [x]Fall    [x]From Standing     []From Height   Ft     []Down Stairs  []Assault  []Gunshot  Specify caliber / type of gun: ____________________________  []Stabbing  Specify weapon type, size: _____________________________  []Burn     []Flame   []Scald   []Electrical   []Chemical           []Contact   []Inhalation   []House fire  []Other ______________________________________________________  []Other protective devices used / worn ___________________________    HISTORY:   Suzie Seay while mailing a card outside home onto concrete. Fell onto butt. On ground for 1/2 hour. Son came and helped her. Did not trip or feel light headed and dizzy prior to fall, states she \"just went down\". Felt like her normal self after fall, but noticed left hip pain. Admits she has fallen before in her yard, but states no prior injuries from other falls. Uses a cane to walk. Did not hit head. Does not know if she takes blood thinners. Chart shows she takes warfarin at home. She has hx of afib and stroke in 2009 and in 2010, no lasting neurologic deficits. SHx of appendectomy and hysterectomy. Last meal yesterday , last bm today.  Does not smoke or drink alcohol. Allergic to penicillin, makes her dizzy. Xray shows left intertrochanteric hip fracture  CTHEAD and c-spine negative  Labs significant for INR of 2.5, Hg 8.9      Loss of Consciousness [x]No   []Yes Duration(min)   (If yes consult speech therapy)  Total Fluids Given Prior To Arrival  cc    MEDICATIONS:   []  None     []  Information not available due to exam limitations documented above  Prior to Admission medications    Medication Sig Start Date End Date Taking? Authorizing Provider   furosemide (LASIX) 20 MG tablet Take 1 tablet by mouth daily as needed (swelling, weight gain, sob) 8/16/21   Chance Andrade DO   amiodarone (CORDARONE) 200 MG tablet Take 1 tablet by mouth daily 8/3/21   Cece Suarez MD   metoprolol tartrate (LOPRESSOR) 25 MG tablet Take 0.5 tablets by mouth 2 times daily 8/3/21   Cece Suarez MD   warfarin (COUMADIN) 2 MG tablet Take 1 tablet by mouth daily Follow up with INR in 3 days 8/3/21   Cece Suarez MD   alendronate (FOSAMAX) 70 MG tablet Take 70 mg by mouth once a week 8/20/20 8/20/21  Historical Provider, MD   diphenhydrAMINE-APAP, sleep, (TYLENOL PM EXTRA STRENGTH)  MG tablet Take 1 tablet by mouth nightly as needed for Sleep    Historical Provider, MD   gabapentin (NEURONTIN) 100 MG capsule Take 2 capsules by mouth 3 times daily. 3/5/21   Historical Provider, MD   sodium chloride () 5 % ophthalmic solution Apply 1 drop to eye 3 times daily    Historical Provider, MD   acetaminophen (TYLENOL 8 HOUR ARTHRITIS PAIN) 650 MG extended release tablet Take 650 mg by mouth every 8 hours as needed for Pain    Historical Provider, MD   citalopram (CELEXA) 20 MG tablet Take 20 mg by mouth daily. Historical Provider, MD   b complex vitamins capsule Take 1 capsule by mouth daily. Historical Provider, MD   Pyridoxine HCl (VITAMIN B-6) 50 MG tablet Take 50 mg by mouth daily.     Historical Provider, MD   vitamin D3 (CHOLECALCIFEROL) 1000 UNITS TABS tablet Take 1,000 Units by mouth 2 times daily. Historical Provider, MD   Alum Hydroxide-Mag Trisilicate (GAVISCON) 84-43.3 MG CHEW Take by mouth as needed     Historical Provider, MD       ALLERGIES:   []  None    []   Information not available due to exam limitations documented above   Asa [aspirin], Codeine, and Pcn [penicillins]    PAST MEDICAL HISTORY: []  None   []   Information not available due to exam limitations documented above    has a past medical history of Chronic back pain, Neuropathy, and Stroke ([facility] [address] has a past surgical history that includes Hysterectomy; Appendectomy; Abdomen surgery (1995); Fixation Kyphoplasty (7/3/14); and Spine surgery (N/A, 5/10/2021). FAMILY HISTORY   []   Information not available due to exam limitations documented above  Does not recall pertinent family history    SOCIAL HISTORY  []   Information not available due to exam limitations documented above     reports that she has never smoked. She has never used smokeless tobacco.   reports no history of alcohol use. reports no history of drug use.     PERTINENT SYSTEMIC REVIEW:  []   Information not available due to exam limitations documented above  Constitutional: Denies fevers, chills, fatigue  Eyes: denies vision chnages, eye pain  Respiratory: denies cough, wheezing, sob  Cardiovascular: denies chest pain, palpitations, does endorse hx of MI many years ago  Gastrointestinal: denies nausea, vomiting, constipation  Genitourinary:denies dysuria, hematuria  Hematologic/lymphatic: denies easy bleeding, easy bruising, denies hx of blood clots, doesendorse hx of strokes  Musculoskeletal:does endorse left hip pain, denies spine tenderness, joint pain  Neurological: denies numbness, tingling, weakness  Allergic/Immunologic: endorses allergy to penicilli, makes her dizzy      PHYSICAL EXAMINATION:   [address] TO ARRIVAL     [x]No        []Yes      Variable  Score   Variable  Score  Eye opening [x]Spontaneous 4 Verbal  [x]Oriented  5     []To voice  3   []Confused  4    []To pain  2   []Inapp words  3    []None  1   []Incomp words 2       []None  1   Motor   [x]Obeys  6    []Localizes pain 5    []Withdraws(pain) 4    []Flexion(pain) 3  []Extension(pain) 2    []None  1     GCS Total = 15    PHYSICAL EXAMINATION  VITAL SIGNS:   Vitals:    11/04/21 1941   BP: (!) 148/58   Pulse:    Resp:    Temp:    SpO2:        General Appearance: alert and oriented to person, place and time, well-developed and well-nourished, in no acute distress  Skin: warm and dry, no rash or erythema and ecchymoses noted on right hip and left elbow and abrasion to left posterior hip  Head: normocephalic and atraumatic  Eyes: pupils equal, round, and reactive to light, extraocular eye movements intact, conjunctivae normal  Neck: neck supple and non tender without mass, no thyromegaly or thyroid nodules, no cervical lymphadenopathy   Pulmonary/Chest: no chest wall tenderness and normal work of breathing, symmetric chest rise and fall  Cardiovascular: normal rate, regular rhythm, intact distal pulses, no JVD and not currently in a fib  Abdomen: soft, non-tender, non-distended, normal bowel sounds, no masses or organomegaly and scars consistent with surgical history  Extremities: no cyanosis, clubbing or edema and DP/PT pulses palpable  Musculoskeletal: no swollen joints, no joint deformity and tenderness of left hip  Neurologic: no cranial nerve deficit and speech normal    FOCUSED ABDOMINAL SONOGRAM FOR TRAUMA (FAST): A good  quality examination was performed by Dr. Darrion Son and representative images were obtained. [] No free fluid in the abdomen or _______________________       RADIOLOGY  CT HEAD WO CONTRAST    Result Date: 11/4/2021  CT head: No acute intracranial abnormality. Senescent changes including significant chronic microvascular change. Atherosclerotic disease. CT C-spine: No acute fracture or traumatic malalignment. Patient has significant degenerative and degenerative disc disease more significant in the lower cervical spine with canal stenosis C6-C7 and severe neural foraminal narrowing as above. CT CERVICAL SPINE WO CONTRAST    Result Date: 11/4/2021  CT head: No acute intracranial abnormality. Senescent changes including significant chronic microvascular change. Atherosclerotic disease. CT C-spine: No acute fracture or traumatic malalignment. Patient has significant degenerative and degenerative disc disease more significant in the lower cervical spine with canal stenosis C6-C7 and severe neural foraminal narrowing as above. XR CHEST PORTABLE    Result Date: 11/4/2021  Mild cardiomegaly without pneumonia, interstitial edema or pleural effusions. No significant change from 07/30/2021. XR HIP 2-3 VW W PELVIS LEFT    Result Date: 11/4/2021  Intertrochanteric fracture left femoral neck             Mame Palacios DO  11/4/21, 8:06 PM         Attending Note    Patient seen in ED 25 for left intertrochanteric femur fracture sustained in a fall. Ortho consulted. Patient with history of strokes a decade ago and is on coumadin for a fib. Will ask cardiology to provide risk stratification for anticipated ORIF of hip  I have reviewed the above TECSS note(s) and I either performed the key elements of the medical history and physical exam or was present with the resident when the key elements of the medical history and physical exam were performed. I have discussed the findings, established the care plan and recommendations with Resident Domo Garner and Latosha Peacock.     Jeffrey Mckeon MD  11/4/2021  9:20 PM

## 2021-11-05 NOTE — BRIEF OP NOTE
Brief Postoperative Note      Patient: Debbie Son  YOB: 1940  MRN: [id number]   CSN: [id number]     Date of Procedure: 11/5/2021    Pre-Op Diagnosis: LEFT intertrochanteric femur fx    Post-Op Diagnosis: Same       Procedure(s):  CMN fixation of L IT fx; CPT 54405    Surgeon(s):  Carly Salgado DO    Assistant:  Resident: Yudith Pillai DO; Iveth Denise DO    Anesthesia: General    Estimated Blood Loss (mL): 200 cc    IVF: 1000 cc crystalloid    Complications: None    Specimens:   * No specimens in log *    Implants:  S&N 10 mm x 38 cm intertan with 80/75 mm lag/comp screws  Implant Name Type Inv.  Item Serial No.  Lot No. LRB No. Used Action   NAIL IM 10S L38CM [id number] 125DEG LT ORTHOAPEDIC RECON  NAIL IM 10S L38CM [id number] 125DEG LT [facility]THOPAED[facility]- [id number] Left 1 Implanted   KIT SCR LEG L80MM [id number] COMPR L75MM DIA7MM INTEGR INTLOK  KIT SCR LEG L80MM [id number] COMPR L75MM DIA7MM INTEGR INTLOK  [employer]- [id number] Left 1 Implanted   SCREW BNE L42.5MM DIA5MM CARLYN TIB G TI ST SELF DRL  SCREW BNE L42.5MM DIA5MM CARLYN TIB G TI ST SELF DRL  [employer] NEPHE[employer]ever[employer] [id number] Left 1 Implanted         Drains:   Urethral Catheter Double-lumen (Active)   Catheter Indications Perioperative use for selected surgical procedures 11/05/21 0742   Site Assessment Pale 11/04/21 1510   Urine Color Yellow 11/05/21 0742   Urine Appearance Clear 11/05/21 0742   Output (mL) 500 mL 11/05/21 1197       Findings: L IT fx    Electronically signed by Carly Salgado DO on 11/5/2021 at 2:03 PM

## 2021-11-05 NOTE — CARE COORDINATION
Met with pt and her son to complete an SBIRT. Pt is alert and oriented. She denies drinking/drug use. Pt also denies any depression symptoms and SBIRT is negative. Alcohol Screening and Brief Intervention        No results for input(s): ALC in the last 72 hours. Alcohol Pre-screening     (WOMEN ONLY) How many times in the past year have you had 4 or more drinks in a day?: None    Alcohol Screening Audit       Drug Pre-Screening   How many times in the past year have you used a recreational drug or used a prescription medication for nonmedical reasons?: None    Drug Screening DAST       Mood Pre-Screening (PHQ-2)  During the past two weeks, have you been bothered by little interest or pleasure in doing things?: No  During the past two weeks, have you been bothered by feeling down, depressed, or hopeless?: No    Mood Pre-Screening (PHQ-9)         I have interviewed Adrián June, [id number] regarding  Her alcohol consumption/drug use and risk for excessive use. Screenings were negative. Patient  N/A intervention at this time.    Deferred []    Completed on: 11/5/2021   ASHLEIGH Trevino

## 2021-11-05 NOTE — CONSULTS
[facility]    Consult / H&P               Today's Date: 11/5/2021  Patient Name: Jaquan Villanueva  Date of admission: 11/4/2021  7:37 PM  Patient's age: 78 y. o., 1940  Admission Dx: Left hip pain [M25.552]  Fall, initial encounter [W19. XXXA]  Closed left hip fracture, [facility] encounter ([facility] Ronny Ennis  Fall as cause of accidental injury in home as place of occurrence, initial encounter [W19. Annelise Santiago, C27.125]    Reason for Consult: preop eval    Requesting Physician: Alhaji Fall MD    CHIEF COMPLAINT:  Fracture of femur    History Obtained From:  patient, electronic medical record    HISTORY OF PRESENT ILLNESS:      The patient is a 78 y.o. female who is admitted to the hospital for left  intratrochanteric femur fracture after a fall. She came in as a transfer from [facility]. According to the patient patient did not feel lightheaded or dizzy prior to the fall she just went down, patient was on the ground for 30 minutes. Patient felt like her normal self after the fall but noticed left hip pain. Patient uses a cane to walk, she did not hit her head. Patient has prior history is a fall 2. Patient has history of A. fib and stroke in 2009 and is 2010, no lasting neurologic deficits. Past surgical history includes of appendectomy and hysterectomy. Troponin yesterday was 15, proBNP on 7/31 was 1845, 1495. PMH:  -A. fib diagnosed 6 to 7 months ago-on Coumadin and amiodarone  -Nonrheumatic MV regurg  -Hypertension  -Hyperlipidemia  -History of CVA in 2010    Social:  -Negative for smoking, alcohol, drugs.     EKG  -Done yesterday showed NSR    Echo  -DARRIN done on 7/31/2021 showed an ejection fraction of 65 to 70%, severe LVH with minimal outflow obstruction, Mitral Valve: Structurally normal. 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 Moderate to severe regurgitation, Aortic Valve: Increased velocity of LVOT and AV flow due to hyperdynamic state of the left Ventricle. Past Medical History:   has a past medical history of Chronic back pain, Neuropathy, and Stroke ([facility] [address] Past Surgical History:   has a past surgical history that includes Hysterectomy; Appendectomy; Abdomen surgery (1995); Fixation Kyphoplasty (7/3/14); and Spine surgery (N/A, 5/10/2021). Home Medications:    Prior to Admission medications    Medication Sig Start Date End Date Taking? Authorizing Provider   vitamin D (ERGOCALCIFEROL) 1.25 MG (66305 UT) CAPS capsule Take 1 capsule by mouth once a week for 8 doses 11/5/21 12/25/21 Yes Mike Waldron, DO   furosemide (LASIX) 20 MG tablet Take 1 tablet by mouth daily as needed (swelling, weight gain, sob) 8/16/21   Chance Andrade DO   amiodarone (CORDARONE) 200 MG tablet Take 1 tablet by mouth daily 8/3/21   Karla Dominguez MD   metoprolol tartrate (LOPRESSOR) 25 MG tablet Take 0.5 tablets by mouth 2 times daily 8/3/21   Karla Dominguez MD   warfarin (COUMADIN) 2 MG tablet Take 1 tablet by mouth daily Follow up with INR in 3 days 8/3/21   Karla Dominguez MD   alendronate (FOSAMAX) 70 MG tablet Take 70 mg by mouth once a week 8/20/20 8/20/21  Historical Provider, MD   diphenhydrAMINE-APAP, sleep, (TYLENOL PM EXTRA STRENGTH)  MG tablet Take 1 tablet by mouth nightly as needed for Sleep    Historical Provider, MD   gabapentin (NEURONTIN) 100 MG capsule Take 2 capsules by mouth 3 times daily. 3/5/21   Historical Provider, MD   sodium chloride () 5 % ophthalmic solution Apply 1 drop to eye 3 times daily    Historical Provider, MD   acetaminophen (TYLENOL 8 HOUR ARTHRITIS PAIN) 650 MG extended release tablet Take 650 mg by mouth every 8 hours as needed for Pain    Historical Provider, MD   citalopram (CELEXA) 20 MG tablet Take 20 mg by mouth daily. Historical Provider, MD   b complex vitamins capsule Take 1 capsule by mouth daily. Historical Provider, MD   Pyridoxine HCl (VITAMIN B-6) 50 MG tablet Take 50 mg by mouth daily.     Historical Provider, MD   vitamin D3 (CHOLECALCIFEROL) 1000 UNITS TABS tablet Take 1,000 Units by mouth 2 times daily. Historical Provider, MD   Alum Hydroxide-Mag Trisilicate (GAVISCON) 92-24.0 MG CHEW Take by mouth as needed     Historical Provider, MD       Allergies:  Asa [aspirin], Codeine, and Pcn [penicillins]    Social History:   reports that she has never smoked. She has never used smokeless tobacco. She reports that she does not drink alcohol and does not use drugs. Family History: family history is not on file. No h/o sudden cardiac death. REVIEW OF SYSTEMS:    · Constitutional: there has been no unanticipated weight loss. There's been No change in energy level, No change in activity level. · Eyes: No visual changes or diplopia. No scleral icterus. · ENT: No Headaches  · Cardiovascular: As above. · Respiratory: No previous pulmonary problems, No cough  · Gastrointestinal: No abdominal pain. No change in bowel or bladder habits. · Genitourinary: No dysuria, trouble voiding, or hematuria. · Musculoskeletal:  As above. · Integumentary: No rash or pruritis. · Neurological: No headache, diplopia, change in muscle strength, numbness or tingling. No change in gait, balance, coordination, mood, affect, memory, mentation, behavior. · Psychiatric: No anxiety, or depression. · Endocrine: No temperature intolerance. No excessive thirst, fluid intake, or urination. No tremor. · Hematologic/Lymphatic: No abnormal bruising or bleeding, blood clots or swollen lymph nodes. · Allergic/Immunologic: No nasal congestion or hives. PHYSICAL EXAM:      /76   Pulse 71   Temp 99.6 °F (37.6 °C) (Oral)   Resp 18   Ht 4' 11\" (1.499 m)   Wt 125 lb (56.7 kg)   SpO2 97%   BMI 25.25 kg/m²    Constitutional and General Appearance: alert, cooperative, no distress and appears stated age  HEENT: PERRL, no cervical lymphadenopathy. No masses palpable.  Normal oral mucosa  Respiratory:  · Normal excursion and

## 2021-11-05 NOTE — ANESTHESIA PROCEDURE NOTES
Peripheral Block    Patient location during procedure: pre-op  Start time: 11/5/2021 11:43 AM  End time: 11/5/2021 11:46 AM  Staffing  Performed: anesthesiologist   Anesthesiologist: Beny Ramirez MD  Preanesthetic Checklist  Completed: patient identified, IV checked, site marked, risks and benefits discussed, surgical consent, monitors and equipment checked, pre-op evaluation, timeout performed, anesthesia consent given, oxygen available and patient being monitored  Peripheral Block  Patient position: supine  Prep: ChloraPrep  Patient monitoring: cardiac monitor, continuous pulse ox, frequent blood pressure checks and IV access  Block type: Fascia iliaca  Laterality: left  Injection technique: single-shot  Guidance: ultrasound guided  Local infiltration: lidocaine  Infiltration strength: 1 %  Dose: 3 mL  Provider prep: mask and sterile gloves  Local infiltration: lidocaine  Needle  Needle type: short-bevel   Needle gauge: 21 G  Needle length: 10 cm  Needle localization: ultrasound guidance  Needle insertion depth: 2.5 cm  Catheter type: open end  Test dose: negative  Assessment  Injection assessment: negative aspiration for heme, no paresthesia on injection and local visualized surrounding nerve on ultrasound  Paresthesia pain: none  Slow fractionated injection: yes  Hemodynamics: stable  Additional Notes  U/S [id number].  (1) Under ultrasound guidance, a 21 gauge needle was inserted and placed in close proximity to the  nerve.  (2) Ultrasound was also used to visualize the spread of the anesthetic in close proximity to the nerve being blocked. (3) The nerve appeared anatomically normal, and (4 there were no apparent abnormal pathological findings on the image that were readily visible and related to the nerve being blocked. (5) A permanent ultrasound image was saved in the patient's record. 40 ml of .125% marcaine.             Reason for block: post-op pain management and at surgeon's request

## 2021-11-05 NOTE — PROGRESS NOTES
Orthopedic Progress Note    Patient:  Ellis Francisco  YOB: 1940     78 y.o. female    Subjective:  Patient seen and examined at bedside this morning  Complaints of pain about the hip, but not in apparent distress. No issue overnight  Denies fever, HA, CP, SOB, N/V, dysuria  No formal PT assessment yesterday     Vitals reviewed, afebrile    Objective:   Vitals:    11/05/21 0505   BP: (!) 133/59   Pulse: 67   Resp: 20   Temp: 100 °F (37.8 °C)   SpO2:      Gen: NAD, cooperative     Cardiovascular: Regular rate    Respiratory: Chest symmetric, no accessory muscle use    LLE: Skin intact with no ecchymosis or abrasions. Leg shortened and external rotation. Tenderness to palpation about the hip joint. Compartments soft and easily compressible. EHL/FHL/TA/GS complex motor intact. Sural/saphenous/SPN/DPN/plantar nerve distribution SILT. DP/PT pulse 1+ with BCR. Recent Labs     11/04/21  1301 11/04/21  1301 11/05/21  0231   WBC 9.5  --   --    HGB 8.9*  --   --    HCT 29.2*  --   --      --   --    INR 2.5   < > 1.6     --   --    K 3.8  --   --    BUN 12  --   --    CREATININE 0.54  --   --    GLUCOSE 106*  --   --     < > = values in this interval not displayed. Meds: EPC  See rec for complete list    Impression/plan: 78 y.o. female who [address], being seen for the following orthopaedic injuries:      -Left intertrochanteric femur fracture     -To OR today (11/5) for L femur IMN  -WB status: NWB LLE  -NPO  -Ancef OCTOR  -Type and crossed for 2 units  -Would appreciate cardiac clearance in anticipation for surgery  -Please hold chemical AC for surgery today. OK to continue POD1  -Ice (20 min, 1 hour off) for edema/pain control  -PT/OT assessment and treatment after surgery  -Please page ortho with any questions    Papo Fermin, DO  Orthopedic Surgery Resident, PGY-1  [place] [id number], [place]    PGY-2 Addendum    Patient seen and examined.  Agree with subjective and objective portions from Dr. Latonya Templeton. The patient is a 78 y.o. female with a left IT femur fx. Plan for OR this morning; require cardiac stratification prior to OR. Consented; marked; maintain NPO. T&C'd for OR. Hold chemical AC in anticipation of surgery. Ice for pain and swelling.      Electronically signed by Yudith Pillai DO 6:13 AM 11/5/2021

## 2021-11-05 NOTE — PROGRESS NOTES
POST OP NOTE    SUBJECTIVE  Pt s/p lt femur IMN     OBJECTIVE  VITALS:  BP (!) 118/57   Pulse 85   Temp 97.9 °F (36.6 °C) (Oral)   Resp 16   Ht 4' 11\" (1.499 m)   Wt 125 lb (56.7 kg)   SpO2 99%   BMI 25.25 kg/m²         GENERAL:  awake and alert. No acute distress  CARDIOVASCULAR:  regular rate and rhythm   LUNGS:  CTA Bilaterally  ABDOMEN:   Abdomen soft, non-tender, non-distended  INCISION: Incision clean/dry/intact    ASSESSMENT  1. POD# 0 s/p left IT femur fracture  2.  Will obtain post op H/H        Darío Medico, DO  Trauma/Surgery Service  11/5/2021 at 5:30 PM

## 2021-11-05 NOTE — PLAN OF CARE
Problem: Falls - Risk of:  Goal: Will remain free from falls  Description: Will remain free from falls  11/5/2021 1032 by Emigdio Florez RN  Outcome: Ongoing  11/5/2021 0332 by Kenneth Saunders RN  Outcome: Ongoing  Goal: Absence of physical injury  Description: Absence of physical injury  11/5/2021 1032 by Emigdio Florez RN  Outcome: Ongoing  11/5/2021 0332 by Kenneth Saunders RN  Outcome: Ongoing     Problem: Skin Integrity:  Goal: Will show no infection signs and symptoms  Description: Will show no infection signs and symptoms  11/5/2021 1032 by Emigdio Florez RN  Outcome: Ongoing  11/5/2021 0332 by Kenneth Saunders RN  Outcome: Ongoing  Goal: Absence of new skin breakdown  Description: Absence of new skin breakdown  11/5/2021 1032 by Emigdio Florez RN  Outcome: Ongoing  11/5/2021 0332 by Kenneth Saunders RN  Outcome: Ongoing     Problem: Pain:  Goal: Pain level will decrease  Description: Pain level will decrease  Outcome: Ongoing  Goal: Control of acute pain  Description: Control of acute pain  Outcome: Ongoing  Goal: Control of chronic pain  Description: Control of chronic pain  Outcome: Ongoing

## 2021-11-05 NOTE — FLOWSHEET NOTE
Assessment:  Patient is a 78year old female in room 12. Patient was awake and welcomed . Intervention:   provided active compassionate listening. Patient open to prayer. Outcome:  Patient expressed gratitude for visit. Plan:  Chaplains may be paged 24/7 via [address] [place] [address].      11/05/21 0608   Encounter Summary   Services provided to: Patient   Referral/Consult From: [address] [place] [address] Visiting   (11/5/2021)   Complexity of Encounter Moderate   Length of Encounter 15 minutes   Spiritual Assessment Completed Yes   Routine   Type Initial   Assessment Approachable   Intervention Prayer   Outcome Expressed gratitude

## 2021-11-05 NOTE — PROGRESS NOTES
Speech Language Pathology  [facility]  Speech Language Pathology    SPEECH/COGNITIVE ASSESSMENT    NO LOC,CHI OR CVA/TIA - ST TO DEFER AT THIS TIME      Date: 11/5/2021  Patient Name: Peggy Cruz  YOB: 1940   AGE: 78 y.o. MRN: [id number]        PT NOT SEEN FOR SPEECH OR COGNITIVE ASSESSMENT AT THIS TIME AS NO LOC, CHI OR CVA/TIA IS DOCUMENTED. ST TO DEFER AT THIS TIME. PLEASE RE-COSULT AS NEEDED.       Saroj Coulter, SLP  11/5/2021  6:57 AM

## 2021-11-05 NOTE — PROGRESS NOTES
Patient alert oriented x 2-3, SR on the monitor, O2 2L/NC lungs clear, abd soft +bsx4, cedillo yellow, skin warm dry intact, pos pulses, no edema, c/o pain left hip when touched. IVF infusing will continue to monitor.

## 2021-11-06 LAB
ABSOLUTE EOS #: 0 K/UL (ref 0–0.4)
ABSOLUTE IMMATURE GRANULOCYTE: 0.06 K/UL (ref 0–0.3)
ABSOLUTE LYMPH #: 0.63 K/UL (ref 1–4.8)
ABSOLUTE MONO #: 0.46 K/UL (ref 0.1–0.8)
ANION GAP SERPL CALCULATED.3IONS-SCNC: 9 MMOL/L (ref 9–17)
BASOPHILS # BLD: 0 % (ref 0–2)
BASOPHILS ABSOLUTE: 0 K/UL (ref 0–0.2)
BUN BLDV-MCNC: 13 MG/DL (ref 8–23)
BUN/CREAT BLD: ABNORMAL (ref 9–20)
CALCIUM SERPL-MCNC: 7.6 MG/DL (ref 8.6–10.4)
CHLORIDE BLD-SCNC: 103 MMOL/L (ref 98–107)
CO2: 25 MMOL/L (ref 20–31)
CREAT SERPL-MCNC: 0.59 MG/DL (ref 0.5–0.9)
DIFFERENTIAL TYPE: ABNORMAL
EOSINOPHILS RELATIVE PERCENT: 0 % (ref 1–4)
GFR AFRICAN AMERICAN: >60 ML/MIN
GFR NON-AFRICAN AMERICAN: >60 ML/MIN
GFR SERPL CREATININE-BSD FRML MDRD: ABNORMAL ML/MIN/{1.73_M2}
GFR SERPL CREATININE-BSD FRML MDRD: ABNORMAL ML/MIN/{1.73_M2}
GLUCOSE BLD-MCNC: 143 MG/DL (ref 70–99)
HCT VFR BLD CALC: 21.8 % (ref 36.3–47.1)
HCT VFR BLD CALC: 26.6 % (ref 36.3–47.1)
HEMOGLOBIN: 6.4 G/DL (ref 11.9–15.1)
HEMOGLOBIN: 8.1 G/DL (ref 11.9–15.1)
IMMATURE GRANULOCYTES: 1 %
LYMPHOCYTES # BLD: 11 % (ref 24–44)
MAGNESIUM: 2.1 MG/DL (ref 1.6–2.6)
MCH RBC QN AUTO: 29 PG (ref 25.2–33.5)
MCHC RBC AUTO-ENTMCNC: 29.4 G/DL (ref 28.4–34.8)
MCV RBC AUTO: 98.6 FL (ref 82.6–102.9)
MONOCYTES # BLD: 8 % (ref 1–7)
MORPHOLOGY: ABNORMAL
NRBC AUTOMATED: 0.4 PER 100 WBC
PDW BLD-RTO: 19 % (ref 11.8–14.4)
PLATELET # BLD: 154 K/UL (ref 138–453)
PLATELET ESTIMATE: ABNORMAL
PMV BLD AUTO: 12.1 FL (ref 8.1–13.5)
POTASSIUM SERPL-SCNC: 4.4 MMOL/L (ref 3.7–5.3)
RBC # BLD: 2.21 M/UL (ref 3.95–5.11)
RBC # BLD: ABNORMAL 10*6/UL
SEG NEUTROPHILS: 80 % (ref 36–66)
SEGMENTED NEUTROPHILS ABSOLUTE COUNT: 4.55 K/UL (ref 1.8–7.7)
SODIUM BLD-SCNC: 137 MMOL/L (ref 135–144)
WBC # BLD: 5.7 K/UL (ref 3.5–11.3)
WBC # BLD: ABNORMAL 10*3/UL

## 2021-11-06 PROCEDURE — P9040 RBC LEUKOREDUCED IRRADIATED: HCPCS

## 2021-11-06 PROCEDURE — 86900 BLOOD TYPING SEROLOGIC ABO: CPT

## 2021-11-06 PROCEDURE — 2700000000 HC OXYGEN THERAPY PER DAY

## 2021-11-06 PROCEDURE — 6370000000 HC RX 637 (ALT 250 FOR IP): Performed by: STUDENT IN AN ORGANIZED HEALTH CARE EDUCATION/TRAINING PROGRAM

## 2021-11-06 PROCEDURE — 2580000003 HC RX 258: Performed by: STUDENT IN AN ORGANIZED HEALTH CARE EDUCATION/TRAINING PROGRAM

## 2021-11-06 PROCEDURE — 85014 HEMATOCRIT: CPT

## 2021-11-06 PROCEDURE — 83735 ASSAY OF MAGNESIUM: CPT

## 2021-11-06 PROCEDURE — 85025 COMPLETE CBC W/AUTO DIFF WBC: CPT

## 2021-11-06 PROCEDURE — 80048 BASIC METABOLIC PNL TOTAL CA: CPT

## 2021-11-06 PROCEDURE — 36430 TRANSFUSION BLD/BLD COMPNT: CPT

## 2021-11-06 PROCEDURE — 1200000000 HC SEMI PRIVATE

## 2021-11-06 PROCEDURE — 85018 HEMOGLOBIN: CPT

## 2021-11-06 PROCEDURE — 94761 N-INVAS EAR/PLS OXIMETRY MLT: CPT

## 2021-11-06 PROCEDURE — 36415 COLL VENOUS BLD VENIPUNCTURE: CPT

## 2021-11-06 PROCEDURE — 6360000002 HC RX W HCPCS: Performed by: STUDENT IN AN ORGANIZED HEALTH CARE EDUCATION/TRAINING PROGRAM

## 2021-11-06 RX ORDER — WARFARIN SODIUM 2 MG/1
2 TABLET ORAL
Status: DISCONTINUED | OUTPATIENT
Start: 2021-11-08 | End: 2021-11-07 | Stop reason: HOSPADM

## 2021-11-06 RX ORDER — SODIUM CHLORIDE 9 MG/ML
INJECTION, SOLUTION INTRAVENOUS PRN
Status: DISCONTINUED | OUTPATIENT
Start: 2021-11-06 | End: 2021-11-07 | Stop reason: HOSPADM

## 2021-11-06 RX ORDER — WARFARIN SODIUM 2 MG/1
2 TABLET ORAL DAILY
Status: DISCONTINUED | OUTPATIENT
Start: 2021-11-07 | End: 2021-11-06

## 2021-11-06 RX ORDER — WARFARIN SODIUM 2 MG/1
4 TABLET ORAL
Status: DISCONTINUED | OUTPATIENT
Start: 2021-11-07 | End: 2021-11-07 | Stop reason: HOSPADM

## 2021-11-06 RX ADMIN — OXYCODONE HYDROCHLORIDE 2.5 MG: 5 TABLET ORAL at 17:38

## 2021-11-06 RX ADMIN — ACETAMINOPHEN 1000 MG: 500 TABLET ORAL at 05:52

## 2021-11-06 RX ADMIN — ACETAMINOPHEN 1000 MG: 500 TABLET ORAL at 13:47

## 2021-11-06 RX ADMIN — CITALOPRAM 20 MG: 20 TABLET, FILM COATED ORAL at 08:37

## 2021-11-06 RX ADMIN — Medication 50 MG: at 08:37

## 2021-11-06 RX ADMIN — ACETAMINOPHEN 1000 MG: 500 TABLET ORAL at 21:32

## 2021-11-06 RX ADMIN — OXYCODONE HYDROCHLORIDE 2.5 MG: 5 TABLET ORAL at 05:52

## 2021-11-06 RX ADMIN — ENOXAPARIN SODIUM 30 MG: 30 INJECTION SUBCUTANEOUS at 11:57

## 2021-11-06 RX ADMIN — OXYCODONE HYDROCHLORIDE 2.5 MG: 5 TABLET ORAL at 11:56

## 2021-11-06 RX ADMIN — Medication 1000 UNITS: at 08:37

## 2021-11-06 RX ADMIN — GABAPENTIN 200 MG: 100 CAPSULE ORAL at 21:32

## 2021-11-06 RX ADMIN — SODIUM CHLORIDE, PRESERVATIVE FREE 10 ML: 5 INJECTION INTRAVENOUS at 21:33

## 2021-11-06 RX ADMIN — Medication 1000 UNITS: at 21:32

## 2021-11-06 RX ADMIN — DEXTROSE MONOHYDRATE 2000 MG: 50 INJECTION, SOLUTION INTRAVENOUS at 04:26

## 2021-11-06 RX ADMIN — POLYETHYLENE GLYCOL 3350 17 G: 17 POWDER, FOR SOLUTION ORAL at 08:37

## 2021-11-06 RX ADMIN — GABAPENTIN 200 MG: 100 CAPSULE ORAL at 08:37

## 2021-11-06 RX ADMIN — ENOXAPARIN SODIUM 30 MG: 30 INJECTION SUBCUTANEOUS at 21:32

## 2021-11-06 RX ADMIN — METOPROLOL TARTRATE 12.5 MG: 25 TABLET ORAL at 21:32

## 2021-11-06 RX ADMIN — GABAPENTIN 200 MG: 100 CAPSULE ORAL at 13:47

## 2021-11-06 ASSESSMENT — PAIN SCALES - GENERAL
PAINLEVEL_OUTOF10: 5
PAINLEVEL_OUTOF10: 5
PAINLEVEL_OUTOF10: 4
PAINLEVEL_OUTOF10: 4
PAINLEVEL_OUTOF10: 3
PAINLEVEL_OUTOF10: 4
PAINLEVEL_OUTOF10: 6
PAINLEVEL_OUTOF10: 5

## 2021-11-06 ASSESSMENT — PAIN DESCRIPTION - PAIN TYPE: TYPE: SURGICAL PAIN

## 2021-11-06 ASSESSMENT — PAIN DESCRIPTION - DESCRIPTORS: DESCRIPTORS: ACHING;CONSTANT

## 2021-11-06 ASSESSMENT — PAIN DESCRIPTION - LOCATION: LOCATION: HIP

## 2021-11-06 ASSESSMENT — PAIN DESCRIPTION - ORIENTATION: ORIENTATION: LEFT

## 2021-11-06 NOTE — PROGRESS NOTES
Orthopedic Progress Note    Patient:  Nini Sierra, 78 y.o. female  YOB: 1940       Subjective:  Patient seen and examined. POD1 from left femur CMN. No complaints or concerns. Pain controlled on current regimen. No acute issues overnight. Denies fever, HA, CP, SOB, N/V. Tolerating PO intake. Objective:   Vitals:    11/06/21 0424   BP: (!) 105/50   Pulse: 64   Resp: 16   Temp: 99.1 °F (37.3 °C)   SpO2: 99%     Gen: NAD, cooperative    Cardiovascular: Regular rate    Respiratory: Symmetric chest rise. No accessory muscle use    LLE: Dressings c/d/i. Mild TTP about the hip/proximal thigh. Compartments soft. EHL/FHL/TA/GSC motor intact. Sensation intact to sural/saph/SPN/DPN distribution. DP/PT pulses 2+. Recent Labs     11/05/21  0700 11/05/21  1338 11/06/21  0545   WBC 6.8  --  5.7   HGB 7.8*   < > 6.4*   HCT 27.0*   < > 21.8*     --  154   INR 1.2  --   --      --   --    K 4.0  --   --    BUN 10  --   --    CREATININE 0.61  --   --    GLUCOSE 91  --   --     < > = values in this interval not displayed. Anticoag: None  Abx: Completed    Impression/Plan: 78 y.o. female who [address], with:    1. Left intertrochanteric femur fracture, POD1 from CMN insertion      - Hgb 6.4 post op. Decision to transfuse per primary. Recommend keeping above 7.0  - WBAT to LLE  - Maintain dressings. Ok to reinforce.  Plan to change POD2  - Ok to eat from ortho standpoint  - Post op abx completed  - Ok to resume chemical AC today, POD1  - Medical management per primary  - PT/OT    Electronically signed by Yareli Conde DO, on 11/6/2021 at 6:23 AM.

## 2021-11-06 NOTE — PROGRESS NOTES
[facility]  Progress Note                   Date:   11/6/2021  Patient name: Marcos Maciel  Date of admission:  11/4/2021  7:37 PM  MRN:   [id number]  YOB: 1940  PCP: Hawa Taylor MD    Reason for Admission: Left hip pain [M25.552]  Fall, initial encounter [W19. XXXA]  Closed left [facility] fracture, [facility] Joshua Sober  Fall as cause of accidental injury in home as place of occurrence, initial encounter [W19. Chioma Cárdenas, Y92.009]    Subjective:       Clinical Changes /Abnormalities:Patient seen and examined in room with family at bedside. Denies chest pain and SOB. POD 1 Ortho surgery for left femur fracture. SR on tele      Review of Systems    Medications:   Scheduled Meds:   enoxaparin  30 mg SubCUTAneous BID    amiodarone  200 mg Oral Daily    citalopram  20 mg Oral Daily    gabapentin  200 mg Oral TID    metoprolol tartrate  12.5 mg Oral BID    vitamin B-6  50 mg Oral Daily    Vitamin D  1,000 Units Oral BID    sodium chloride flush  5-40 mL IntraVENous 2 times per day    polyethylene glycol  17 g Oral Daily    acetaminophen  1,000 mg Oral 3 times per day     Continuous Infusions:   sodium chloride       CBC:   Recent Labs     11/04/21  1301 11/04/21  1301 11/05/21  0700 11/05/21  0700 11/05/21  1338 11/05/21  1738 11/06/21  0545   WBC 9.5  --  6.8  --   --   --  5.7   HGB 8.9*   < > 7.8*   < > 8.5 7.2* 6.4*     --  168  --   --   --  154    < > = values in this interval not displayed. BMP:    Recent Labs     11/04/21  1301 11/05/21  0700 11/06/21  0545    138 137   K 3.8 4.0 4.4    104 103   CO2 27 26 25   BUN 12 10 13   CREATININE 0.54 0.61 0.59   GLUCOSE 106* 91 143*     Hepatic:No results for input(s): AST, ALT, ALB, BILITOT, ALKPHOS in the last 72 hours. Troponin:   Recent Labs     11/04/21  1301   TROPHS 15*     BNP: No results for input(s): BNP in the last 72 hours.   Lipids: No results for input(s): CHOL, HDL in the last 72 hours.    Invalid input(s): LDLCALCU  INR:   Recent Labs     11/04/21  1301 11/05/21  0231 11/05/21  0700   INR 2.5 1.6 1.2       Diagnostic data  Echocardiogram 11/05/2021:  Left ventricle is normal in size. Global left ventricular systolic function   is hyperdynamic. Calculated ejection [id number] % by Heart Model. Increased velocities of LVOT and AV flow due to hyperdynamic state of LV. Moderate concentric left ventricular hypertrophy. Left atrium is normal in size. Right atrium is normal in size. Normal right ventricular size and function. Aortic valve is sclerotic but opens well. No aortic insufficiency. Peak instantaneous gradient 40 mmHg and mean gradient 21 mmHg. No obvious valvular abnormality. Moderate tricuspid regurgitation. Moderate pulmonary hypertension. Estimated right ventricular systolic   pressure is 62LDLF. No significant pericardial effusion is seen. Echo  -DARRIN done on 7/31/2021 showed an ejection fraction of 65 to 70%, severe LVH with minimal outflow obstruction, Mitral Valve: Structurally normal. 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 Moderate to severe regurgitation, Aortic Valve: Increased velocity of LVOT and AV flow due to hyperdynamic state of the left Ventricle. Objective:   Vitals: BP (!) 108/53   Pulse 69   Temp 98.3 °F (36.8 °C) (Oral)   Resp 18   Ht 4' 11\" (1.499 m)   Wt 125 lb (56.7 kg)   SpO2 100%   BMI 25.25 kg/m²   General appearance: alert and cooperative with exam  HEENT: Head: Normocephalic, no lesions, without obvious abnormality. Neck:no JVD, trachea midline, no adenopathy  Lungs: Clear to auscultation  Heart: Regular rate and rhythm, s1/s2 auscultated, no murmurs  Abdomen: soft, non-tender, bowel sounds active  Extremities: no edema  Neurologic: not done        Assessment / Acute Cardiac Problems:   1. Paroxysmal atrial fibrillation diagnosed July 2021(on Coumadin, Lopressor, amiodarone) currently in NSR.   2. Moderate to severe MR on DARRIN, repeat echo showed only mild to moderate MR, currently stable with no signs of volume overload. 3. Hypertension  4. Hyperlipidemia  5. History of CVA      Patient Active Problem List:     Compression fracture of T12 vertebra ([address]     Osteoporosis with pathological fracture     New onset atrial fibrillation (HCC)     Nonrheumatic mitral valve regurgitation     Essential hypertension     Normocytic normochromic anemia     Depression     Fall as cause of accidental injury at home as place of occurrence     Closed displaced intertrochanteric fracture of left femur ([address]      Plan of Treatment:   1. Fall s/p left femur CMN POD 1 orders per Ortho surgery. 2. PAF  SR on tele HR 84 on tele. Continue PO amiodarone and BB. Resume AC coumadin when ok with Trauma and Ortho. Per Ortho note okay for [facility] today. 3. HTN controlled. Continue BB   4. Keep K > 4.0 and Mag > 2.0 K 4.4 today Will order Mag.   5. Rec Keep Hgb > 8.0  6.4 today  PRBC received today per primary. 6. Rest of care managed per Primary Team.   7. Follow up with Primary Cardiologist in [place] after discharge.      Electronically signed by GOYO Norman NP on 11/6/2021 at 12:18 PM  [id number] [address]  [phone]

## 2021-11-06 NOTE — DISCHARGE INSTR - COC
Continuity of Care Form    Patient Name: Nini Sierra   :  1940  MRN:  [id number]    Admit date:  2021  Discharge date:  2021    Code Status Order: Full Code   Advance Directives:   Advance Care Flowsheet Documentation       Date/Time Healthcare Directive Type of Healthcare Directive Copy in [address] Agent's Name Healthcare Agent's Phone Number    21 1128 No, patient does not have an advance directive for healthcare treatment                 Admitting Physician:  Ray Archibald MD  PCP: Carl Hilario MD    Discharging Nurse: [facility] RN   [address] Unit/Room#: 8509/2521-15  Discharging Unit Phone Number: [phone]    Emergency Contact:   Extended Emergency Contact Information  Primary Emergency Contact: Katharina Guzmán  Address: [address] [place] [address] [facility] Phone: [phone]  Mobile Phone: [phone]  Relation: Child    Past Surgical History:  Past Surgical History:   Procedure Laterality Date    ABDOMEN SURGERY      bowel resection for diverticulosis    APPENDECTOMY      FEMUR SURGERY Left 2021    nailing    FIXATION KYPHOPLASTY  7/3/14    T 12    HYSTERECTOMY      SPINE SURGERY N/A 5/10/2021    L2 ET L4 KYPHOPLASTY performed by Magui Hughes MD at [address] [place] [address]       Immunization History:   Immunization History   Administered Date(s) Administered    COVID-19, BRIAN Santana, 30mcg/0.3mL 2021, 2021       Active Problems:  Patient Active Problem List   Diagnosis Code    Compression fracture of T12 vertebra ([facility] S22.080A    Osteoporosis with pathological fracture M80.00XA    New onset atrial fibrillation (HCC) I48.91    Nonrheumatic mitral valve regurgitation I34.0    Essential hypertension I10    Normocytic normochromic anemia D64.9    Depression F32. A    Fall as cause of accidental injury at home as place of occurrence W19. XXXA, Y92.009    Closed displaced intertrochanteric fracture of left femur ([facility] Y27.284C       Isolation/Infection:   Isolation            No Isolation          Patient Infection Status       Infection Onset Added Last Indicated Last Indicated By Review Planned Expiration Resolved Resolved By    None active    Resolved    COVID-19 Rule Out 05/05/21 05/05/21 05/05/21 COVID-19 (Ordered)   05/06/21 Rule-Out Test Resulted            Nurse Assessment:  Last Vital Signs: BP (!) 108/53   Pulse 69   Temp 98.3 °F (36.8 °C) (Oral)   Resp 18   Ht 4' 11\" (1.499 m)   Wt 125 lb (56.7 kg)   SpO2 100%   BMI 25.25 kg/m²     Last documented pain score (0-10 scale): Pain Level: 4  Last Weight:   Wt Readings from Last 1 Encounters:   11/04/21 125 lb (56.7 kg)     Mental Status:  oriented    IV Access:  - None    Nursing Mobility/ADLs:  Walking   Dependent  Transfer  Dependent  Bathing  Dependent  Dressing  Dependent  Toileting  Dependent  Feeding  Assisted  Med Admin  Assisted  Med Delivery   whole    Wound Care Documentation and Therapy:        Elimination:  Continence: Bowel: Yes  Bladder: Yes  Urinary Catheter: cedillo discontinued at 10am   Colostomy/Ileostomy/Ileal Conduit: No       Date of Last BM: 11/7/2021    Intake/Output Summary (Last 24 hours) at 11/6/2021 1148  Last data filed at 11/6/2021 0615  Gross per 24 hour   Intake 2175 ml   Output 730 ml   Net 1445 ml     I/O last 3 completed shifts: In: 2175 [P.O.:500;  I.V.:1675]  Out: 730 [Urine:530; Blood:200]    Safety Concerns:     History of Falls (last 30 days)    Impairments/Disabilities:      Hearing    Nutrition Therapy:  Current Nutrition Therapy:   - Oral Diet:  General    Routes of Feeding: Oral  Liquids: No Restrictions  Daily Fluid Restriction: no  Last Modified Barium Swallow with Video (Video Swallowing Test): not done    Treatments at the Time of Hospital Discharge:   Respiratory Treatments:   Oxygen Therapy: Ventilator:    - No ventilator support    Rehab Therapies: Physical Therapy and Occupational Therapy  Weight Bearing Status/Restrictions: No weight bearing restirctions  Other Medical Equipment (for information only, NOT a DME order):  wheelchair and walker  Other Treatments:     Orthopedic Instructions:  Weight bearing status: Weight bearing as tolerated for the left leg  Keep dressing clean and dry. Starting 3 days after surgery, Ok for daily dressing changes until wound is dry. Then leave open to air. Starting 3 days after surgery, if wound is no longer leaking, Ok to shower but no soaks in a bath, hot tub, pool, etc.  Physical Therapy for strengthening, range of motion, and gait training. Ice (20 minutes on and off 1 hour) and elevate above the level of the heart to reduce swelling and throbbing pain. Drink plenty of fluids. Should urinate within 8 hours of surgery. Call the office or come to Emergency Room if signs of infection appear (hot, swollen, red, draining pus, fever)  Take medications as prescribed. Wean off narcotics (percocet/norco) as soon as possible. Do not take tylenol if still taking narcotics. Patient's personal belongings (please select all that are sent with patient):   All belongings sent with pt    RN SIGNATURE:  Electronically signed by Luke Burroughs RN on 11/6/21 at 5:42 PM EDT    CASE MANAGEMENT/SOCIAL WORK SECTION    Inpatient Status Date: ***    Readmission Risk Assessment Score:  Readmission Risk              Risk of Unplanned Readmission:  17           Discharging to Facility/ Agency   Name: [facility] Alvarez  [address] [place] [address],Third Floor, [address] [address]         Phone: [phone]       Fax: [phone]          Dialysis Facility (if applicable)   Name:  Address:  Dialysis Schedule:  Phone:  Fax:    / signature: Electronically signed by Ingris Damian RN on 11/7/21 at 10:35 AM EST    PHYSICIAN SECTION    Prognosis: Good    Condition at Discharge: Stable    Rehab Potential (if transferring to Rehab): Good    Recommended Labs or Other Treatments After Discharge: Needs INR rechecked. Physician Certification: I certify the above information and transfer of Cristobal Jacobo  is necessary for the continuing treatment of the diagnosis listed and that she requires [place] for less 30 days.      Update Admission H&P: No change in H&P    PHYSICIAN SIGNATURE:  Electronically signed by Oneida Norwood MD on 11/6/21 at 12:06 PM EDT

## 2021-11-06 NOTE — PLAN OF CARE
Problem: Falls - Risk of:  Goal: Will remain free from falls  Description: Will remain free from falls  11/6/2021 1603 by Lacey Mojica RN  Outcome: Ongoing  11/6/2021 0356 by Luther Núñez RN  Outcome: Ongoing  Goal: Absence of physical injury  Description: Absence of physical injury  11/6/2021 1603 by Lacey Mojica RN  Outcome: Ongoing  11/6/2021 0356 by Luther Núñez RN  Outcome: Ongoing     Problem: Skin Integrity:  Goal: Will show no infection signs and symptoms  Description: Will show no infection signs and symptoms  11/6/2021 1603 by Lacey Mojica RN  Outcome: Ongoing  11/6/2021 0356 by Luther Núñez RN  Outcome: Ongoing  Goal: Absence of new skin breakdown  Description: Absence of new skin breakdown  11/6/2021 1603 by Lacey Mojica RN  Outcome: Ongoing  11/6/2021 0356 by Luther Núñez RN  Outcome: Ongoing     Problem: Pain:  Goal: Pain level will decrease  Description: Pain level will decrease  11/6/2021 1603 by Lacey Mojica RN  Outcome: Ongoing  11/6/2021 0356 by Luther Núñez RN  Outcome: Ongoing  Goal: Control of acute pain  Description: Control of acute pain  11/6/2021 1603 by Lacey Mojica RN  Outcome: Ongoing  11/6/2021 0356 by Luther Núñez RN  Outcome: Ongoing  Goal: Control of chronic pain  Description: Control of chronic pain  11/6/2021 1603 by Lacey Mojica RN  Outcome: Ongoing  11/6/2021 0356 by Luther Núñez RN  Outcome: Ongoing

## 2021-11-06 NOTE — PROGRESS NOTES
PROGRESS NOTE      PATIENT NAME: Peggy Cruz  MEDICAL RECORD NO. [id number]  DATE: 2021  SURGEON: Dr Almanza Cough: Zenaida Bower MD    HD: # 2    ASSESSMENT    Patient Active Problem List   Diagnosis    Compression fracture of T12 vertebra ([address]    Osteoporosis with pathological fracture    New onset atrial fibrillation (HCC)    Nonrheumatic mitral valve regurgitation    Essential hypertension    Normocytic normochromic anemia    Depression    Fall as cause of accidental injury at home as place of occurrence    Closed displaced intertrochanteric fracture of left femur [facility])       MEDICAL DECISION MAKING AND PLAN    1. [address] [address] Kaiser for general diet  2. Will transfuse one unit prbc this AM  3. Follow up post transfusion H&H and clinical signs of anemia  4. Will discuss initiation of DVT ppx  5. Follow up Ortho recommendations - WBAT to LLE  6. OK for PT/OT today  7. Discharge planning       SUBJECTIVE    Peggy Cruz seen and examined at bedside. No acute events overnight. Patient reports she feels well overall. Pain is controlled. 0.4cc/kg/hr UOP last 24 hours.       OBJECTIVE  VITALS: Temp: Temp: 97.9 °F (36.6 °C)Temp  Av °F (37.8 °C)  Min: 97 °F (36.1 °C)  Max: 100.5 °F (44.1 °C) BP Systolic (86LJT), RWP:427 , Min:65 , NWQ:513   Diastolic (43AOW), HZQ:28, Min:35, Max:146   Pulse Pulse  Av.8  Min: 64  Max: 85 Resp Resp  Av.1  Min: 0  Max: 24 Pulse ox SpO2  Av.9 %  Min: 91 %  Max: 100 %    CONSTITUTIONAL: awake, alert, cooperative, no apparent distress  HEAD: atraumatic, normocephalic  EYES: sclera clear, pupils equal and reactive to light  ENT: ears are symmetric, nares patent   HEENT: moist mucous membranes  NECK: Supple, symmetrical, trachea midline  LUNGS: no respiratory distress, no audible wheezing  CARDIOVASCULAR: RRR  ABDOMEN: soft, nontender, nondistended  MUSCULOSKELETAL: limited range of motion of LLE 2/2 pain, neurovascularly intact, dressing c/d/i without strikethrough  NEUROLOGIC: Awake, alert, oriented to name, place and time  EXTREMITIES: peripheral pulses normal  SKIN: normal coloration and turgor    I/O last 3 completed shifts: In: 2175 [P.O.:500; I.V.:1675]  Out: 730 [Urine:530; Blood:200]    Drain/tube output: In: 9419 [P.O.:500; I.V.:675]  Out: 520 [Urine:520]    LAB:  CBC:   Recent Labs     11/04/21  1301 11/04/21  1301 11/05/21  0700 11/05/21  0700 11/05/21  1338 11/05/21  1738 11/06/21  0545   WBC 9.5  --  6.8  --   --   --  5.7   HGB 8.9*   < > 7.8*   < > 8.5 7.2* 6.4*   HCT 29.2*   < > 27.0*   < > 26.4 24.5* 21.8*   MCV 95.7  --  98.2  --   --   --  98.6     --  168  --   --   --  154    < > = values in this interval not displayed.      BMP:   Recent Labs     11/04/21  1301 11/05/21  0700 11/06/21  0545    138 137   K 3.8 4.0 4.4    104 103   CO2 27 26 25   BUN 12 10 13   CREATININE 0.54 0.61 0.59   GLUCOSE 106* 91 143*     COAGS:   Recent Labs     11/04/21  1301 11/05/21  0231 11/05/21  0700   INR 2.5 1.6 1.2         Latasha Cast MD  11/6/21, 8:30 AM

## 2021-11-06 NOTE — OP NOTE
[facility]                                OPERATIVE REPORT    PATIENT NAME: Shayla Webb                       :        1940  MED REC NO:   [id number]                             ROOM:       6410  ACCOUNT NO:   [de-identified]                           ADMIT DATE: 2021  PROVIDER:     Marla Flores    DATE OF PROCEDURE:  2021    PREOPERATIVE DIAGNOSIS:  Left intertrochanteric femur fracture. POSTOPERATIVE DIAGNOSIS:  Left intertrochanteric femur fracture. PROCEDURE PERFORMED:  Cephalomedullary nail fixation of left  intertrochanteric femur fracture, CPT 15479. ATTENDING SURGEON:  Marla Flores DO    ASSISTANTS:  1. Yasmeen Whiteside. Bhavana Leos DO, PGY-3  2. Luis Gustafson, PGY-2    ANESTHESIA:  General.    ESTIMATED BLOOD LOSS:  200 mL. IV FLUIDS:  1000 mL of crystalloid. COMPLICATIONS:  None. SPECIMENS:  None. IMPLANTS:  Smith and Nephew 10 mm x 38 cm InterTAN with 80/75 mm  lag/compression screws. INDICATIONS:  The patient is an 25-year-old female who sustained a  displaced left intertrochanteric femur fracture as well as a fall from  standing height at her home. She was found down after an estimated 1  hour by family and was brought to local ED. Decision was made to  transfer to our facility for a tertiary care. The patient has extensive  cardiac past medical history and was on blood thinners with an INR of  2.5 on presentation. She received vitamin K which lowered her INR to  1.6 and after an echo and exam, they received cardiac clearance. The  details of the injury, the procedure, and a postoperative course was  discussed with the patient and her son at that time, both of they stated  clear understanding and agreed to proceed. Written informed consent was  present and the operative site was marked.     OPERATIVE PROCEDURE:  The patient was transported to the operating room.  General anesthesia was administered by the anesthesia provider without  complication. She was transferred to a radiolucent flat-top table in  the supine position. All bony prominences were well padded. She was  adequately secured to the bed. Left arm was secured over her chest.   Left lower extremity was isolated, scrubbed with Hibiclens followed by  alcohol and prepped and draped in the usual sterile fashion. A time-out  was performed that included all involved parties in correctly  identifying the patient, planned procedure, and operative site. After  everyone agreed, we continued. AP imaging of the hip indicated still  significant displacement through the fracture. We then confirmed  chemical paralysis and inserted a 2 mm wire in the anterior aspect of  the distal femur and applied approximately 15 pounds of skeletal  traction. Radiograph showed significant improvement but minor  adjustment was felt to be necessary; therefore, we percutaneously  inserted a shoulder hook from the lateral aspect of the hip. We have to  manipulate the proximal segment and get an acceptable reduction. We  then used a 2.4 mm K-wire for provisional fixation. We then  percutaneously inserted the guidewire, positioned it on the greater  trochanter until it had the appropriate start point trajectory and  advancement into the intramedullary canal.  The incision was made which  allowed insertion of the opening reamer. Everything was removed and  exchanged for a ball-tip guidewire, which was guided into the correct  place in the distal femur for maximal depth. Measurement was obtained. We then sequentially reamed up to an 11.5 mm reamer which allowed  insertion of a Smith and Nephew 10 mm x 38 cm InterTAN cephalomedullary  nail. Once it was fully seated and appropriately rotated, we made a  lateral incision, and inserted the trocar and guidewire with the  cephalomedullary fixation.   Once it was appropriately

## 2021-11-06 NOTE — CARE COORDINATION
Transitional planning. Spoke to Yani Loo with [facility], they can accept pt if discharged tomorrow or discharged d/t need of bed. Will need COVID test with 72 hours of admission to SNF, will need DAVID  Report: [phone]  FAX: [phone]  Call Yani Loo to inform of discharge and transport time [phone] with admissions at [facility] called, they can accept pt and they are pt's first choice. Had COVID neg test on 11/4 will not need another test if discharged tomorrow. Will need another if discharged on the 8th. Will need DAVID  Report:[phone] [address]  FAX: [phone]  Call [facility] with admissions to notify of discharge.  [phone]

## 2021-11-07 VITALS
RESPIRATION RATE: 14 BRPM | HEIGHT: 59 IN | BODY MASS INDEX: 25.2 KG/M2 | OXYGEN SATURATION: 91 % | WEIGHT: 125 LBS | HEART RATE: 72 BPM | DIASTOLIC BLOOD PRESSURE: 65 MMHG | SYSTOLIC BLOOD PRESSURE: 130 MMHG | TEMPERATURE: 99.4 F

## 2021-11-07 LAB
ABO/RH: NORMAL
ABSOLUTE EOS #: 0.13 K/UL (ref 0–0.44)
ABSOLUTE IMMATURE GRANULOCYTE: 0.07 K/UL (ref 0–0.3)
ABSOLUTE LYMPH #: 0.78 K/UL (ref 1.1–3.7)
ABSOLUTE MONO #: 0.73 K/UL (ref 0.1–1.2)
ANION GAP SERPL CALCULATED.3IONS-SCNC: 7 MMOL/L (ref 9–17)
ANTIBODY SCREEN: NEGATIVE
ARM BAND NUMBER: NORMAL
BASOPHILS # BLD: 0 % (ref 0–2)
BASOPHILS ABSOLUTE: 0.03 K/UL (ref 0–0.2)
BLD PROD TYP BPU: NORMAL
BUN BLDV-MCNC: 20 MG/DL (ref 8–23)
BUN/CREAT BLD: ABNORMAL (ref 9–20)
CALCIUM SERPL-MCNC: 8 MG/DL (ref 8.6–10.4)
CHLORIDE BLD-SCNC: 100 MMOL/L (ref 98–107)
CO2: 25 MMOL/L (ref 20–31)
CREAT SERPL-MCNC: 0.67 MG/DL (ref 0.5–0.9)
CROSSMATCH RESULT: NORMAL
DIFFERENTIAL TYPE: ABNORMAL
DISPENSE STATUS BLOOD BANK: NORMAL
EOSINOPHILS RELATIVE PERCENT: 2 % (ref 1–4)
EXPIRATION DATE: NORMAL
GFR AFRICAN AMERICAN: >60 ML/MIN
GFR NON-AFRICAN AMERICAN: >60 ML/MIN
GFR SERPL CREATININE-BSD FRML MDRD: ABNORMAL ML/MIN/{1.73_M2}
GFR SERPL CREATININE-BSD FRML MDRD: ABNORMAL ML/MIN/{1.73_M2}
GLUCOSE BLD-MCNC: 99 MG/DL (ref 70–99)
HCT VFR BLD CALC: 25.1 % (ref 36.3–47.1)
HEMOGLOBIN: 7.6 G/DL (ref 11.9–15.1)
IMMATURE GRANULOCYTES: 1 %
INR BLD: 1
LYMPHOCYTES # BLD: 9 % (ref 24–43)
MCH RBC QN AUTO: 28.9 PG (ref 25.2–33.5)
MCHC RBC AUTO-ENTMCNC: 30.3 G/DL (ref 28.4–34.8)
MCV RBC AUTO: 95.4 FL (ref 82.6–102.9)
MONOCYTES # BLD: 8 % (ref 3–12)
NRBC AUTOMATED: 0 PER 100 WBC
PDW BLD-RTO: 18.9 % (ref 11.8–14.4)
PLATELET # BLD: 178 K/UL (ref 138–453)
PLATELET ESTIMATE: ABNORMAL
PMV BLD AUTO: 12.5 FL (ref 8.1–13.5)
POTASSIUM SERPL-SCNC: 4.2 MMOL/L (ref 3.7–5.3)
PROTHROMBIN TIME: 10.3 SEC (ref 9.1–12.3)
RBC # BLD: 2.63 M/UL (ref 3.95–5.11)
RBC # BLD: ABNORMAL 10*6/UL
SEG NEUTROPHILS: 80 % (ref 36–65)
SEGMENTED NEUTROPHILS ABSOLUTE COUNT: 7.13 K/UL (ref 1.5–8.1)
SODIUM BLD-SCNC: 132 MMOL/L (ref 135–144)
TRANSFUSION STATUS: NORMAL
UNIT DIVISION: 0
UNIT NUMBER: NORMAL
WBC # BLD: 8.9 K/UL (ref 3.5–11.3)
WBC # BLD: ABNORMAL 10*3/UL

## 2021-11-07 PROCEDURE — 97162 PT EVAL MOD COMPLEX 30 MIN: CPT

## 2021-11-07 PROCEDURE — 97530 THERAPEUTIC ACTIVITIES: CPT

## 2021-11-07 PROCEDURE — 6370000000 HC RX 637 (ALT 250 FOR IP): Performed by: STUDENT IN AN ORGANIZED HEALTH CARE EDUCATION/TRAINING PROGRAM

## 2021-11-07 PROCEDURE — 85610 PROTHROMBIN TIME: CPT

## 2021-11-07 PROCEDURE — 6360000002 HC RX W HCPCS: Performed by: STUDENT IN AN ORGANIZED HEALTH CARE EDUCATION/TRAINING PROGRAM

## 2021-11-07 PROCEDURE — 2700000000 HC OXYGEN THERAPY PER DAY

## 2021-11-07 PROCEDURE — 36415 COLL VENOUS BLD VENIPUNCTURE: CPT

## 2021-11-07 PROCEDURE — 97166 OT EVAL MOD COMPLEX 45 MIN: CPT

## 2021-11-07 PROCEDURE — 97535 SELF CARE MNGMENT TRAINING: CPT

## 2021-11-07 PROCEDURE — 2580000003 HC RX 258: Performed by: STUDENT IN AN ORGANIZED HEALTH CARE EDUCATION/TRAINING PROGRAM

## 2021-11-07 PROCEDURE — 94761 N-INVAS EAR/PLS OXIMETRY MLT: CPT

## 2021-11-07 PROCEDURE — 85025 COMPLETE CBC W/AUTO DIFF WBC: CPT

## 2021-11-07 PROCEDURE — 80048 BASIC METABOLIC PNL TOTAL CA: CPT

## 2021-11-07 RX ADMIN — ENOXAPARIN SODIUM 30 MG: 30 INJECTION SUBCUTANEOUS at 08:46

## 2021-11-07 RX ADMIN — Medication 1000 UNITS: at 08:46

## 2021-11-07 RX ADMIN — SODIUM CHLORIDE, PRESERVATIVE FREE 10 ML: 5 INJECTION INTRAVENOUS at 08:47

## 2021-11-07 RX ADMIN — METOPROLOL TARTRATE 12.5 MG: 25 TABLET ORAL at 08:46

## 2021-11-07 RX ADMIN — AMIODARONE HYDROCHLORIDE 200 MG: 200 TABLET ORAL at 08:46

## 2021-11-07 RX ADMIN — GABAPENTIN 200 MG: 100 CAPSULE ORAL at 08:46

## 2021-11-07 RX ADMIN — CITALOPRAM 20 MG: 20 TABLET, FILM COATED ORAL at 08:46

## 2021-11-07 RX ADMIN — Medication 50 MG: at 08:46

## 2021-11-07 RX ADMIN — ACETAMINOPHEN 1000 MG: 500 TABLET ORAL at 06:24

## 2021-11-07 ASSESSMENT — PAIN DESCRIPTION - ORIENTATION
ORIENTATION: LEFT
ORIENTATION: LEFT

## 2021-11-07 ASSESSMENT — PAIN SCALES - GENERAL
PAINLEVEL_OUTOF10: 5

## 2021-11-07 ASSESSMENT — PAIN DESCRIPTION - PAIN TYPE
TYPE: SURGICAL PAIN;ACUTE PAIN
TYPE: SURGICAL PAIN

## 2021-11-07 ASSESSMENT — PAIN DESCRIPTION - LOCATION
LOCATION: HIP
LOCATION: HIP

## 2021-11-07 ASSESSMENT — PAIN DESCRIPTION - FREQUENCY: FREQUENCY: CONTINUOUS

## 2021-11-07 ASSESSMENT — PAIN DESCRIPTION - DESCRIPTORS: DESCRIPTORS: ACHING;CONSTANT

## 2021-11-07 NOTE — PROGRESS NOTES
PROGRESS NOTE      PATIENT NAME: Peggy Cruz  MEDICAL RECORD NO. [id number]  DATE: 2021  SURGEON: Dr Almanza Cough: Zenaida Bower MD    HD: # 3    ASSESSMENT    Patient Active Problem List   Diagnosis    Compression fracture of T12 vertebra ([address]    Osteoporosis with pathological fracture    New onset atrial fibrillation (HCC)    Nonrheumatic mitral valve regurgitation    Essential hypertension    Normocytic normochromic anemia    Depression    Fall as cause of accidental injury at home as place of occurrence    Closed displaced intertrochanteric fracture of left femur [facility])       MEDICAL DECISION MAKING AND PLAN    1. [id number] [address] [facility] for general diet  2. Coumadin resumed -- f/u AM INR. 3. Follow up Ortho recommendations - WBAT to LLE  4. OK for PT/OT today  5. Likely d/c back to facility today pending results of COVID test       SUBJECTIVE    Peggy Cruz seen and examined at bedside. No acute events overnight. Patient reports she feels well overall. Has some discomfort in her left knee but overall feels like her pain is well controlled.     OBJECTIVE  VITALS: Temp: Temp: 98.1 °F (36.7 °C)Temp  Av °F (36.7 °C)  Min: 97.7 °F (36.5 °C)  Max: 98.3 °F (61.1 °C) BP Systolic (63KXJ), IUF:319 , Min:97 , DU   Diastolic (37NSJ), ZLI:95, Min:49, Max:57   Pulse Pulse  Av.6  Min: 63  Max: 82 Resp Resp  Av.1  Min: 14  Max: 18 Pulse ox SpO2  Av.4 %  Min: 97 %  Max: 100 %    CONSTITUTIONAL: awake, alert, cooperative, no apparent distress  HEAD: atraumatic, normocephalic  EYES: sclera clear, pupils equal and reactive to light  ENT: ears are symmetric, nares patent   HEENT: moist mucous membranes  NECK: Supple, symmetrical, trachea midline  LUNGS: no respiratory distress, no audible wheezing  CARDIOVASCULAR: RRR  ABDOMEN: soft, nontender, nondistended  MUSCULOSKELETAL: limited range of motion of LLE 2/2 pain, neurovascularly intact, dressing c/d/i without strikethrough  NEUROLOGIC: Awake, alert, oriented to name, place and time  EXTREMITIES: peripheral pulses normal  SKIN: normal coloration and turgor    I/O last 3 completed shifts: In: 2699 [P.O.:1420; I.V.:939; Blood:340]  Out: 900 [Urine:900]    Drain/tube output: In: 5157 [P.O.:920; I.V.:289; Blood:340]  Out: 725 [Urine:725]    LAB:  CBC:   Recent Labs     11/04/21  1301 11/04/21  1301 11/05/21  0700 11/05/21  1338 11/05/21  1738 11/06/21  0545 11/06/21  1400   WBC 9.5  --  6.8  --   --  5.7  --    HGB 8.9*   < > 7.8*   < > 7.2* 6.4* 8.1*   HCT 29.2*   < > 27.0*   < > 24.5* 21.8* 26.6*   MCV 95.7  --  98.2  --   --  98.6  --      --  168  --   --  154  --     < > = values in this interval not displayed.      BMP:   Recent Labs     11/04/21  1301 11/05/21  0700 11/06/21  0545    138 137   K 3.8 4.0 4.4    104 103   CO2 27 26 25   BUN 12 10 13   CREATININE 0.54 0.61 0.59   GLUCOSE 106* 91 143*     COAGS:   Recent Labs     11/04/21  1301 11/05/21  0231 11/05/21  0700   INR 2.5 1.6 1.2         Phoenix King DO  11/6/21, 6:46 AM

## 2021-11-07 NOTE — PROGRESS NOTES
bedside today. Okay to reinforce as needed per nursing  -Pain control PO/IV Medication. Attempt to Wean IV medications.    -DVT ppx: Okay for chemical AC from orthopedics  -Ice left hip as needed  -Encourage Incentive Spirometry use  -PT/OT  -Dispo: Likely will DC to SNF today  -Follow up with Dr. Yelena Mcgowan in office in 10 to 14 days  -Please page ortho with any questions    Demarcus Nathan DO  Orthopedic Surgery Resident, PGY-3  [place] [id number], [facility]

## 2021-11-07 NOTE — DISCHARGE SUMMARY
DISCHARGE SUMMARY:    PATIENT NAME:  Cristobal Jacobo  YOB: 1940  MEDICAL RECORD NO. [id number]  DATE: 11/07/21  PRIMARY CARE PHYSICIAN: Nitin Fang MD  ADMIT DATE:  11/4/2021    DISCHARGE DATE:    DISPOSITION:  SNF  ADMITTING DIAGNOSIS:   Intertrochanteric Fracture of Left Femur    DIAGNOSIS:   Patient Active Problem List   Diagnosis    Compression fracture of T12 vertebra ([address]    Osteoporosis with pathological fracture    New onset atrial fibrillation (HCC)    Nonrheumatic mitral valve regurgitation    Essential hypertension    Normocytic normochromic anemia    Depression    Fall as cause of accidental injury at home as place of occurrence    Closed displaced intertrochanteric fracture of left femur ([address]       CONSULTANTS:  Ortho    PROCEDURES:   N/A    HOSPITAL COURSE:   Cristobal Jacobo is a 78 y.o. female who was admitted on 11/4/2021  Hospital Course:  She presented after a [address] [place] [address] Street as a transfer for a fem fx, on Coumadin, INR 2.5.    11/5: CMN insertion w/ Ortho for intertrochanteric L fem neck fx. 5 Vit K given, hg 7.2  11/6: HgB 6.4 (7.2) - 1unit PRBC. Post HgB- 8.1. Coumadin restarted. 11/7: INR    Labs and imaging were followed daily. On day of discharge Cristobal Jacobo  was tolerating a regular diet  had adequate analgeia on oral medications  had no signs of complication. She was deemed medically stable for discharged to [address] [place], [place] [address]ia:        Discharge Vitals:  height is 4' 11\" (1.499 m) and weight is 125 lb (56.7 kg). Her oral temperature is 98.1 °F (36.7 °C). Her blood pressure is 122/56 (abnormal) and her pulse is 70. Her respiration is 15 and oxygen saturation is 97%.    Exam on day of discharge:  CONSTITUTIONAL: awake, alert, cooperative, no apparent distress  HEAD: atraumatic, normocephalic  EYES: sclera clear, pupils equal and reactive to light  ENT: ears are symmetric, nares patent   HEENT: moist mucous membranes  NECK: Supple, symmetrical, trachea midline  LUNGS: no respiratory distress, no audible wheezing  CARDIOVASCULAR: RRR  ABDOMEN: soft, nontender, nondistended  MUSCULOSKELETAL: limited range of motion of LLE 2/2 pain, neurovascularly intact, dressing c/d/i without strikethrough  NEUROLOGIC: Awake, alert, oriented to name, place and time  EXTREMITIES: peripheral pulses normal  SKIN: normal coloration and turgor    LABS:     Recent Labs     11/04/21  1301 11/04/21  1301 11/05/21  0700 11/05/21  1338 11/05/21  1738 11/06/21  0545 11/06/21  1400   WBC 9.5  --  6.8  --   --  5.7  --    HGB 8.9*   < > 7.8*   < > 7.2* 6.4* 8.1*   HCT 29.2*   < > 27.0*   < > 24.5* 21.8* 26.6*     --  168  --   --  154  --      --  138  --   --  137  --    K 3.8  --  4.0  --   --  4.4  --      --  104  --   --  103  --    CO2 27  --  26  --   --  25  --    BUN 12  --  10  --   --  13  --    CREATININE 0.54  --  0.61  --   --  0.59  --     < > = values in this interval not displayed. DIAGNOSTIC TESTS:    Echocardiogram Complete 2D w Doppler w Color    Result Date: 11/5/2021  Transthoracic Echocardiography Report (TTE)  Patient Name YURIY       Date of Study               11/05/2021               [facility]   Date of      1940  Gender                      Female  Birth   Age          78 year(s)  Race                           Room Number  2477        Height:                     59 inch, 149.86 cm   Corporate ID [id number]    Weight:                     125 pounds, 56.7 kg  #   Patient Acct [de-identified]   BSA:          1.51 m^2      BMI:     25.25 kg/m^2  #   MR #         [id number]     Sonographer                 [facility]   Accession #  [id number]  Interpreting Physician      [address]   Fellow                   Referring Nurse                           Practitioner   Interpreting             Referring Physician         Amisha Parker  Fellow  Type of Study   TTE procedure:2D Echocardiogram, M-Mode, Doppler, Color Doppler.   Procedure Date Date: 11/05/2021 Start: 09:09 AM Study Location: [facility] Technical Quality: Adequate visualization Indications:Preop cardiac evaluation. History / Tech. Comments: Procedure explained to patient. HTN Patient Status: Inpatient Height: 59 inches Weight: 125 pounds BSA: 1.51 m^2 BMI: 25.25 kg/m^2 HR: 70 bpm Allergies   - Penicillin. - Codeine.   - Aspirin. CONCLUSIONS Summary Left ventricle is normal in size. Global left ventricular systolic function is hyperdynamic. Calculated ejection [id number] % by Heart Model. Increased velocities of LVOT and AV flow due to hyperdynamic state of LV. Moderate concentric left ventricular hypertrophy. Left atrium is normal in size. Right atrium is normal in size. Normal right ventricular size and function. Aortic valve is sclerotic but opens well. No aortic insufficiency. Peak instantaneous gradient 40 mmHg and mean gradient 21 mmHg. No obvious valvular abnormality. Moderate tricuspid regurgitation. Moderate pulmonary hypertension. Estimated right ventricular systolic pressure is [id number]. No significant pericardial effusion is seen. Signature ----------------------------------------------------------------------------  Electronically signed by Mary Hernandez(Sonographer) on 11/05/2021  10:04 AM ---------------------------------------------------------------------------- ----------------------------------------------------------------------------  Electronically signed by Florentino Cornell(Interpreting physician) on 11/05/2021  10:23 AM ---------------------------------------------------------------------------- FINDINGS Left Atrium Left atrium is normal in size. Left Ventricle Left ventricle is normal in size. Global left ventricular systolic function is hyperdynamic. Calculated ejection [id number] % by Heart Model. Increased velocities of LVOT and AV flow due to hyperdynamic state of LV. Moderate concentric left ventricular hypertrophy.  Right Atrium Right atrium is mmHg             Peak Gradient: 5.95 mmHg  Diastology / Tissue Doppler Septal Wall E' velocity:0.06 m/s Septal Wall E/E':26.5 Lateral Wall E' velocity:0.06 m/s Lateral Wall E/E':26.1    XR FEMUR LEFT (MIN 2 VIEWS)    Result Date: 11/5/2021  EXAMINATION: 2 XRAY VIEWS OF THE LEFT FEMUR 11/5/2021 3:40 pm COMPARISON: 4 November 2021 HISTORY: ORDERING SYSTEM PROVIDED HISTORY: post op please do in pacu TECHNOLOGIST PROVIDED HISTORY: post op please do in pacu FINDINGS: Portable images time stamped at 1534 hours demonstrate interval performance of operative reduction and internal fixation of a comminuted inter trochanteric fracture of the left hip bridged by intramedullary samantha in 2 hip pins. Lesser trochanter is contained is a separate fracture fragment. Distal end of the intramedullary samantha affixed to the femur by a screw. No hardware complication. Fracture alignment anatomic. Expected postoperative soft tissue changes are noted including skin clips around the knee and air in the soft tissues. Interval performance of operative reduction and internal fixation of a comminuted intratrochanteric fracture of the left hip placement of intramedullary samantha and fixative pins. Fracture alignment is satisfactory. XR FEMUR LEFT (MIN 2 VIEWS)    Result Date: 11/4/2021  EXAMINATION: 2 XRAY VIEWS OF THE LEFT FEMUR 11/4/2021 9:20 pm COMPARISON: None. HISTORY: ORDERING SYSTEM PROVIDED HISTORY: pre op TECHNOLOGIST PROVIDED HISTORY: pre op Reason for Exam: pre op FINDINGS: Mildly displaced intertrochanteric left femoral fracture is again noted. The lesser trochanter is dislocated. .  There is no dislocation. Mild degenerative change in the knee and hip. .  Diffuse bone demineralization. Mildly displaced intertrochanteric left femoral fracture.      XR KNEE LEFT (3 VIEWS)    Result Date: 11/5/2021  EXAMINATION: THREE XRAY VIEWS OF THE LEFT KNEE;   XRAY VIEWS OF THE LEFT TIBIA AND FIBULA; THREE XRAY VIEWS OF THE LEFT ANKLE 11/5/2021 2:57 pm COMPARISON: Femur radiographs from earlier today HISTORY: ORDERING SYSTEM PROVIDED HISTORY: fall TECHNOLOGIST PROVIDED HISTORY: fall FINDINGS: Partially imaged left femoral intramedullary nail with overlying postoperative soft tissue gas and cutaneous staples along the lower lateral thigh. There is no definite fracture of the tibia or fibula. The talar dome appears intact. Evaluation of the hindfoot is somewhat degraded by overlying blanket material.  The bones are diffusely demineralized. There is mild lateral soft tissue swelling at the ankle. Recent postoperative changes from placement of a partially imaged left femoral intramedullary nail. The imaged portion of the distal femur and hardware appear intact. No acute fracture of the left lower leg with soft tissue swelling overlying the lateral malleolus. XR TIBIA FIBULA LEFT (2 VIEWS)    Result Date: 11/5/2021  EXAMINATION: THREE XRAY VIEWS OF THE LEFT KNEE;   XRAY VIEWS OF THE LEFT TIBIA AND FIBULA; THREE XRAY VIEWS OF THE LEFT ANKLE 11/5/2021 2:57 pm COMPARISON: Femur radiographs from earlier today HISTORY: ORDERING SYSTEM PROVIDED HISTORY: fall TECHNOLOGIST PROVIDED HISTORY: fall FINDINGS: Partially imaged left femoral intramedullary nail with overlying postoperative soft tissue gas and cutaneous staples along the lower lateral thigh. There is no definite fracture of the tibia or fibula. The talar dome appears intact. Evaluation of the hindfoot is somewhat degraded by overlying blanket material.  The bones are diffusely demineralized. There is mild lateral soft tissue swelling at the ankle. Recent postoperative changes from placement of a partially imaged left femoral intramedullary nail. The imaged portion of the distal femur and hardware appear intact. No acute fracture of the left lower leg with soft tissue swelling overlying the lateral malleolus.      XR TIBIA FIBULA LEFT (2 VIEWS)    Result Date: 11/4/2021  EXAMINATION: THREE XRAY VIEWS OF THE LEFT ANKLE; 2  XRAY VIEWS OF THE LEFT TIBIA AND FIBULA 11/4/2021 9:20 pm COMPARISON: None. HISTORY: ORDERING SYSTEM PROVIDED HISTORY: pain s/p geriatric fall TECHNOLOGIST PROVIDED HISTORY: pain s/p geriatric fall FINDINGS: There is no evidence of acute fracture or dislocation of the left ankle. The ankle mortise is grossly intact. The joint spaces are intact. There is a small plantar bone spur. The soft tissue is within normal limits. There is moderate vascular calcification. No evidence of radiopaque body. There is no acute fracture or dislocation of the left tibia fibula. There are mild-to-moderate tricompartmental degenerative changes in left knee. There is mild suprapatellar joint effusion. The soft tissue is within normal limits. No radiopaque foreign body. No acute abnormality in the left ankle. No acute fracture or dislocation in the left tibia fibula. Degenerative changes in the left knee. Mild suprapatellar joint effusion. XR ANKLE LEFT (MIN 3 VIEWS)    Result Date: 11/5/2021  EXAMINATION: THREE XRAY VIEWS OF THE LEFT KNEE;   XRAY VIEWS OF THE LEFT TIBIA AND FIBULA; THREE XRAY VIEWS OF THE LEFT ANKLE 11/5/2021 2:57 pm COMPARISON: Femur radiographs from earlier today HISTORY: ORDERING SYSTEM PROVIDED HISTORY: fall TECHNOLOGIST PROVIDED HISTORY: fall FINDINGS: Partially imaged left femoral intramedullary nail with overlying postoperative soft tissue gas and cutaneous staples along the lower lateral thigh. There is no definite fracture of the tibia or fibula. The talar dome appears intact. Evaluation of the hindfoot is somewhat degraded by overlying blanket material.  The bones are diffusely demineralized. There is mild lateral soft tissue swelling at the ankle. Recent postoperative changes from placement of a partially imaged left femoral intramedullary nail. The imaged portion of the distal femur and hardware appear intact.  No acute fracture of the left lower leg with soft tissue swelling overlying the lateral malleolus. XR ANKLE LEFT (MIN 3 VIEWS)    Result Date: 11/4/2021  EXAMINATION: THREE XRAY VIEWS OF THE LEFT ANKLE; 2  XRAY VIEWS OF THE LEFT TIBIA AND FIBULA 11/4/2021 9:20 pm COMPARISON: None. HISTORY: ORDERING SYSTEM PROVIDED HISTORY: pain s/p geriatric fall TECHNOLOGIST PROVIDED HISTORY: pain s/p geriatric fall FINDINGS: There is no evidence of acute fracture or dislocation of the left ankle. The ankle mortise is grossly intact. The joint spaces are intact. There is a small plantar bone spur. The soft tissue is within normal limits. There is moderate vascular calcification. No evidence of radiopaque body. There is no acute fracture or dislocation of the left tibia fibula. There are mild-to-moderate tricompartmental degenerative changes in left knee. There is mild suprapatellar joint effusion. The soft tissue is within normal limits. No radiopaque foreign body. No acute abnormality in the left ankle. No acute fracture or dislocation in the left tibia fibula. Degenerative changes in the left knee. Mild suprapatellar joint effusion. XR ANKLE RIGHT (MIN 3 VIEWS)    Result Date: 11/5/2021  EXAMINATION: THREE XRAY VIEWS OF THE RIGHT ANKLE 11/5/2021 5:57 pm COMPARISON: None. HISTORY: ORDERING SYSTEM PROVIDED HISTORY: pain, fall TECHNOLOGIST PROVIDED HISTORY: pain, fall FINDINGS: There is no acute fracture or dislocation of the right ankle. The ankle mortise is grossly intact. The joint spaces are intact. There is a small plantar bone spur. There is soft tissue swelling, lateral more than medial. No radiopaque foreign body. There is moderate vascular calcification. No acute fracture or dislocation in the right ankle.  Soft tissue swelling, lateral more than medial.     CT HEAD WO CONTRAST    Result Date: 11/4/2021  EXAMINATION: CT OF THE HEAD WITHOUT CONTRAST; CT OF THE CERVICAL SPINE WITHOUT CONTRAST 11/4/2021 1:39 pm TECHNIQUE: CT of the head was performed without the administration of intravenous contrast. Dose modulation, iterative reconstruction, and/or weight based adjustment of the mA/kV was utilized to reduce the radiation dose to as low as reasonably achievable.; CT of the cervical spine was performed without the administration of intravenous contrast. Multiplanar reformatted images are provided for review. Dose modulation, iterative reconstruction, and/or weight based adjustment of the mA/kV was utilized to reduce the radiation dose to as low as reasonably achievable. COMPARISON: None HISTORY: ORDERING SYSTEM PROVIDED HISTORY: fall TECHNOLOGIST PROVIDED HISTORY: fall Reason for Exam: fall Acuity: Acute Type of Exam: Initial; ORDERING SYSTEM PROVIDED HISTORY: fall TECHNOLOGIST PROVIDED HISTORY: fall Decision Support Exception - unselect if not a suspected or confirmed emergency medical condition->Emergency Medical Condition (MA) Reason for Exam: fall Acuity: Acute Type of Exam: Initial FINDINGS: CT head: BRAIN/VENTRICLES: There is no acute intracranial hemorrhage, mass effect or midline shift. No abnormal extra-axial fluid collection. The gray-white differentiation is maintained without evidence of an acute infarct. There is no evidence of hydrocephalus. Patient has scattered hypodensity in the white matter consistent with significant chronic white matter ischemia most significant in the left frontal lobe. Calcification in the basal ganglia noted. Calcification of the cavernous carotid arteries and vertebral arteries is present. ORBITS: The visualized portion of the orbits demonstrate no acute abnormality. SINUSES: The visualized paranasal sinuses and mastoid air cells demonstrate no acute abnormality. SOFT TISSUES/SKULL: No acute abnormality of the visualized skull or soft tissues. CT C-spine: BONES/ALIGNMENT: There is no acute fracture or traumatic malalignment. Mild dextroscoliotic curvature is noted. DEGENERATIVE CHANGES: The patient has severe degenerative and degenerative disc changes C5 through C7 with appearance of auto fusion at the C5-C6 and C6-C7 levels. Severe bilateral neural foraminal narrowing is present C6-C7 and C7-T1. Moderate atlantoaxial degenerative changes are present. The patient has considerable facet changes. Mild canal stenosis is present C6-C7. SOFT TISSUES: There is no prevertebral soft tissue swelling. Lung apices are unremarkable. CT head: No acute intracranial abnormality. Senescent changes including significant chronic microvascular change. Atherosclerotic disease. CT C-spine: No acute fracture or traumatic malalignment. Patient has significant degenerative and degenerative disc disease more significant in the lower cervical spine with canal stenosis C6-C7 and severe neural foraminal narrowing as above. CT CERVICAL SPINE WO CONTRAST    Result Date: 11/4/2021  EXAMINATION: CT OF THE HEAD WITHOUT CONTRAST; CT OF THE CERVICAL SPINE WITHOUT CONTRAST 11/4/2021 1:39 pm TECHNIQUE: CT of the head was performed without the administration of intravenous contrast. Dose modulation, iterative reconstruction, and/or weight based adjustment of the mA/kV was utilized to reduce the radiation dose to as low as reasonably achievable.; CT of the cervical spine was performed without the administration of intravenous contrast. Multiplanar reformatted images are provided for review. Dose modulation, iterative reconstruction, and/or weight based adjustment of the mA/kV was utilized to reduce the radiation dose to as low as reasonably achievable.  COMPARISON: None HISTORY: ORDERING SYSTEM PROVIDED HISTORY: fall TECHNOLOGIST PROVIDED HISTORY: fall Reason for Exam: fall Acuity: Acute Type of Exam: Initial; ORDERING SYSTEM PROVIDED HISTORY: fall TECHNOLOGIST PROVIDED HISTORY: fall Decision Support Exception - unselect if not a suspected or confirmed emergency medical condition->Emergency Medical Condition (MA) Reason for Exam: fall Acuity: Acute Type of Exam: Initial FINDINGS: CT head: BRAIN/VENTRICLES: There is no acute intracranial hemorrhage, mass effect or midline shift. No abnormal extra-axial fluid collection. The gray-white differentiation is maintained without evidence of an acute infarct. There is no evidence of hydrocephalus. Patient has scattered hypodensity in the white matter consistent with significant chronic white matter ischemia most significant in the left frontal lobe. Calcification in the basal ganglia noted. Calcification of the cavernous carotid arteries and vertebral arteries is present. ORBITS: The visualized portion of the orbits demonstrate no acute abnormality. SINUSES: The visualized paranasal sinuses and mastoid air cells demonstrate no acute abnormality. SOFT TISSUES/SKULL: No acute abnormality of the visualized skull or soft tissues. CT C-spine: BONES/ALIGNMENT: There is no acute fracture or traumatic malalignment. Mild dextroscoliotic curvature is noted. DEGENERATIVE CHANGES: The patient has severe degenerative and degenerative disc changes C5 through C7 with appearance of auto fusion at the C5-C6 and C6-C7 levels. Severe bilateral neural foraminal narrowing is present C6-C7 and C7-T1. Moderate atlantoaxial degenerative changes are present. The patient has considerable facet changes. Mild canal stenosis is present C6-C7. SOFT TISSUES: There is no prevertebral soft tissue swelling. Lung apices are unremarkable. CT head: No acute intracranial abnormality. Senescent changes including significant chronic microvascular change. Atherosclerotic disease. CT C-spine: No acute fracture or traumatic malalignment. Patient has significant degenerative and degenerative disc disease more significant in the lower cervical spine with canal stenosis C6-C7 and severe neural foraminal narrowing as above.      XR CHEST PORTABLE    Result Date: 11/4/2021  EXAMINATION: ONE XRAY VIEW OF THE CHEST 11/4/2021 12:47 pm COMPARISON: 07/30/2021. HISTORY: ORDERING SYSTEM PROVIDED HISTORY: fall TECHNOLOGIST PROVIDED HISTORY: fall Reason for Exam: Fall; left hip pain, limited movement Acuity: Acute Type of Exam: Initial FINDINGS: Tortuosity of the descending aorta was noted. Mild cardiomegaly was noted without pneumonia, interstitial edema or pleural effusions. Moderate degenerative osteo arthritic changes involve the right glenohumeral joint. Multiple metallic surgical clips were noted in the left upper abdominal quadrant. Post vertebroplasty changes involve T12 and L1. No significant change has occurred from 07/30/2021. Mild cardiomegaly without pneumonia, interstitial edema or pleural effusions. No significant change from 07/30/2021. CT HIP LEFT WO CONTRAST    Result Date: 11/5/2021  EXAMINATION: CT OF THE LEFT HIP WITHOUT CONTRAST 11/4/2021 11:07 pm TECHNIQUE: CT of the left hip was performed without the administration of intravenous contrast.  Multiplanar reformatted images are provided for review. Dose modulation, iterative reconstruction, and/or weight based adjustment of the mA/kV was utilized to reduce the radiation dose to as low as reasonably achievable. COMPARISON: Hip 2-3 views with pelvis left November 4, 2021. HISTORY ORDERING SYSTEM PROVIDED HISTORY: pre op planning TECHNOLOGIST PROVIDED HISTORY: Thin cuts in all planes (coronal, sagittal, axial) pre op planning FINDINGS: Bones: Distracted comminuted, mildly impacted, intertrochanteric acute fracture of the left femur is noted which extends into the base of the left femoral neck. No further evidence of acute fracture is identified. Bone mineralization is within normal limits. Bone alignment is otherwise unremarkable. Soft Tissue: Fascial and surrounding subcutaneous fat stranding related inflammation is seen in region of the proximal left femoral acute fracture.  Skeletal muscle bundles, fascial planes and subcutaneous tissues are otherwise unremarkable in appearance. Shaikh catheter is noted within a mildly distended and grossly unremarkable urinary bladder. Joint: Mild bilateral hip joint space narrowing is noted without significant osteophytosis or further evidence of degenerative change. 1. Left femoral intratrochanteric distracted, mildly impacted, comminuted acute fracture which extends into and involves the base of the left femoral neck. 2. 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 FLUORO FOR SURGICAL PROCEDURES    Result Date: 11/5/2021  Radiology exam is complete. No Radiologist dictation. Please follow up with ordering provider. XR HIP 2-3 VW W PELVIS LEFT    Result Date: 11/5/2021  EXAMINATION: ONE XRAY VIEW OF THE PELVIS AND TWO XRAY VIEWS LEFT HIP 11/5/2021 2:40 pm COMPARISON: 11/04/2021. HISTORY: ORDERING SYSTEM PROVIDED HISTORY: post op please do in pacu TECHNOLOGIST PROVIDED HISTORY: post op please do in pacu FINDINGS: A dynamic compression screw internal fixation device bridges a comminuted intertrochanteric fracture of the left proximal femur. Other than rotation of the lesser trochanter, the fracture fragments were in close approximation and alignment. Metallic surgical clips were noted in the pelvis and left lower abdominal quadrant. A catheter was noted in the urinary bladder. A dynamic compression screw internal fixation device bridges a comminuted intertrochanteric fracture of the left proximal femur. Other than rotation of the lesser trochanter fracture fragment, all fragments were in close approximation and alignment. XR HIP 2-3 VW W PELVIS LEFT    Result Date: 11/4/2021  EXAMINATION: ONE XRAY VIEW OF THE PELVIS AND TWO XRAY VIEWS LEFT HIP 11/4/2021 1:47 pm COMPARISON: None.  HISTORY: ORDERING SYSTEM PROVIDED HISTORY: fall hip pain TECHNOLOGIST PROVIDED HISTORY: fall hip pain Reason for Exam: Fall; left hip pain, limited movement Acuity: Acute Type of Exam: Initial FINDINGS: Intertrochanteric fracture left femoral neck. No dislocation. No other fractures. Intertrochanteric fracture left femoral neck       DISCHARGE INSTRUCTIONS     Discharge Medications:        Medication List      START taking these medications    vitamin D 1.25 MG (97508 UT) Caps capsule  Commonly known as: ERGOCALCIFEROL  Take 1 capsule by mouth once a week for 8 doses        ASK your doctor about these medications    alendronate 70 MG tablet  Commonly known as: FOSAMAX     amiodarone 200 MG tablet  Commonly known as: CORDARONE  Take 1 tablet by mouth daily     b complex vitamins capsule     citalopram 20 MG tablet  Commonly known as: CELEXA     furosemide 20 MG tablet  Commonly known as: LASIX  Take 1 tablet by mouth daily as needed (swelling, weight gain, sob)     gabapentin 100 MG capsule  Commonly known as: NEURONTIN     Gaviscon 80-14.2 MG Chew  Generic drug: Alum Hydroxide-Mag Trisilicate     metoprolol tartrate 25 MG tablet  Commonly known as: LOPRESSOR  Take 0.5 tablets by mouth 2 times daily     sodium chloride 5 % ophthalmic solution  Commonly known as:      Tylenol 8 Hour Arthritis Pain 650 MG extended release tablet  Generic drug: acetaminophen     Tylenol PM Extra Strength  MG tablet  Generic drug: diphenhydrAMINE-APAP (sleep)     vitamin B-6 50 MG tablet  Commonly known as: PYRIDOXINE     vitamin D 25 MCG (1000 UT) Tabs tablet  Commonly known as: CHOLECALCIFEROL     warfarin 2 MG tablet  Commonly known as: COUMADIN  Take as directed. If you are unsure how to take this medication, talk to your nurse or doctor. Original instructions:  Take 1 tablet by mouth daily Follow up with INR in 3 days           Where to Get Your Medications      These medications were sent to [facility], [address] [place] [address]  [address], [place] [address]    Phone: [phone]   vitamin D 1.25 MG (64048 UT) Caps capsule       Diet:

## 2021-11-07 NOTE — CARE COORDINATION
Transitional planning. Pt has discharge order, requested 1pm transport with [facility]    [facility] with [facility] confirmed pt ambulance transport with [facility] ambulance at 1:30p today. Informed pt's RN, Raiza Oshea of transport time. 3525 informed Ayla with [facility] of transport time. Report:[phone] 400 or 500 wing  FAX: [phone], faxed AVS/MEG    [phone] with [facility], [phone], that pt will be going to first choice SNF, [facility]. 1300 informed pt of transport time, LM for pt's son Salinas Ansari to call CM back so he can be informed of time as well.  Did not leave any confidential information- no voice mail identification

## 2021-11-07 NOTE — PROGRESS NOTES
Pt dc to rehab per ambulance left with all her belongings called report  [facility] [facility] paperwork sent with pt.

## 2021-11-07 NOTE — PLAN OF CARE
Problem: Falls - Risk of:  Goal: Will remain free from falls  Description: Will remain free from falls  11/7/2021 0334 by Ashly Burroughs RN  Outcome: Ongoing     Problem: Falls - Risk of:  Goal: Absence of physical injury  Description: Absence of physical injury  11/7/2021 0334 by Ashly Burroughs RN  Outcome: Ongoing     Problem: Skin Integrity:  Goal: Will show no infection signs and symptoms  Description: Will show no infection signs and symptoms  11/7/2021 0334 by Ashly Burroughs RN  Outcome: Ongoing

## 2021-11-07 NOTE — PROGRESS NOTES
[facility]  Progress Note                   Date:   11/7/2021  Patient name: Ellis Francisco  Date of admission:  11/4/2021  7:37 PM  MRN:   [id number]  YOB: 1940  PCP: Sondra Owens MD    Reason for Admission: Left hip pain [M25.552]  Fall, initial encounter [W19. XXXA]  Closed left hip fracture, initial encounter ([facility] Vernel Oswaldo  Fall as cause of accidental injury in home as place of occurrence, initial encounter [W19. Chelsey Sibley, Y92.009]    Subjective:       Clinical Changes /Abnormalities:Patient seen and examined in room after discussion with RN. Denies chest pain and SOB. POD 2 Ortho surgery for left femur fracture. SR on tele      Review of Systems    Medications:   Scheduled Meds:   enoxaparin  30 mg SubCUTAneous BID    warfarin (COUMADIN) daily dosing (placeholder)   Other RX Placeholder    [START ON 11/8/2021] warfarin  2 mg Oral Once per day on Mon Wed Fri    warfarin  4 mg Oral Once per day on Sun Tue Thu Sat    amiodarone  200 mg Oral Daily    citalopram  20 mg Oral Daily    gabapentin  200 mg Oral TID    metoprolol tartrate  12.5 mg Oral BID    vitamin B-6  50 mg Oral Daily    Vitamin D  1,000 Units Oral BID    sodium chloride flush  5-40 mL IntraVENous 2 times per day    polyethylene glycol  17 g Oral Daily    acetaminophen  1,000 mg Oral 3 times per day     Continuous Infusions:   sodium chloride       CBC:   Recent Labs     11/05/21  0700 11/05/21  1338 11/06/21  0545 11/06/21  1400 11/07/21  0618   WBC 6.8  --  5.7  --  8.9   HGB 7.8*   < > 6.4* 8.1* 7.6*     --  154  --  178    < > = values in this interval not displayed. BMP:    Recent Labs     11/05/21  0700 11/06/21  0545 11/07/21  0618    137 132*   K 4.0 4.4 4.2    103 100   CO2 26 25 25   BUN 10 13 20   CREATININE 0.61 0.59 0.67   GLUCOSE 91 143* 99     Hepatic:No results for input(s): AST, ALT, ALB, BILITOT, ALKPHOS in the last 72 hours.   Troponin:   Recent Labs 11/04/21  1301   TROPHS 15*     BNP: No results for input(s): BNP in the last 72 hours. Lipids: No results for input(s): CHOL, HDL in the last 72 hours. Invalid input(s): LDLCALCU  INR:   Recent Labs     11/05/21  0231 11/05/21  0700 11/07/21  0618   INR 1.6 1.2 1.0       Diagnostic data  Echocardiogram 11/05/2021:  Left ventricle is normal in size. Global left ventricular systolic function   is hyperdynamic. Calculated ejection [id number] % by Heart Model. Increased velocities of LVOT and AV flow due to hyperdynamic state of LV. Moderate concentric left ventricular hypertrophy. Left atrium is normal in size. Right atrium is normal in size. Normal right ventricular size and function. Aortic valve is sclerotic but opens well. No aortic insufficiency. Peak instantaneous gradient 40 mmHg and mean gradient 21 mmHg. No obvious valvular abnormality. Moderate tricuspid regurgitation. Moderate pulmonary hypertension. Estimated right ventricular systolic   pressure is 79CDNG. No significant pericardial effusion is seen. Echo  -DARRIN done on 7/31/2021 showed an ejection fraction of 65 to 70%, severe LVH with minimal outflow obstruction, Mitral Valve: Structurally normal. 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 Moderate to severe regurgitation, Aortic Valve: Increased velocity of LVOT and AV flow due to hyperdynamic state of the left Ventricle. Objective:   Vitals: /67   Pulse 72   Temp 98 °F (36.7 °C) (Oral)   Resp 18   Ht 4' 11\" (1.499 m)   Wt 125 lb (56.7 kg)   SpO2 95%   BMI 25.25 kg/m²   General appearance: alert and cooperative with exam  HEENT: Head: Normocephalic, no lesions, without obvious abnormality.   Neck:no JVD, trachea midline, no adenopathy  Lungs: Clear to auscultation  Heart: Regular rate and rhythm, s1/s2 auscultated, no murmurs  SR on tele  HR 68  Abdomen: soft, non-tender, bowel sounds active  Extremities: no edema  Neurologic: not done        Assessment / Acute Cardiac Problems:   1. Paroxysmal atrial fibrillation diagnosed July 2021(on Coumadin, Lopressor, amiodarone) currently in NSR. 2. Moderate to severe MR on DARRIN, repeat echo showed only mild to moderate MR, currently stable with no signs of volume overload. 3. Hypertension  4. Hyperlipidemia  5. History of CVA      Patient Active Problem List:     Compression fracture of T12 vertebra ([address]     Osteoporosis with pathological fracture     New onset atrial fibrillation (HCC)     Nonrheumatic mitral valve regurgitation     Essential hypertension     Normocytic normochromic anemia     Depression     Fall as cause of accidental injury at home as place of occurrence     Closed displaced intertrochanteric fracture of left femur ([address]      Plan of Treatment:   1. Fall s/p left femur CMN POD 1 orders per Ortho surgery. 2. PAF  SR on tele. Continue PO amiodarone and BB. Coumadin restarted today. 3. HTN controlled. Continue BB   4. Keep K > 4.0 and Mag > 2.0 K 4.2 today Mag 2.1 yesterday. 5. Rec Keep Hgb > 8.0  7.8 today  PRBC received yesterday per primary. 6. Rest of care managed per Primary Team.   7. Follow up with Primary Cardiologist in [place] after discharge. 8. Okay for discharge to facility from CV stand point. Cardiology will sign off. Call with questions/concerns.      Electronically signed by GOYO Reddy NP on 11/7/2021 at 9:32 AM  [address]  [phone]

## 2021-11-07 NOTE — PROGRESS NOTES
Occupational Therapy   Occupational Therapy Initial Assessment  Date: 2021   Patient Name: Gabriella Talavera  MRN: [id number]     : 1940    Date of Service: 2021  Chief Complaint   Patient presents with   Clinton Sarahy Fall    Hip Pain     lt hip fx       Discharge Recommendations:    Further therapy recommended at discharge. Assessment   Performance deficits / Impairments: Decreased functional mobility ; Decreased ADL status; Decreased high-level IADLs; Decreased endurance; Decreased balance  Assessment: pt would be unsafe to return to prior living arrangement at this time due to requiring max A for all functional transfers at this time. pt would benefit from further therapy at discharge in order to increase safety and independence. Treatment Diagnosis: fall  Prognosis: Good  Decision Making: Medium Complexity  Patient Education: pt ed on POC, purpose of eval, importance of movement, safety during functionla transfers/functional mobility, WB status, benefits of therapy. goodreturn  REQUIRES OT FOLLOW UP: Yes  Activity Tolerance  Activity Tolerance: Patient Tolerated treatment well; Patient limited by pain  Safety Devices  Safety Devices in place: Yes  Type of devices: Gait belt; Bed alarm in place; Patient at risk for falls; Left in bed; Call light within reach  Restraints  Initially in place: No           Patient Diagnosis(es): The primary encounter diagnosis was Left hip pain. Diagnoses of Fall, initial encounter and Closed left hip fracture, initial encounter [facility]) were also pertinent to this visit. has a past medical history of Chronic back pain, Neuropathy, and Stroke ([facility] has a past surgical history that includes Hysterectomy; Appendectomy; Abdomen surgery (); Fixation Kyphoplasty (7/3/14); Spine surgery (N/A, 5/10/2021); and Femur Surgery (Left, 2021).     Treatment Diagnosis: fall      Restrictions  Restrictions/Precautions  Restrictions/Precautions: Weight Bearing, Fall Risk  Required Braces or Orthoses?: No  Lower Extremity Weight Bearing Restrictions  Left Lower Extremity Weight Bearing: Weight Bearing As Tolerated  Position Activity Restriction  Other position/activity restrictions: WBAT LLE, up with assist    Subjective   General  Patient assessed for rehabilitation services?: Yes  Family / Caregiver Present: No  Diagnosis: fall  General Comment  Comments: RN ok'd for therapy this morning. pt agreeable to participate in session and cooperative throughout  Patient Currently in Pain: Yes (denies at rest)  Pain Assessment  Pain Assessment: 0-10  Pain Level: 5  Pain Type: Surgical pain; Acute pain  Pain Location: Hip  Pain Orientation: Left  Non-Pharmaceutical Pain Intervention(s): Ambulation/Increased Activity; Distraction;  Therapeutic presence  Response to Pain Intervention: Patient Satisfied    Social/Functional History  Social/Functional History  Lives With: Alone  Type of Home: House  Home Layout: Two level, Able to Live on Main level with bedroom/bathroom  Home Access: Stairs to enter with rails  Entrance Stairs - Number of Steps: 4  Entrance Stairs - Rails: Right  Bathroom Shower/Tub: Tub/Shower unit  Bathroom Toilet: Standard  Bathroom Equipment: Shower chair, Grab bars in shower  Home Equipment: Cane, Rolling walker (pt reported using cane majority of time)  ADL Assistance: [address]: Independent  Homemaking Responsibilities: Yes (pt reported son cleans)  Meal Prep Responsibility: Secondary  Laundry Responsibility: Secondary  Cleaning Responsibility: No  Ambulation Assistance: Independent  Transfer Assistance: Independent  Active : No  Patient's  Info: son drives  Occupation: Retired  Type of occupation:   Leisure & Hobbies: dot to dot  Additional Comments: pt reported son checks in on patient everyday       Objective   Vision: Impaired  Vision Exceptions: Wears glasses at all times  Hearing: Exceptions to [facility]  Hearing Exceptions: Hard of hearing/hearing concerns    Orientation  Overall Orientation Status: Within Functional Limits     Balance  Sitting Balance: Contact guard assistance (~10 minutes on eOB)  Standing Balance: Maximum assistance (with RW)  Standing Balance  Time: ~1 minute  Activity: pt completed sit>stand transfer 2x for sheet management  Comment: pt reported increased pain in L LE during standing and unable to take steps  ADL  Feeding: Modified independent   Grooming: Supervision  UE Bathing: Stand by assistance  LE Bathing: Moderate assistance  UE Dressing: Stand by assistance  LE Dressing: Moderate assistance  Toileting: Maximum assistance  Additional Comments: OT facilitated pt in donning/doffing brief and completing ld-care while supine in bed, pt able to assist with rolling  Tone RUE  RUE Tone: Normotonic  Tone LUE  LUE Tone: Normotonic  Coordination  Movements Are Fluid And Coordinated: Yes     Bed mobility  Rolling to Left: Maximum assistance  Rolling to Right: Maximum assistance  Supine to Sit: Maximum assistance; 2 Person assistance  Sit to Supine: Maximum assistance; 2 Person assistance  Scooting: Maximal assistance  Transfers  Sit to stand: Maximum assistance; 2 Person assistance  Stand to sit: Maximum assistance; 2 Person assistance  Transfer Comments: with RW     Cognition  Overall Cognitive Status: Exceptions  Following Commands:  Follows multistep commands with repitition  Attention Span: Difficulty dividing attention  Safety Judgement: Decreased awareness of need for assistance  Insights: Decreased awareness of deficits  Initiation: Requires cues for some  Sequencing: Requires cues for some        Sensation  Overall Sensation Status: WFL        LUE AROM (degrees)  LUE AROM : Exceptions  L Shoulder Flexion 0-180: 0-90  L Elbow Flexion 0-145: WFL  L Wrist Flexion 0-80: WFL  L Wrist Extension 0-70: WFL  Left Hand AROM (degrees)  Left Hand AROM: WFL  RUE AROM (degrees)  RUE AROM : Exceptions  R Shoulder Flexion 0-180: 0-90  R Elbow Flexion 0-145: WFL  R Wrist Flexion 0-80: WFL  R Wrist Extension 0-70: WFL  Right Hand AROM (degrees)  Right Hand AROM: WFL  LUE Strength  Gross LUE Strength: Exceptions to [facility]  L Hand General: 4/5  RUE Strength  Gross RUE Strength: Exceptions to [facility]  R Hand General: 4/5         Plan   Plan  Times per week: 3-4x/wk    AM-PAC Score        AM-PAC Inpatient Daily Activity Raw Score: 17 (11/07/21 1313)  AM-PAC Inpatient ADL T-Scale Score : 37.26 (11/07/21 1313)  ADL Inpatient CMS 0-100% Score: 50.11 (11/07/21 1313)  ADL Inpatient CMS G-Code Modifier : CK (11/07/21 1313)    Goals  Short term goals  Time Frame for Short term goals: pt will, by discharge  Short term goal 1: complete LB ADLs with min A, set up and AE, as needed  Short term goal 2: complete UB ADLs and grooming tasks with mod I  Short term goal 3: dem mod A during bed mobility in order to increase independence  Short term goal 4: dem mod A x1 during functional transfers with LRD in order to increase ability to complete functional mobility  Short term goal 5: increase activity tolerance to 20+ minutes in order to participate in daily tasks       Therapy Time   Individual Concurrent Group Co-treatment   Time In 0818         Time Out 0847         Minutes 29      co-eval with PT    Timed Code Treatment Minutes: Mic Haines 27, OTR/L

## 2021-11-07 NOTE — PROGRESS NOTES
Physical Therapy    Facility/Department: [address] ORTHO/MED SURG  Initial Assessment    NAME: Jose Grove  : 1940  MRN: [id number]  Chief Complaint   Patient presents with   Pili Safer Fall    Hip Pain     lt hip fx     Date of Service: 2021    Discharge Recommendations: Further therapy recommended at discharge. PT Equipment Recommendations  Equipment Needed: No (unsafe to attempt transfers or ambulation without assistance)    Assessment   Body structures, Functions, Activity limitations: Decreased functional mobility ; Decreased balance; Decreased endurance; Decreased strength; Increased pain; Decreased ROM; Decreased posture  Assessment: The pt required Max A x2 for supine to sit and sit to stand transfers, unable to ambulate due to poor standing balance. Recommed continued PT to address deficits as pt is unsafe to return to prior living arrangement due to level of assistance required for functional mobility. Prognosis: Good  Decision Making: Medium Complexity  PT Education: Goals; PT Role; Plan of Care; Functional Mobility Training; Transfer Training  Barriers to Learning: [place]  REQUIRES PT FOLLOW UP: Yes  Activity Tolerance  Activity Tolerance: Patient limited by endurance; Patient limited by pain       Patient Diagnosis(es): The primary encounter diagnosis was Left hip pain. Diagnoses of Fall, initial encounter and Closed left hip fracture, initial encounter [facility]) were also pertinent to this visit. has a past medical history of Chronic back pain, Neuropathy, and Stroke ([address] has a past surgical history that includes Hysterectomy; Appendectomy; Abdomen surgery (); Fixation Kyphoplasty (7/3/14); Spine surgery (N/A, 5/10/2021); and Femur Surgery (Left, 2021).     Restrictions  Restrictions/Precautions  Restrictions/Precautions: Fall Risk, Weight Bearing  Position Activity Restriction  Other position/activity restrictions: WBAT LLE  Vision/Hearing  Vision: Impaired  Vision Exceptions: Wears glasses at all times  Hearing: Exceptions to [facility]  Hearing Exceptions: Hard of hearing/hearing concerns     Subjective  General  Patient assessed for rehabilitation services?: Yes  Response To Previous Treatment: Not applicable  Family / Caregiver Present: No  Follows Commands: Impaired (required repetition)  Subjective  Subjective: RN and pt agreeable to PT. Pt supine in bed upon arrival, pleasant and cooperative throughout. Pt on 1L O2 via NC. Pain Screening  Patient Currently in Pain: Denies (denies pain at rest, reports pain 5/10 in LLE with mobility)  Vital Signs  Patient Currently in Pain: Denies (denies pain at rest, reports pain 5/10 in LLE with mobility)       Orientation  Orientation  Overall Orientation Status: Within Functional Limits  Social/Functional History  Social/Functional History  Lives With: Alone  Type of Home: House  Home Layout: Two level, Able to Live on Main level with bedroom/bathroom  Home Access: Stairs to enter with rails  Entrance Stairs - Number of Steps: 4  Entrance Stairs - Rails: Right  Bathroom Shower/Tub: Tub/Shower unit  Bathroom Toilet: Standard  Bathroom Equipment: Shower chair, Grab bars in shower  Home Equipment: Cane, Rolling walker (pt reported using cane majority of time)  ADL Assistance: [address]: Independent  Homemaking Responsibilities: Yes (pt reported son cleans)  Meal Prep Responsibility: Secondary  Laundry Responsibility: Secondary  Cleaning Responsibility: No  Ambulation Assistance: Independent  Transfer Assistance: Independent  Active : No  Patient's  Info: son drives  Occupation: Retired  Type of occupation:   Leisure & Hobbies: dot to dot  Additional Comments: pt reported son checks in on patient everyday  Cognition   Cognition  Overall Cognitive Status: Exceptions  Following Commands:  Follows multistep commands with repitition  Attention Span: Difficulty dividing attention  Safety Judgement: Decreased awareness of need for assistance  Insights: Decreased awareness of deficits  Initiation: Requires cues for some  Sequencing: Requires cues for some    Objective          Joint Mobility  Spine: WFL, forward head posture  ROM RLE: AROM WFL  ROM LLE: AAROM WFL  ROM RUE: AROM WFL  ROM LUE: AROM WFL  Strength RLE  Strength RLE: WFL  Strength LLE  Comment: grossly 2+/5 due to pain  Strength RUE  Strength RUE: WFL  Strength LUE  Strength LUE: WFL  Tone RLE  RLE Tone: Normotonic  Tone LLE  LLE Tone: Normotonic  Motor Control  Gross Motor?: WFL  Sensation  Overall Sensation Status: WFL (pt denies numbness and tingling)  Bed mobility  Rolling to Left: Maximum assistance  Rolling to Right: Maximum assistance  Supine to Sit: Maximum assistance; 2 Person assistance  Sit to Supine: Maximum assistance; 2 Person assistance  Scooting: Maximal assistance  Comment: HOB elevated ~30 degrees, use of bed rail, increased time to complete bed mobility  Transfers  Sit to Stand: Maximum Assistance; 2 Person Assistance  Stand to sit: Maximum Assistance  Comment: Performed sit to stand transfer 2x with RW, pt required RW to pull to stand, required Max A to maintain standing  Ambulation  Ambulation?: No (unsafe to attempt due to poor standing balance)  Stairs/Curb  Stairs?: No     Balance  Posture: Fair  Sitting - Static: Fair; -  Sitting - Dynamic: Poor; +  Standing - Static: Poor  Comments: Pt sat EOB ~8 minutes with mod A initially, progressing to CGA with established balance and cues for upright posture; Standing balance assessed with RW        Plan   Plan  Times per week: 5-6x/wk  Current Treatment Recommendations: Strengthening, ROM, Balance Training, Functional Mobility Training, Gait Training, Transfer Training, Endurance Training, Home Exercise Program, Safety Education & Training, Patient/Caregiver Education & Training, Wheelchair Mobility Training  Safety Devices  Type of devices: Nurse notified, Call light within reach, Gait belt, Left in bed, Bed alarm in place  Restraints  Initially in place: No      AM-PAC Score  AM-PAC Inpatient Mobility Raw Score : 9 (11/07/21 1252)  AM-PAC Inpatient T-Scale Score : 30.55 (11/07/21 1252)  Mobility Inpatient CMS 0-100% Score: 81.38 (11/07/21 1252)  Mobility Inpatient CMS G-Code Modifier : CM (11/07/21 1252)          Goals  Short term goals  Time Frame for Short term goals: 14 visits  Short term goal 1: Perform bed mobility with Min A  Short term goal 2: Perform sit to stand transfer with Min A  Short term goal 3: Ambulate 50ft with RW and Min A  Short term goal 4: Propel wheelchair 200ft with supervision  Short term goal 5: Demo Fair- dynamic standing balance with AD to decrease risk of falls       Therapy Time   Individual Concurrent Group Co-treatment   Time In 0818         Time Out 0848         Minutes 30         Timed Code Treatment Minutes: 12 Minutes       Fina Medina, PT

## 2021-11-08 ENCOUNTER — TELEPHONE (OUTPATIENT)
Dept: PHARMACY | Age: 81
End: 2021-11-08

## 2021-11-08 NOTE — TELEPHONE ENCOUNTER
Patients son called to inform CC that patient was in [place]. CC will contact them to let them know that CC follows wafarin dosing.

## 2021-11-08 NOTE — TELEPHONE ENCOUNTER
Called Nurse for Patient about dosing instructions for Karla PETERSEN and contact information regarding dose. [address] verbalized understanding.

## 2021-11-10 ENCOUNTER — TELEPHONE (OUTPATIENT)
Dept: PHARMACY | Age: 81
End: 2021-11-10

## 2021-11-10 NOTE — PROGRESS NOTES
Physician Progress Note      PATIENT:               Candy Antunez  CSN #:                  [id number]  :                       1940  ADMIT DATE:       2021 7:37 PM  [address] [place] DATE:        2021 2:13 PM  RESPONDING  PROVIDER #:        Migel Castellon CNP          QUERY TEXT:    Pt admitted with left  intertrochanteric femur fracture s/p fall and has   anemia documented. If possible, please document  further specificity regarding   the acuity and type of anemia:    The medical record reflects the following:  Risk Factors: Normocytic normochromic anemia s/p CMN fixation of left   intertrochanteric femur fx  Clinical Indicators: Per Brief Op Note:  cc Crystalloid 1L. Pre op H/H   8.9/29.2 down to 6.4/21.8 post op  Treatment: 1 unit PRBCs, Labs/monitoring    Thank-you,  Gretel Nunez RN, CDS  [email or web address]  Options provided:  -- Anemia due to acute blood loss, dilutional, and chronic normocytic   normochromic anemia  -- Anemia due to chronic blood loss  -- Anemia due to postoperative blood loss  -- Dilutional anemia  -- Other - I will add my own diagnosis  -- Disagree - Not applicable / Not valid  -- Disagree - Clinically unable to determine / Unknown  -- Refer to Clinical Documentation Reviewer    PROVIDER RESPONSE TEXT:    This patient has acute blood loss anemia with dilutional and chronic   normocytic normochromic anemia.     Query created by: Ree Cruz on 11/10/2021 12:29 PM      Electronically signed by:  Migel Castellon CNP 11/10/2021 12:35 PM

## 2021-11-10 NOTE — TELEPHONE ENCOUNTER
spoke with Tri at [facility] as results for INR were not recorded or sent to us yesterday. Patient was given 2 mg 11/9,  instructed to give 4 mg 11/10 and check INR on 11/11/21.

## 2021-11-11 ENCOUNTER — HOSPITAL ENCOUNTER (OUTPATIENT)
Dept: PHARMACY | Age: 81
Setting detail: THERAPIES SERIES
Discharge: HOME | End: 2021-11-11
Payer: MEDICARE

## 2021-11-11 DIAGNOSIS — I34.0 NONRHEUMATIC MITRAL VALVE REGURGITATION: ICD-10-CM

## 2021-11-11 DIAGNOSIS — I48.91 NEW ONSET ATRIAL FIBRILLATION (HCC): Primary | ICD-10-CM

## 2021-11-11 LAB
INR BLD: 1.7
PROTIME: 19.9 SECONDS

## 2021-11-11 PROCEDURE — 99211 OFF/OP EST MAY X REQ PHY/QHP: CPT

## 2021-11-11 NOTE — PROGRESS NOTES
ANTICOAGULATION SERVICE    Date of Clinic Visit:  11/11/2021    Mikhail Rich is a 78 y.o. female who presents to clinic today for anticoagulation monitoring and adjustment. Recent INR Results:  Internal QC passed  Lab Results   Component Value Date    INR 1.7 11/11/2021    INR 1.0 11/07/2021       Current Warfarin Dosage:  Dosing Plan  As of 11/11/2021    TTR:  54.2 % (2.7 mo)   Full warfarin instructions:  2 mg every Mon, Wed, Fri; 4 mg all other days               Assessment/Plan:    Continue current regimen as INR remains stable. Patient is below target however, she did not receive correct dose in the last few days. Will continue with previous dose regimen and re-check next Tuesday 11/16/21. Called plan to Chuck Conte at [facility] she verbalized understanding. Faxed orders as well. Next Clinic Appointment:  Return date  As of 11/11/2021    TTR:  54.2 % (2.7 mo)   Next INR check:  11/16/2021             Please call [facility] Anticoagulation Clinic at [phone] with any questions. Thanks!   Cora Seymour [facility]  Anticoagulation Service Pharmacist  11/11/2021 4:04 PM  For Pharmacy Admin Tracking Only       Total # of Interventions Recommended: 0   Total # of Interventions Accepted: 0   Time Spent (min): 30

## 2021-11-16 ENCOUNTER — HOSPITAL ENCOUNTER (OUTPATIENT)
Dept: PHARMACY | Age: 81
Setting detail: THERAPIES SERIES
Discharge: HOME | End: 2021-11-16
Payer: MEDICARE

## 2021-11-16 DIAGNOSIS — I34.0 NONRHEUMATIC MITRAL VALVE REGURGITATION: ICD-10-CM

## 2021-11-16 DIAGNOSIS — I48.91 NEW ONSET ATRIAL FIBRILLATION (HCC): Primary | ICD-10-CM

## 2021-11-16 LAB
INR BLD: 3.4
PROTIME: 41.3 SECONDS

## 2021-11-16 PROCEDURE — 99211 OFF/OP EST MAY X REQ PHY/QHP: CPT

## 2021-11-16 NOTE — PROGRESS NOTES
ANTICOAGULATION SERVICE    Date of Clinic Visit:  11/16/2021    Erica Marcano is a 78 y.o. female who is being drawn at [facility]. Recent INR Results:  Internal QC passed  Lab Results   Component Value Date    INR 3.4 11/16/2021    INR 1.7 11/11/2021       Current Warfarin Dosage:  Dosing Plan  As of 11/16/2021    TTR:  54.5 % (2.8 mo)   Full warfarin instructions:  11/17: 2 mg; Otherwise 4 mg every Mon, Wed, Fri; 2 mg all other days               Assessment/Plan:    Modify warfarin dose as noted above: Patient is above target will reduce today's dose and overall dose. RE-check 1 week. Phoned plan to Anette Iraheta she verbalized understanding. Faxed orders as well. Next Clinic Appointment:  Return date  As of 11/16/2021    TTR:  54.5 % (2.8 mo)   Next INR check:  11/23/2021             Please call [facility] Anticoagulation Clinic at [phone] with any questions. Thanks!   KAVYA Gray [facility]  Anticoagulation Service Pharmacist  11/16/2021 9:05 AM  For Pharmacy Admin Tracking Only     Intervention Detail: Dose Adjustment: 1, reason: Therapy De-escalation   Total # of Interventions Recommended: 1   Total # of Interventions Accepted: 1   Time Spent (min): 30

## 2021-11-18 ENCOUNTER — OFFICE VISIT (OUTPATIENT)
Dept: ORTHOPEDIC SURGERY | Age: 81
End: 2021-11-18

## 2021-11-18 VITALS — BODY MASS INDEX: 25.2 KG/M2 | WEIGHT: 125 LBS | HEIGHT: 59 IN

## 2021-11-18 DIAGNOSIS — S72.142A CLOSED DISPLACED INTERTROCHANTERIC FRACTURE OF LEFT FEMUR, INITIAL ENCOUNTER (HCC): Primary | ICD-10-CM

## 2021-11-18 PROCEDURE — 99024 POSTOP FOLLOW-UP VISIT: CPT | Performed by: ORTHOPAEDIC SURGERY

## 2021-11-18 NOTE — PROGRESS NOTES
[facility]  [facility]  [address] [place] [address]  Dept: [phone]  Dept Fax: [phone]        Orthopaedic Trauma Clinic Follow Up      Subjective:   Date of Surgery: 11/5/2021    Kamryn Santos is a 78y.o. year old female who presents to the clinic today for initial 2 week post-op follow up for her left intertrochanteric femur fracture status post cephalomedullary nail fixation of left intertrochanteric femur fracture on 11/5/2021. Patient currently resides at the [address] [place] [address] and reports working with PT but only able to take a few steps with a walker. Reports left hip pain at incision site and c/o left knee pain. She is taking tramadol, tylenol, and tylenol PM PRN as well as scheduled gabapentin. She denies any recent falls or trauma to the LLE. No other complaints at this time. Review of Systems  Gen: no fever, chills, malaise  CV: no chest pain or palpitations  Resp: no cough or shortness of breath  GI: no nausea, vomiting, diarrhea, or constipation  Neuro: no seizures, vertigo, or headache  Msk: left hip and knee pain   10 remaining systems reviewed and negative    Objective : There were no vitals filed for this visit. Body mass index is 25.25 kg/m². General: No acute distress, resting comfortably in the clinic  Neuro: alert. oriented  Eyes: Extra-ocular muscles intact  Pulm: Respirations unlabored and regular. Skin: warm, well perfused  Psych:   Patient has good fund of knowledge and displays understanding of exam, diagnosis, and plan. LLE:  Tenderness to lateral left hip locally over incision sites. Sites healing without evidence of infection- no erythema, drainage or swelling. Left knee minimally tender to palpate just above the patella. Left knee: about 90 degrees active flexion, about 45 degrees active extension. Sensations intact. Radiology:  No new radiographs today. Post-op imaging reviewed with patient.       Assessment:   78 y.o. year old female with a left intertrochanteric femur fracture s/p CMN fixation; DOS: 11/5/2021  Plan:   Glen Acuñatler removed today, OK to wash incisions with soap and water.   -Continue WBAT to BLE, ROM and strength exercises as well as working on balance and fall prevention strategies.   -Continue prn pain medications as prescribed at her facility.   -Letter provided to SNF   -F/u in 4 weeks with x-rays     Follow up: 12/16/2021 appointment made    No orders of the defined types were placed in this encounter. No orders of the defined types were placed in this encounter. Electronically signed by GOYO Moody CNP on 11/18/2021 at 8:47 AM    This note is created with the assistance of a speech recognition program.  While intending to generate a document that actually reflects the content of the visit, the document can still have some errors including those of syntax and sound a like substitutions which may escape proof reading.   In such instances, actual meaning can be extrapolated by contextual diversion

## 2021-11-18 NOTE — LETTER
[facility]  [address] [facility]  Phone: [phone]  Fax: [phone]    Tesha Joseph DO        November 18, 2021     Patient: Luciano Schafer   YOB: 1940   Date of Visit: 11/18/2021       To Whom it May Concern:    Luciano Schafer was seen in my clinic on 11/18/2021. Staples were removed and steri strips were applied which should remain in place until they fall off naturally. The patient is OK to clean incisions with soap and water and keep open to air. Avoid soaks of any kind (bath, hot tub, pool). Continue weight bearing as tolerated to bilateral lower extremities. Continue ROM and strength exercises to left lower extremity. Please focus on balance exercises and fall prevention measures. Recommend continuing PRN tramadol 50mg q6 hours, PRN acetaminophen 650mg q8 hours, PRN tylenol PM extra strength at night, and scheduled gabapentin 100mg TID. Recommend adding PRN low dose Norco if pain remains uncontrolled with above regimen, at your discretion. The patient is scheduled for a follow up in 4 weeks in our clinic on 12/14/21 at 2:00pm    If you have any questions or concerns, please don't hesitate to call.     Sincerely,         Tesha Joseph DO

## 2021-11-23 ENCOUNTER — HOSPITAL ENCOUNTER (OUTPATIENT)
Dept: PHARMACY | Age: 81
Setting detail: THERAPIES SERIES
Discharge: HOME | End: 2021-11-23
Payer: MEDICARE

## 2021-11-23 DIAGNOSIS — I34.0 NONRHEUMATIC MITRAL VALVE REGURGITATION: ICD-10-CM

## 2021-11-23 DIAGNOSIS — I48.91 NEW ONSET ATRIAL FIBRILLATION (HCC): Primary | ICD-10-CM

## 2021-11-23 LAB
INR BLD: 2.6
PROTIME: 31.3 SECONDS

## 2021-11-23 PROCEDURE — 99211 OFF/OP EST MAY X REQ PHY/QHP: CPT

## 2021-11-23 NOTE — PROGRESS NOTES
ANTICOAGULATION SERVICE    Date of Clinic Visit:  2021    Ellis Francisco is a 78 y.o. female who presents to clinic today for anticoagulation monitoring and adjustment. Recent INR Results:  Internal QC passed  Lab Results   Component Value Date    INR 2.6 2021    INR 3.4 2021       Current Warfarin Dosage:  Dosing Plan  As of 2021    TTR:  54.1 % (3.1 mo)   Full warfarin instructions:  4 mg every Mon, Wed, Fri; 2 mg all other days               Assessment/Plan:    Continue current regimen as INR remains stable. INR is back in range today. Continue current dose and recheck one week. Orders called and faxed to [id number] [facility]. Next Clinic Appointment:  Return date  As of 2021    TTR:  54.1 % (3.1 mo)   Next INR check:               Please call [facility] Anticoagulation Clinic at [phone] with any questions. Thanks!   Andrea Trivedi [facility]  Anticoagulation Service Pharmacist  2021 11:31 AM     For Pharmacy Admin Tracking Only     Intervention Detail: Adherence Monitorin   Total # of Interventions Recommended: 0   Total # of Interventions Accepted: 0   Time Spent (min): 15

## 2021-11-30 ENCOUNTER — HOSPITAL ENCOUNTER (OUTPATIENT)
Dept: PHARMACY | Age: 81
Setting detail: THERAPIES SERIES
Discharge: HOME | End: 2021-11-30
Payer: MEDICARE

## 2021-11-30 DIAGNOSIS — I34.0 NONRHEUMATIC MITRAL VALVE REGURGITATION: ICD-10-CM

## 2021-11-30 DIAGNOSIS — I48.91 NEW ONSET ATRIAL FIBRILLATION (HCC): Primary | ICD-10-CM

## 2021-11-30 LAB
INR BLD: 2.3
PROTIME: 28.1 SECONDS

## 2021-11-30 PROCEDURE — 99211 OFF/OP EST MAY X REQ PHY/QHP: CPT

## 2021-11-30 NOTE — PROGRESS NOTES
ANTICOAGULATION SERVICE    Date of Clinic Visit:  11/30/2021    Cristobal Jacobo is a 78 y.o. female who is being drawn at [facility]. Recent INR Results:  Internal QC passed  Lab Results   Component Value Date    INR 2.3 11/30/2021    INR 2.6 11/23/2021       Current Warfarin Dosage:  Dosing Plan  As of 11/30/2021    TTR:  57.1 % (3.3 mo)   Full warfarin instructions:  2 mg every Mon, Wed, Fri; 4 mg all other days               Assessment/Plan:    Continue current regimen as INR remains stable. Called plan to Sha Rascon at [facility] and will fax as well. Next Clinic Appointment:  Return date  As of 11/30/2021    TTR:  57.1 % (3.3 mo)   Next INR check:               Please call [facility] Anticoagulation Clinic at [phone] with any questions. Thanks!   Richard Bean, [facility]  Anticoagulation Service Pharmacist  11/30/2021 10:18 AM  For Pharmacy Admin Tracking Only        Total # of Interventions Recommended: 0   Total # of Interventions Accepted: 0   Time Spent (min): 30

## 2021-12-13 DIAGNOSIS — S72.142A CLOSED DISPLACED INTERTROCHANTERIC FRACTURE OF LEFT FEMUR, INITIAL ENCOUNTER (HCC): Primary | ICD-10-CM

## 2021-12-14 ENCOUNTER — HOSPITAL ENCOUNTER (OUTPATIENT)
Dept: PHARMACY | Age: 81
Setting detail: THERAPIES SERIES
Discharge: HOME | End: 2021-12-14
Payer: MEDICARE

## 2021-12-14 DIAGNOSIS — I34.0 NONRHEUMATIC MITRAL VALVE REGURGITATION: ICD-10-CM

## 2021-12-14 DIAGNOSIS — I48.91 NEW ONSET ATRIAL FIBRILLATION (HCC): Primary | ICD-10-CM

## 2021-12-14 LAB
INR BLD: 4.2
PROTIME: 49.8 SECONDS

## 2021-12-14 PROCEDURE — 99212 OFFICE O/P EST SF 10 MIN: CPT

## 2021-12-14 NOTE — PROGRESS NOTES
ANTICOAGULATION SERVICE    Date of Clinic Visit:  12/14/2021    Anup Mcclain is a 78 y.o. female who presents to clinic today for anticoagulation monitoring and adjustment. Recent INR Results:  Internal QC passed  Lab Results   Component Value Date    INR 4.2 12/14/2021    INR 2.3 11/30/2021       Current Warfarin Dosage:  Dosing Plan  As of 12/14/2021    TTR:  54.6 % (3.8 mo)   Full warfarin instructions:  12/14: Hold; 12/15: 2 mg; Otherwise 4 mg every Mon, Wed, Fri; 2 mg all other days               Assessment/Plan:    Modify warfarin dose as noted above:  Patient above target will hold x 1 day. Reduce and dose and re-check 1 dose. Next Clinic Appointment:  Return date  As of 12/14/2021    TTR:  54.6 % (3.8 mo)   Next INR check:  12/21/2021             Please call [facility] Anticoagulation Clinic at [phone] with any questions. Thanks!   Juan Carlos Jarrett [facility]  Anticoagulation Service Pharmacist  12/14/2021 2:14 PM  For Pharmacy Admin Tracking Only     Intervention Detail: Dose Adjustment: 1, reason: Therapy De-escalation   Total # of Interventions Recommended: 1   Total # of Interventions Accepted: 1   Time Spent (min): 30

## 2021-12-14 NOTE — PATIENT INSTRUCTIONS
\"On day of next appointment, please screen for temperature and COVID-19 symptoms prior to you clinic appointment. If any symptoms present, please call [phone] to reschedule. \"

## 2021-12-16 LAB — INR BLD: 2.8

## 2021-12-20 LAB
INR BLD: 3.3
PROTIME: 39.4 SECONDS

## 2021-12-21 ENCOUNTER — HOSPITAL ENCOUNTER (OUTPATIENT)
Dept: PHARMACY | Age: 81
Setting detail: THERAPIES SERIES
Discharge: HOME | End: 2021-12-21
Payer: MEDICARE

## 2021-12-21 ENCOUNTER — OFFICE VISIT (OUTPATIENT)
Dept: ORTHOPEDIC SURGERY | Age: 81
End: 2021-12-21

## 2021-12-21 VITALS — WEIGHT: 125 LBS | BODY MASS INDEX: 25.2 KG/M2 | HEIGHT: 59 IN

## 2021-12-21 DIAGNOSIS — I48.91 NEW ONSET ATRIAL FIBRILLATION (HCC): Primary | ICD-10-CM

## 2021-12-21 DIAGNOSIS — I34.0 NONRHEUMATIC MITRAL VALVE REGURGITATION: ICD-10-CM

## 2021-12-21 DIAGNOSIS — S72.142A CLOSED DISPLACED INTERTROCHANTERIC FRACTURE OF LEFT FEMUR, INITIAL ENCOUNTER (HCC): Primary | ICD-10-CM

## 2021-12-21 PROCEDURE — 99024 POSTOP FOLLOW-UP VISIT: CPT | Performed by: ORTHOPAEDIC SURGERY

## 2021-12-21 PROCEDURE — 99212 OFFICE O/P EST SF 10 MIN: CPT

## 2021-12-21 NOTE — PROGRESS NOTES
ANTICOAGULATION SERVICE    Date of Clinic Visit:  12/21/2021    Jenny Funes is a 78 y.o. female who presents to clinic today for anticoagulation monitoring and adjustment. Recent INR Results:  Internal QC passed  Lab Results   Component Value Date    INR 3.3 12/20/2021    INR 2.8 12/16/2021       Current Warfarin Dosage:  Dosing Plan  As of 12/21/2021    TTR:  53.3 % (4 mo)   Full warfarin instructions:  12/21: Hold; 12/22: 2 mg; Otherwise 4 mg every Mon, Wed, Fri; 2 mg all other days               Assessment/Plan:    Modify warfarin dose as noted above: INR was elevated yesterday. Hipolito Stanford is on Cipro x 10 days and was started 3 days ago. I will hold dose today then 2 mg x 2 days and recheck in 3 days. Next Clinic Appointment:  Return date  As of 12/21/2021    TTR:  53.3 % (4 mo)   Next INR check:  12/24/2021             Please call [facility] Anticoagulation Clinic at [phone] with any questions. Thanks!   Amor Lopez, [facility]  Anticoagulation Service Pharmacist  12/21/2021 10:52 AM     For Pharmacy Admin Tracking Only     Intervention Detail: Dose Adjustment: 1, reason: Therapy Optimization   Total # of Interventions Recommended: 1   Total # of Interventions Accepted: 1   Time Spent (min): 15

## 2021-12-21 NOTE — PROGRESS NOTES
[facility]  [facility]  [address]  Dept: [phone]  Dept Fax: [phone]        Orthopaedic Trauma Clinic Follow Up      Subjective:   Date of Surgery: 11/5/2021    Pamela Snyder is a 78y.o. year old female who presents to the clinic today for routine follow up 6 weeks post-operatively status post cephamedullary nail fixation for left intertrochanteric femur fracture on 11/5/21. Patient resides at [facility]. She reports constant achy pain in the left hip that is dull in nature and comes and goes regardless of activity. States she is still having medial knee pain with weightbearing activity but is slowly improving. Patient's son is present for this visit and states patient had a fall about two weeks ago at her facility. She was walking to the bathroom unassisted and fell onto her buttocks. Denies any other injuries at that time and no further falls since that incident. Reports working with physical therapy and feels she is progressing with her strength and ADLs. Typically ambulates with a walker and uses a wheelchair for longer distances. No other concerns at this time. Review of Systems  Gen: no fever, chills, malaise  CV: no chest pain or palpitations  Resp: no cough or shortness of breath  GI: no nausea, vomiting, diarrhea, or constipation  Neuro: no seizures, vertigo, or headache  Msk: left hip and knee pain   10 remaining systems reviewed and negative    Objective : There were no vitals filed for this visit. Body mass index is 25.25 kg/m². General: No acute distress, resting comfortably in the clinic  Neuro: alert. oriented  Eyes: Extra-ocular muscles intact  Pulm: Respirations unlabored and regular. Skin: warm, well perfused  Psych:   Patient has good fund of knowledge and displays understanding of exam, diagnosis, and plan. LLE: Incisions well healed. Generalized TTP lateral hip. Mild TTP medial knee joint line, no effusions.  Active ROM

## 2021-12-21 NOTE — LETTER
[facility]  [facility] [address]  Phone: [phone]  Fax: [phone]    Clay Avila DO        December 21, 2021     Patient: Issac Garvey   YOB: 1940   Date of Visit: 12/21/2021       To Whom it May Concern:    Issac Garvey was seen in my clinic on 12/21/2021. Her incisions are well healed. X-rays today show evidence of callus formation over the fracture site and stable orthopedic hardware. She is to continue working with physical therapy to stretch and strengthen the left lower extremity. She should also continue gait training and balance exercises. The patient will follow up in our clinic on                 , an appointment has already been scheduled. If you have any questions or concerns, please don't hesitate to call.     Sincerely,         Clya Avila, DO

## 2021-12-24 ENCOUNTER — HOSPITAL ENCOUNTER (OUTPATIENT)
Dept: PHARMACY | Age: 81
Setting detail: THERAPIES SERIES
Discharge: HOME | End: 2021-12-24
Payer: MEDICARE

## 2021-12-24 DIAGNOSIS — I34.0 NONRHEUMATIC MITRAL VALVE REGURGITATION: ICD-10-CM

## 2021-12-24 DIAGNOSIS — I48.91 NEW ONSET ATRIAL FIBRILLATION (HCC): Primary | ICD-10-CM

## 2021-12-24 LAB
INR BLD: 2.4
PROTIME: 28.9 SECONDS

## 2021-12-24 PROCEDURE — 99212 OFFICE O/P EST SF 10 MIN: CPT

## 2021-12-24 NOTE — PROGRESS NOTES
ANTICOAGULATION SERVICE    Date of Clinic Visit:  12/24/2021    Cristobal Jacobo is a 78 y.o. female who is being drawn by [id number] [facility]. Recent INR Results:  Internal QC passed  Lab Results   Component Value Date    INR 2.4 12/24/2021    INR 3.3 12/20/2021       Current Warfarin Dosage:  Dosing Plan  As of 12/24/2021    TTR:  53.7 % (4.1 mo)   Full warfarin instructions:  12/24: 2 mg; 12/25: 1 mg; 12/27: 2 mg; Otherwise 4 mg every Mon, Wed, Fri; 2 mg all other days               Assessment/Plan:    Modify warfarin dose as noted above: Patient INR in range but still on Cipro thru 12/28/21. So modify dose as above and re-check 12/27/21. Results called to Roberto Garcia 9:35 am.    Next Clinic Appointment:  Return date  As of 12/24/2021    TTR:  53.7 % (4.1 mo)   Next INR check:  12/27/2021             Please call [facility] Anticoagulation Clinic at [phone] with any questions. Thanks!   Richard Bean [facility]  Anticoagulation Service Pharmacist  12/24/2021 9:35 AM  For Pharmacy Admin Tracking Only     Intervention Detail: Dose Adjustment: 1, reason: Therapy Optimization   Total # of Interventions Recommended: 1   Total # of Interventions Accepted: 1   Time Spent (min): 20

## 2021-12-27 ENCOUNTER — HOSPITAL ENCOUNTER (OUTPATIENT)
Dept: PHARMACY | Age: 81
Setting detail: THERAPIES SERIES
Discharge: HOME | End: 2021-12-27
Payer: MEDICARE

## 2021-12-27 DIAGNOSIS — I34.0 NONRHEUMATIC MITRAL VALVE REGURGITATION: ICD-10-CM

## 2021-12-27 DIAGNOSIS — I48.91 NEW ONSET ATRIAL FIBRILLATION (HCC): Primary | ICD-10-CM

## 2021-12-27 LAB
INR BLD: 1.9
PROTIME: 23.3 SECONDS

## 2021-12-27 PROCEDURE — 99211 OFF/OP EST MAY X REQ PHY/QHP: CPT

## 2021-12-27 NOTE — PROGRESS NOTES
ANTICOAGULATION SERVICE    Date of Clinic Visit:  12/27/2021    Fay German is a 78 y.o. female who presents to clinic today for anticoagulation monitoring and adjustment. Recent INR Results:  Internal QC passed  Lab Results   Component Value Date    INR 1.9 12/27/2021    INR 2.4 12/24/2021       Current Warfarin Dosage:  **Insert warfarin dose here**    Assessment/Plan:    Modify warfarin dose as noted above: INR is just below range today. I will increase dose back up to dose prior to starting the Ciprofloxacin and recheck in one week. Next Clinic Appointment:  Return date  As of 12/27/2021    TTR:  54.4 % (4.2 mo)   Next INR check:  1/3/2022             Please call [facility] Anticoagulation Clinic at [phone] with any questions. Thanks!   Capo Esquivel [facility]  Anticoagulation Service Pharmacist  12/27/2021 12:06 PM     For Pharmacy Admin Tracking Only     Intervention Detail: Dose Adjustment: 1, reason: Therapy Optimization   Total # of Interventions Recommended: 1   Total # of Interventions Accepted: 1   Time Spent (min): 15

## 2022-01-03 ENCOUNTER — HOSPITAL ENCOUNTER (OUTPATIENT)
Dept: PHARMACY | Age: 82
Setting detail: THERAPIES SERIES
Discharge: HOME | End: 2022-01-03
Payer: MEDICARE

## 2022-01-03 DIAGNOSIS — I48.91 NEW ONSET ATRIAL FIBRILLATION (HCC): Primary | ICD-10-CM

## 2022-01-03 DIAGNOSIS — I34.0 NONRHEUMATIC MITRAL VALVE REGURGITATION: ICD-10-CM

## 2022-01-03 LAB
INR BLD: 1.7
PROTIME: 21 SECONDS

## 2022-01-03 PROCEDURE — 99212 OFFICE O/P EST SF 10 MIN: CPT

## 2022-01-03 NOTE — PROGRESS NOTES
ANTICOAGULATION SERVICE    Date of Clinic Visit:  1/3/2022    Raegan Washington is a 78 y.o. female who presents to clinic today for anticoagulation monitoring and adjustment. Recent INR Results:  Internal QC passed  Lab Results   Component Value Date    INR 1.7 01/03/2022    INR 1.9 12/27/2021       Current Warfarin Dosage:  Dosing Plan  As of 1/3/2022    TTR:  51.6 % (4.4 mo)   Full warfarin instructions:  4 mg every Mon, Wed, Fri; 2 mg all other days               Assessment/Plan:    Modify warfarin dose as noted above: INR remains low after dose increase one week ago. No missed doses or change in medications per RN. I will increase weekly dose 10% this week and recheck in one week again. Instructions called to Jorgito Isaac [place] [id number] [facility]. Next Clinic Appointment:  Return date  As of 1/3/2022    TTR:  51.6 % (4.4 mo)   Next INR check:  1/10/2022             Please call [facility] Anticoagulation Clinic at [phone] with any questions. Thanks!   Melanie Garcia, [facility]  Anticoagulation Service Pharmacist  1/3/2022 10:08 AM     For Pharmacy Admin Tracking Only     Intervention Detail: Dose Adjustment: 1, reason: Therapy Optimization   Total # of Interventions Recommended: 1   Total # of Interventions Accepted: 1   Time Spent (min): 15

## 2022-01-10 ENCOUNTER — HOSPITAL ENCOUNTER (OUTPATIENT)
Dept: PHARMACY | Age: 82
Setting detail: THERAPIES SERIES
Discharge: HOME | End: 2022-01-10
Payer: MEDICARE

## 2022-01-10 DIAGNOSIS — I48.91 NEW ONSET ATRIAL FIBRILLATION (HCC): Primary | ICD-10-CM

## 2022-01-10 DIAGNOSIS — I34.0 NONRHEUMATIC MITRAL VALVE REGURGITATION: ICD-10-CM

## 2022-01-10 LAB
INR BLD: 2
PROTIME: 23.9 SECONDS

## 2022-01-10 PROCEDURE — 99212 OFFICE O/P EST SF 10 MIN: CPT

## 2022-01-10 NOTE — PROGRESS NOTES
ANTICOAGULATION SERVICE    Date of Clinic Visit:  1/10/2022    Lawrence Garcia is a 78 y.o. female who presents to clinic today for anticoagulation monitoring and adjustment. Recent INR Results:  Internal QC passed  Lab Results   Component Value Date    INR 2 01/10/2022    INR 1.7 01/03/2022       Current Warfarin Dosage:  Dosing Plan  As of 1/10/2022    TTR:  49.2 % (4.7 mo)   Full warfarin instructions:  1/10: 5 mg; Otherwise 4 mg every Mon, Wed, Fri; 2 mg all other days           Assessment/Plan:    Modify warfarin dose as noted above: Patient just made target range will boost today's dose and re-check 1 week. Called plan to [facility] at [address] 10:30am  Next Clinic Appointment:  Return date  As of 1/10/2022    TTR:  49.2 % (4.7 mo)   Next INR check:  1/17/2022             Please call [facility] Anticoagulation Clinic at [phone] with any questions. Thanks!   KAVYA Dave [facility]  Anticoagulation Service Pharmacist  1/10/2022 10:36 AM  For Pharmacy Admin Tracking Only     Intervention Detail: Dose Adjustment: 1, reason: Therapy Optimization   Total # of Interventions Recommended: 1   Total # of Interventions Accepted: 1   Time Spent (min): 30

## 2022-01-14 ENCOUNTER — HOSPITAL ENCOUNTER (OUTPATIENT)
Dept: PHARMACY | Age: 82
Setting detail: THERAPIES SERIES
Discharge: HOME | End: 2022-01-14
Payer: MEDICARE

## 2022-01-14 DIAGNOSIS — I34.0 NONRHEUMATIC MITRAL VALVE REGURGITATION: ICD-10-CM

## 2022-01-14 DIAGNOSIS — I48.91 NEW ONSET ATRIAL FIBRILLATION (HCC): Primary | ICD-10-CM

## 2022-01-14 LAB — INR BLD: 3.2

## 2022-01-14 PROCEDURE — 99211 OFF/OP EST MAY X REQ PHY/QHP: CPT

## 2022-01-14 NOTE — PROGRESS NOTES
ANTICOAGULATION SERVICE    Date of Clinic Visit:  1/14/2022    Geovanna Robin is a 78 y.o. female who presents to clinic today for anticoagulation monitoring and adjustment. Recent INR Results:  Internal QC passed  Lab Results   Component Value Date    INR 3.2 01/14/2022    INR 2 01/10/2022       Current Warfarin Dosage:  Dosing Plan  As of 1/14/2022    TTR:  50.3 % (4.8 mo)   Full warfarin instructions:  1/14: 2 mg; Otherwise 4 mg every Mon, Wed, Fri; 2 mg all other days               Assessment/Plan:    Modify warfarin dose as noted above: INR is slightly above range today but has increased significantly in the past 4 days. Tres Lynn did say Chacho Manning tested positive for covid. I will decrease todays dose and her normal dose on Saturday and Sunday are a lower dose already. Recheck in 3 days. Next Clinic Appointment:  Return date  As of 1/14/2022    TTR:  50.3 % (4.8 mo)   Next INR check:  1/21/2022             Please call [facility] Anticoagulation Clinic at [phone] with any questions. Thanks!   Jogre Baez, [facility]  Anticoagulation Service Pharmacist  1/14/2022 4:09 PM     For Pharmacy Admin Tracking Only     Intervention Detail: Dose Adjustment: 1, reason: Therapy Optimization   Total # of Interventions Recommended: 1   Total # of Interventions Accepted: 1   Time Spent (min): 15

## 2022-01-17 ENCOUNTER — HOSPITAL ENCOUNTER (OUTPATIENT)
Dept: PHARMACY | Age: 82
Setting detail: THERAPIES SERIES
Discharge: HOME | End: 2022-01-17
Payer: MEDICARE

## 2022-01-17 DIAGNOSIS — I48.91 NEW ONSET ATRIAL FIBRILLATION (HCC): Primary | ICD-10-CM

## 2022-01-17 DIAGNOSIS — I34.0 NONRHEUMATIC MITRAL VALVE REGURGITATION: ICD-10-CM

## 2022-01-17 LAB
INR BLD: 5
PROTIME: 59.5 SECONDS

## 2022-01-17 PROCEDURE — 99212 OFFICE O/P EST SF 10 MIN: CPT

## 2022-01-17 NOTE — PROGRESS NOTES
ANTICOAGULATION SERVICE    Date of Clinic Visit:  1/17/2022    Alberto Leslie is a 78 y.o. female who presents to clinic today for anticoagulation monitoring and adjustment. Recent INR Results:  Internal QC passed  Lab Results   Component Value Date    INR 5 01/17/2022    INR 3.2 01/14/2022       Current Warfarin Dosage:  Dosing Plan  As of 1/17/2022    TTR:  49.3 % (4.9 mo)   Full warfarin instructions:  1/17: Hold; 1/18: Hold; 1/19: 1 mg; Otherwise 4 mg every Mon, Wed, Fri; 2 mg all other days               Assessment/Plan:    Modify warfarin dose as noted above: INR is very elevated today after decreasing dose 3 days ago. Likely due to covid. I will hold dose x 2 days and recheck. Instructions called to Israel Hou RN at [facility]. Next Clinic Appointment:  Return date  As of 1/17/2022    TTR:  49.3 % (4.9 mo)   Next INR check:  1/20/2022             Please call [facility] at [phone] with any questions. Thanks!   Sivan Bloom, [address] [facility]  Anticoagulation Service Pharmacist  1/17/2022 2:44 PM     For Pharmacy Admin Tracking Only     Intervention Detail: Dose Adjustment: 1, reason: Therapy Optimization   Total # of Interventions Recommended: 1   Total # of Interventions Accepted: 1   Time Spent (min): 15

## 2022-01-20 ENCOUNTER — HOSPITAL ENCOUNTER (OUTPATIENT)
Dept: PHARMACY | Age: 82
Setting detail: THERAPIES SERIES
Discharge: HOME | End: 2022-01-20
Payer: MEDICARE

## 2022-01-20 DIAGNOSIS — I34.0 NONRHEUMATIC MITRAL VALVE REGURGITATION: ICD-10-CM

## 2022-01-20 DIAGNOSIS — I48.91 NEW ONSET ATRIAL FIBRILLATION (HCC): Primary | ICD-10-CM

## 2022-01-20 LAB
INR BLD: 2.6
PROTIME: 31 SECONDS

## 2022-01-20 PROCEDURE — 99211 OFF/OP EST MAY X REQ PHY/QHP: CPT

## 2022-01-20 NOTE — PROGRESS NOTES
ANTICOAGULATION SERVICE    Date of Clinic Visit:  2022    Felicitas Toth is a 78 y.o. female who presents to clinic today for anticoagulation monitoring and adjustment. Recent INR Results:  Internal QC passed  Lab Results   Component Value Date    INR 2.6 2022    INR 5 2022       Current Warfarin Dosage:  Dosing Plan  As of 2022    TTR:  48.6 % (5 mo)   Full warfarin instructions:  : 1 mg; : 1 mg; Otherwise 4 mg every Mon, Wed, Fri; 2 mg all other days               Assessment/Plan:    Modify warfarin dose as noted above: INR back in range today. Will continue with lower dose due to covid. Recheck 4 days. Next Clinic Appointment:  Return date  As of 2022    TTR:  48.6 % (5 mo)   Next INR check:  2022             Please call [facility] Anticoagulation Clinic at [phone] with any questions. Thanks!   Karol Geller [facility]  Anticoagulation Service Pharmacist  2022 4:46 PM     For Pharmacy Admin Tracking Only     Intervention Detail: Adherence Monitorin   Total # of Interventions Recommended: 0   Total # of Interventions Accepted: 0   Time Spent (min): 15

## 2022-01-24 ENCOUNTER — HOSPITAL ENCOUNTER (OUTPATIENT)
Dept: PHARMACY | Age: 82
Setting detail: THERAPIES SERIES
Discharge: HOME | End: 2022-01-24
Payer: MEDICARE

## 2022-01-24 DIAGNOSIS — I34.0 NONRHEUMATIC MITRAL VALVE REGURGITATION: ICD-10-CM

## 2022-01-24 DIAGNOSIS — I48.91 NEW ONSET ATRIAL FIBRILLATION (HCC): Primary | ICD-10-CM

## 2022-01-24 LAB
INR BLD: 1.3
PROTIME: 16.1 SECONDS

## 2022-01-24 PROCEDURE — 99212 OFFICE O/P EST SF 10 MIN: CPT

## 2022-01-24 NOTE — PROGRESS NOTES
ANTICOAGULATION SERVICE    Date of Clinic Visit:  1/24/2022    Bora Bateman is a 78 y.o. female who is being drawn at [address] [facility]. Recent INR Results:  Internal QC passed  Lab Results   Component Value Date    INR 1.3 01/24/2022    INR 2.6 01/20/2022       Current Warfarin Dosage:  Dosing Plan  As of 1/24/2022    TTR:  48.5 % (5.1 mo)   Full warfarin instructions:  4 mg every Mon, Wed, Fri; 2 mg all other days               Assessment/Plan:    Modify warfarin dose as noted above: Patient is well below target will boost today and go back to normal dosing. RE-check on Friday 1/28/22. Patient is COVID +. Plan called to [facility] [address] [facility]. Next Clinic Appointment:  Return date  As of 1/24/2022    TTR:  48.5 % (5.1 mo)   Next INR check:  1/28/2022             Please call [facility] Anticoagulation Clinic at [phone] with any questions. Thanks!   KAVYA Seth [facility]  Anticoagulation Service Pharmacist  1/24/2022 9:22 AM  For Pharmacy Admin Tracking Only     Intervention Detail: Dose Adjustment: 0, reason: Therapy Optimization   Total # of Interventions Recommended: 1   Total # of Interventions Accepted: 1   Time Spent (min): 30

## 2022-01-28 DIAGNOSIS — S72.142A CLOSED DISPLACED INTERTROCHANTERIC FRACTURE OF LEFT FEMUR, INITIAL ENCOUNTER (HCC): Primary | ICD-10-CM

## 2022-01-31 ENCOUNTER — HOSPITAL ENCOUNTER (OUTPATIENT)
Dept: PHARMACY | Age: 82
Setting detail: THERAPIES SERIES
Discharge: HOME | End: 2022-01-31
Payer: MEDICARE

## 2022-01-31 DIAGNOSIS — I48.91 NEW ONSET ATRIAL FIBRILLATION (HCC): Primary | ICD-10-CM

## 2022-01-31 DIAGNOSIS — I34.0 NONRHEUMATIC MITRAL VALVE REGURGITATION: ICD-10-CM

## 2022-01-31 LAB
INR BLD: 1.2
PROTIME: 14.3 SECONDS

## 2022-01-31 PROCEDURE — 99211 OFF/OP EST MAY X REQ PHY/QHP: CPT

## 2022-01-31 NOTE — PROGRESS NOTES
ANTICOAGULATION SERVICE    Date of Clinic Visit:  1/31/2022    Marko Aguillon is a 78 y.o. female who presents to clinic today for anticoagulation monitoring and adjustment. Recent INR Results:  Internal QC passed  Lab Results   Component Value Date    INR 1.2 01/31/2022    INR 1.3 01/24/2022       Current Warfarin Dosage:  Dosing Plan  As of 1/31/2022    TTR:  46.3 % (5.4 mo)   Full warfarin instructions:  1/31: 6 mg; Otherwise 2 mg every Sun, Tue, Thu; 4 mg all other days               Assessment/Plan:    Modify warfarin dose as noted above: INR is very low today. Dose was missed on Friday 01/28/22. I will give extra 2 mg today and increase weekly dose by 10%. Recheck one week. Next Clinic Appointment:  Return date  As of 1/31/2022    TTR:  46.3 % (5.4 mo)   Next INR check:  2/7/2022             Please call [facility] at [phone] with any questions. Thanks!   Beth Mishra [facility]  Anticoagulation Service Pharmacist  1/31/2022 1:12 PM     For Pharmacy Admin Tracking Only     Intervention Detail: Dose Adjustment: 1, reason: Therapy Optimization   Total # of Interventions Recommended: 1   Total # of Interventions Accepted: 1   Time Spent (min): 15

## 2022-02-01 ENCOUNTER — OFFICE VISIT (OUTPATIENT)
Dept: ORTHOPEDIC SURGERY | Age: 82
End: 2022-02-01

## 2022-02-01 DIAGNOSIS — S72.142A CLOSED DISPLACED INTERTROCHANTERIC FRACTURE OF LEFT FEMUR, INITIAL ENCOUNTER (HCC): Primary | ICD-10-CM

## 2022-02-01 PROCEDURE — G8417 CALC BMI ABV UP PARAM F/U: HCPCS | Performed by: STUDENT IN AN ORGANIZED HEALTH CARE EDUCATION/TRAINING PROGRAM

## 2022-02-01 PROCEDURE — G8400 PT W/DXA NO RESULTS DOC: HCPCS | Performed by: STUDENT IN AN ORGANIZED HEALTH CARE EDUCATION/TRAINING PROGRAM

## 2022-02-01 PROCEDURE — 1036F TOBACCO NON-USER: CPT | Performed by: STUDENT IN AN ORGANIZED HEALTH CARE EDUCATION/TRAINING PROGRAM

## 2022-02-01 PROCEDURE — 1123F ACP DISCUSS/DSCN MKR DOCD: CPT | Performed by: STUDENT IN AN ORGANIZED HEALTH CARE EDUCATION/TRAINING PROGRAM

## 2022-02-01 PROCEDURE — G8427 DOCREV CUR MEDS BY ELIG CLIN: HCPCS | Performed by: STUDENT IN AN ORGANIZED HEALTH CARE EDUCATION/TRAINING PROGRAM

## 2022-02-01 PROCEDURE — G8484 FLU IMMUNIZE NO ADMIN: HCPCS | Performed by: STUDENT IN AN ORGANIZED HEALTH CARE EDUCATION/TRAINING PROGRAM

## 2022-02-01 PROCEDURE — 4040F PNEUMOC VAC/ADMIN/RCVD: CPT | Performed by: STUDENT IN AN ORGANIZED HEALTH CARE EDUCATION/TRAINING PROGRAM

## 2022-02-01 PROCEDURE — 1090F PRES/ABSN URINE INCON ASSESS: CPT | Performed by: STUDENT IN AN ORGANIZED HEALTH CARE EDUCATION/TRAINING PROGRAM

## 2022-02-01 PROCEDURE — 99024 POSTOP FOLLOW-UP VISIT: CPT | Performed by: STUDENT IN AN ORGANIZED HEALTH CARE EDUCATION/TRAINING PROGRAM

## 2022-02-01 NOTE — Clinical Note
[facility]  [address] [facility]  Phone: [phone]  Fax: [phone]    Bette Sykes DO        February 1, 2022     Patient: Kevin Sorto   YOB: 1940   Date of Visit: 2/1/2022       To Whom It May Concern: It is my medical opinion that Kevin Sorto {Work release (duty restriction):24188}. If you have any questions or concerns, please don't hesitate to call.     Sincerely,        Bette Sykes DO

## 2022-02-01 NOTE — PROGRESS NOTES
[facility]  [facility]  [address] [address] [facility] [place]  Dept: [phone]  Dept Fax: [phone]        Orthopaedic Trauma Clinic Follow Up      Subjective:   Date of Surgery: 11/5/2021    Karen Quijano is a 78y.o. year old female who presents to the clinic today for routine follow up 12 weeks post-operatively status post cephamedullary nail fixation for left intertrochanteric femur fracture on 11/5/21. Patient resides at [facility]. She continues to endorse knee pain that has not gotten any better. Otherwise, her ambulation has improved and she has no issues using her walker. Denies any new falls or injuries. Feels she is progressing well at SNF. Review of Systems  Gen: no fever, chills, malaise  CV: no chest pain or palpitations  Resp: no cough or shortness of breath  GI: no nausea, vomiting, diarrhea, or constipation  Neuro: no seizures, vertigo, or headache  Msk: left hip and knee pain   10 remaining systems reviewed and negative    Objective : There were no vitals filed for this visit. There is no height or weight on file to calculate BMI. General: No acute distress, resting comfortably in the clinic  Neuro: alert. oriented  Eyes: Extra-ocular muscles intact  Pulm: Respirations unlabored and regular. Skin: warm, well perfused  Psych:   Patient has good fund of knowledge and displays understanding of exam, diagnosis, and plan. LLE: Incisions well healed. Minimal TTP lateral hip. Mild TTP medial knee joint line, no effusions. No pain with knee ROM. Active ROM knee 0-120 degrees. Pain with Marcos's but no click/clunk. Compartments soft. 2+ DP pulse. TA/EHL/FHL/GS motor intact. Deep and Superficial Peroneal/Saphenous/Sural SILT.     Radiology:  History: Left intertrochanteric femur fracture s/p CMN fixation    Comparison: 12/21/2021    Findings: 2 views of the left femur (AP, Lateral) in a skeletally mature patient showing a left intertrochanteric femur fracture with open reduction internal fixation with intramedullary nail. There is increased evidence of callus formation near the fracture site. There is no evidence of hardware complication. The fracture alignment is near anatomic. Impression: Good interval healing of left intertrochanteric femur fracture s/p CMN fixation. Assessment:   78y.o. year old female with a left intertrochanteric femur fracture s/p CMN fixation; DOS: 11/5/2021. Plan:   -Overall patient is doing well today. She is progressing with therapy and increasing her activity.   -Note provided to facility with an update on today's visit.   -Intra-articular steroid provided to left knee; educated that we can do this every 3-4 months  -Plan to return in 3 months with repeat images to assess fracture healing and functional status    Injection procedure note  The alternatives, benefits, and risks were discussed with the patient. After answering all questions to the patient's satisfaction, the patient agreed to proceed forward with injection and gave verbal consent for the procedure. With the patient's permission, appropriate anatomic landmarks were identified and the left knee joint was prepped in a sterile fashion using alcohol and/or betadine. A 21 gauge needle was then used to inject 2cc 0.25% marcaine plain and 2cc of 0.5% lidocaine plain and 80mg depo medrol into the joint. The injection was advanced without resistance confirming appropriate position. The patient tolerated the procedure well and the site was dressed with a band-aid. Patient was advised to ice the area for 15-20 minutes to relieve any injection site related pain. Patient was advised to contact nurse if area becomes swollen, hot, erythematous, or painful, or to go to the emergency room after business hours. Follow up:Return in about 3 months (around 5/1/2022). No orders of the defined types were placed in this encounter.          No orders of the defined types were placed in this encounter. Electronically signed by Ashley Howell MD on 2/2/2022 at 5:24 AM    This note is created with the assistance of a speech recognition program.  While intending to generate a document that actually reflects the content of the visit, the document can still have some errors including those of syntax and sound a like substitutions which may escape proof reading.   In such instances, actual meaning can be extrapolated by contextual diversion

## 2022-02-01 NOTE — LETTER
[facility]  [address] [facility]  Phone: [phone]  Fax: [phone]    Sue Viera DO    February 1, 2022     Patient: Sergio Dale   MR Number: [id number]   YOB: 1940   Date of Visit: 2/1/2022       To whom it may concern:    Sergio Dale is doing well post-operatively from left femur fracture treated with cephallomedullary nail fixation. She has no restrictions from our perspective. Future treatment plan is summarized below:    -Overall patient is doing well today. She is progressing with therapy and increasing her activity.   -Intra-articular steroid provided to left knee; educated that we can do this every 3-4 months  -Plan to return in 3 months with repeat images to assess fracture healing and functional status      If you have questions, please do not hesitate to call me. I look forward to following Florencio Plusronnie along with you.     Sincerely,      Sue Viera DO

## 2022-02-01 NOTE — LETTER
[facility]  [address] [facility] [facility] [address] [facility]  Phone: [phone]  Fax: [phone]    Eddie Candelario DO    February 1, 2022     Patient: Patricia Dowd   MR Number: [id number]   YOB: 1940   Date of Visit: 2/1/2022       To whom it may concern:    Patricia Dowd is doing well post-operatively from left femur fracture treated with cephallomedullary nail fixation. She has no restrictions from our perspective. Future treatment plan is summarized below:    -Overall patient is doing well today. She is progressing with therapy and increasing her activity.   -Intra-articular steroid provided to left knee; educated that we can do this every 3-4 months  -Plan to return in 3 months with repeat images to assess fracture healing and functional status      If you have questions, please do not hesitate to call me. I look forward to following Maxwell Cai along with you.     Sincerely,      Eddie Candelario DO

## 2022-02-01 NOTE — LETTER
[facility]  [address] [facility] [address] [facility]  Phone: [phone]  Fax: [phone]    Santos Brown DO    February 1, 2022     Patient: Gary Lobo   MR Number: [id number]   YOB: 1940   Date of Visit: 2/1/2022       To whom it may concern:    Gary Lobo is doing well post-operatively from left femur fracture treated with cephallomedullary nail fixation. She has no restrictions from our perspective. Future treatment plan is summarized below:    -Overall patient is doing well today. She is progressing with therapy and increasing her activity.   -Intra-articular steroid provided to left knee; educated that we can do this every 3-4 months  -Plan to return in 3 months with repeat images to assess fracture healing and functional status      If you have questions, please do not hesitate to call me. I look forward to following Mady Aburto along with you.     Sincerely,      Santos Brown DO

## 2022-02-07 ENCOUNTER — HOSPITAL ENCOUNTER (OUTPATIENT)
Dept: PHARMACY | Age: 82
Setting detail: THERAPIES SERIES
Discharge: HOME | End: 2022-02-07
Payer: MEDICARE

## 2022-02-07 DIAGNOSIS — I48.91 NEW ONSET ATRIAL FIBRILLATION (HCC): Primary | ICD-10-CM

## 2022-02-07 DIAGNOSIS — I34.0 NONRHEUMATIC MITRAL VALVE REGURGITATION: ICD-10-CM

## 2022-02-07 LAB — INR BLD: 2.2

## 2022-02-07 PROCEDURE — 99211 OFF/OP EST MAY X REQ PHY/QHP: CPT

## 2022-02-07 NOTE — PROGRESS NOTES
ANTICOAGULATION SERVICE    Date of Clinic Visit:  2/7/2022    Patricia Dowd is a 78 y.o. female who presents to clinic today for anticoagulation monitoring and adjustment. Recent INR Results:  Internal QC passed  Lab Results   Component Value Date    INR 2.2 02/07/2022    INR 1.2 01/31/2022       Current Warfarin Dosage:  Dosing Plan  As of 2/7/2022    TTR:  45.2 % (5.6 mo)   Full warfarin instructions:  2 mg every Sun, Tue, Thu; 4 mg all other days               Assessment/Plan:    Continue current regimen as INR remains stable. Called [facility] to inquire about INR results from today. Odalys GODINEZ said was 2.2. I will continue with same dose at last week and recheck in one week again. Instructions given to Noa Watson with readback and also faxed. Next Clinic Appointment:  Return date  As of 2/7/2022    TTR:  45.2 % (5.6 mo)   Next INR check:  2/14/2022             Please call [facility] Anticoagulation Clinic at [phone] with any questions. Thanks!   Sigrid Dwyer [facility]  Anticoagulation Service Pharmacist  2/7/2022 4:14 PM

## 2022-02-14 ENCOUNTER — HOSPITAL ENCOUNTER (OUTPATIENT)
Dept: PHARMACY | Age: 82
Setting detail: THERAPIES SERIES
Discharge: HOME | End: 2022-02-14
Payer: MEDICARE

## 2022-02-14 DIAGNOSIS — I48.91 NEW ONSET ATRIAL FIBRILLATION (HCC): Primary | ICD-10-CM

## 2022-02-14 DIAGNOSIS — I34.0 NONRHEUMATIC MITRAL VALVE REGURGITATION: ICD-10-CM

## 2022-02-14 LAB — INR BLD: 2.2

## 2022-02-14 PROCEDURE — 99211 OFF/OP EST MAY X REQ PHY/QHP: CPT

## 2022-02-14 NOTE — PROGRESS NOTES
ANTICOAGULATION SERVICE    Date of Clinic Visit:  2/14/2022    Marjan Greenberg is a 78 y.o. female who is being drawn at [facility] [address] [facility]. Recent INR Results:  Internal QC passed  Lab Results   Component Value Date    INR 2.2 02/14/2022    INR 2.2 02/07/2022       Current Warfarin Dosage:  Dosing Plan  As of 2/14/2022    TTR:  47.1 % (5.8 mo)   Full warfarin instructions:  2 mg every Sun, Tue, Thu; 4 mg all other days               Assessment/Plan:    Continue current regimen as INR remains stable. Plan called to [facility] at [address] with read back verification. Next Clinic Appointment:  Return date  As of 2/14/2022    TTR:  47.1 % (5.8 mo)   Next INR check:  2/28/2022             Please call [facility] Anticoagulation Clinic at [phone] with any questions. Thanks!   Alessio José [facility]  Anticoagulation Service Pharmacist  2/14/2022 1:05 PM  For Pharmacy Admin Tracking Only        Total # of Interventions Recommended: 0   Total # of Interventions Accepted: 0   Time Spent (min): 30

## 2022-02-21 ENCOUNTER — OFFICE VISIT (OUTPATIENT)
Dept: CARDIOLOGY | Age: 82
End: 2022-02-21
Payer: MEDICARE

## 2022-02-21 VITALS
HEART RATE: 75 BPM | BODY MASS INDEX: 22.09 KG/M2 | DIASTOLIC BLOOD PRESSURE: 57 MMHG | WEIGHT: 109.6 LBS | SYSTOLIC BLOOD PRESSURE: 127 MMHG | HEIGHT: 59 IN

## 2022-02-21 DIAGNOSIS — I48.91 NEW ONSET ATRIAL FIBRILLATION (HCC): Primary | ICD-10-CM

## 2022-02-21 DIAGNOSIS — I42.2 HYPERTROPHIC CARDIOMYOPATHY (HCC): ICD-10-CM

## 2022-02-21 DIAGNOSIS — I34.0 NONRHEUMATIC MITRAL VALVE REGURGITATION: ICD-10-CM

## 2022-02-21 DIAGNOSIS — I63.9 CEREBROVASCULAR ACCIDENT (CVA), UNSPECIFIED MECHANISM (HCC): ICD-10-CM

## 2022-02-21 DIAGNOSIS — I10 ESSENTIAL HYPERTENSION: ICD-10-CM

## 2022-02-21 PROCEDURE — 99214 OFFICE O/P EST MOD 30 MIN: CPT | Performed by: INTERNAL MEDICINE

## 2022-02-21 PROCEDURE — 1123F ACP DISCUSS/DSCN MKR DOCD: CPT | Performed by: INTERNAL MEDICINE

## 2022-02-21 PROCEDURE — G8420 CALC BMI NORM PARAMETERS: HCPCS | Performed by: INTERNAL MEDICINE

## 2022-02-21 PROCEDURE — 4040F PNEUMOC VAC/ADMIN/RCVD: CPT | Performed by: INTERNAL MEDICINE

## 2022-02-21 PROCEDURE — G8400 PT W/DXA NO RESULTS DOC: HCPCS | Performed by: INTERNAL MEDICINE

## 2022-02-21 PROCEDURE — 1090F PRES/ABSN URINE INCON ASSESS: CPT | Performed by: INTERNAL MEDICINE

## 2022-02-21 PROCEDURE — G8484 FLU IMMUNIZE NO ADMIN: HCPCS | Performed by: INTERNAL MEDICINE

## 2022-02-21 PROCEDURE — 93010 ELECTROCARDIOGRAM REPORT: CPT | Performed by: INTERNAL MEDICINE

## 2022-02-21 PROCEDURE — G8427 DOCREV CUR MEDS BY ELIG CLIN: HCPCS | Performed by: INTERNAL MEDICINE

## 2022-02-21 PROCEDURE — 93005 ELECTROCARDIOGRAM TRACING: CPT | Performed by: INTERNAL MEDICINE

## 2022-02-21 PROCEDURE — 1036F TOBACCO NON-USER: CPT | Performed by: INTERNAL MEDICINE

## 2022-02-21 RX ORDER — DOCUSATE SODIUM 100 MG/1
100 CAPSULE, LIQUID FILLED ORAL 2 TIMES DAILY
COMMUNITY

## 2022-02-21 NOTE — PROGRESS NOTES
[facility]  1940  [id number]    Today: 2/21/22    CC: Patient is here for follow up for Afib, MR, HOCM.     HPI:   Bolivar Gallon follow-up   Seen by us while hospital on 11/21 after mechanical fall that resulted in Femur fracture. We provided preop risk stratification at intermediate risk. She underwent surgery. Her fall was purely mechanical.   Since then she is now living in a NH. Using a wheelchair, and assistance to move around. Denies any cp, sob, no dizziness/lightheadedness. Past Medical:  Past Medical History:   Diagnosis Date    Chronic back pain     Neuropathy     Stroke ([address]     x2 in 2009 and x1 in 2010         Past Surgical:  Past Surgical History:   Procedure Laterality Date    ABDOMEN SURGERY  1995    bowel resection for diverticulosis    APPENDECTOMY      FEMUR SURGERY Left 11/05/2021    nailing    FIXATION KYPHOPLASTY  7/3/14    T 12    HYSTERECTOMY      SPINE SURGERY N/A 5/10/2021    L2 ET L4 KYPHOPLASTY performed by Oumar Meza MD at [facility] [address] Left 11/5/2021    LEFT FEMUR  INTRAMEDULLARY NAILING, ORTIZ AND NEPHEW , C-ARM performed by Colleen Theodore DO at [facility] [address]         Family History:  No family history on file. Social History:  Social History     Socioeconomic History    Marital status:       Spouse name: None    Number of children: None    Years of education: None    Highest education level: None   Occupational History    None   Tobacco Use    Smoking status: Never Smoker    Smokeless tobacco: Never Used   Substance and Sexual Activity    Alcohol use: No    Drug use: No    Sexual activity: None   Other Topics Concern    None   Social History Narrative    None     Social Determinants of Health     Financial Resource Strain:     Difficulty of Paying Living Expenses: Not on file   Food Insecurity:     Worried About Running Out of Food in the Last Year: temperature intolerance. No excessive thirst, fluid intake, or urination. No tremor. · Hematologic/Lymphatic: No abnormal bruising or bleeding, blood clots or swollen lymph nodes. · Allergic/Immunologic: No nasal congestion or hives. Medications:  Outpatient Medications Marked as Taking for the 2/21/22 encounter (Office Visit) with Eva Salas, DO   Medication Sig Dispense Refill    docusate sodium (COLACE) 100 MG capsule Take 100 mg by mouth 2 times daily      furosemide (LASIX) 20 MG tablet Take 1 tablet by mouth daily as needed (swelling, weight gain, sob) (Patient taking differently: Take 20 mg by mouth daily ) 90 tablet 1    amiodarone (CORDARONE) 200 MG tablet Take 1 tablet by mouth daily 60 tablet 1    metoprolol tartrate (LOPRESSOR) 25 MG tablet Take 0.5 tablets by mouth 2 times daily 60 tablet 3    warfarin (COUMADIN) 2 MG tablet Take 1 tablet by mouth daily Follow up with INR in 3 days 30 tablet 3    alendronate (FOSAMAX) 70 MG tablet Take 70 mg by mouth once a week      diphenhydrAMINE-APAP, sleep, (TYLENOL PM EXTRA STRENGTH)  MG tablet Take 1 tablet by mouth nightly as needed for Sleep      gabapentin (NEURONTIN) 100 MG capsule Take 2 capsules by mouth 3 times daily.  sodium chloride () 5 % ophthalmic solution Apply 1 drop to eye 3 times daily      acetaminophen (TYLENOL 8 HOUR ARTHRITIS PAIN) 650 MG extended release tablet Take 650 mg by mouth every 8 hours as needed for Pain      citalopram (CELEXA) 20 MG tablet Take 20 mg by mouth daily.  b complex vitamins capsule Take 1 capsule by mouth daily.  Pyridoxine HCl (VITAMIN B-6) 50 MG tablet Take 50 mg by mouth daily.  vitamin D3 (CHOLECALCIFEROL) 1000 UNITS TABS tablet Take 1,000 Units by mouth 2 times daily.       Alum Hydroxide-Mag Trisilicate (GAVISCON) 03-38.5 MG CHEW Take by mouth as needed           Physical Exam:   Vitals: BP (!) 127/57   Pulse 75   Ht 4' 11\" (1.499 m)   Wt 109 lb 9.6 oz (49.7 kg) is 40 mmHg. Mild pulmonary hypertension. IVC Increased diameter, but still has inspiratory variation suggesting upper normal or mildly elevated RA filling pressure (i.e. CVP) . DARRIN on 8/21:  1. A DARRIN was performed without complications. 2. LVEF 75-80 %  3. No thrombus or valvular vegetation identified  4. Mitral Valve: Structurally normal. 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 Moderate to severe regurgitation  5. Aortic Valve: Increased velocity of LVOT and AV flow due to hyperdynamic state of the left  Ventricle. Echo on 11/21:  Left ventricle is normal in size. Global left ventricular systolic function  is hyperdynamic. Calculated ejection fraction 66 % by Heart Model. Increased velocities of LVOT and AV flow due to hyperdynamic state of LV. Moderate concentric left ventricular hypertrophy. Aortic valve is sclerotic but opens well, mean gradient 21 mmHg. Mild to moderate mitral regurgitation. Moderate tricuspid regurgitation. Moderate pulmonary hypertension. Estimated right ventricular systolic pressure is 36MFCQ. Past Medical and Surgical History, Problem List, Allergies, Medications, Labs, Imaging, all reviewed extensively in EMR and with the patient. Assessment:  - Parox AF- in NSR  - HOCM, Mod/Severe MR- on DARRIN- Mild to mod MR on Echo 11/21  - Hyperdynamic LVEF  - HTN  - H/o CVA in past  - Aortic Stenosis- Mean Gradient 21  - Fall on 11/21- mechanical with hip fracture s/p surgery- now in NH    Plan:  - keep off cardizem due to bradycardia  - continue BB, Amio  - continue coumadin  - continue lasix to use only as needed for swelling/weight gain/SOB  - agree with NH placement to avoid falls . The patient is to continue heart healthy diet, weight loss and exercise as tolerated. Patient's medications and side effects were discussed. Medication refills were provided if needed. Follow up appointment timing was discussed.  All questions and concerns were addressed to patient's satisfaction. The patient is to follow up in 6 months or sooner if necessary. Thank you for allowing me to participate in the care of this patient, please do not hesitate to call if you have any questions. Naomi Arevalo DO, [facility], [address], [address], [place] [address] [facility]  [facility]  [phone]

## 2022-02-22 ENCOUNTER — TELEPHONE (OUTPATIENT)
Dept: CARDIOLOGY | Age: 82
End: 2022-02-22

## 2022-02-22 NOTE — TELEPHONE ENCOUNTER
Call received from [facility]. Remi Guerrero stated she had an order from an echo that was to be held until after office visit on 2/21/2022. Patient had echo on 11/4/2022. Notes do not say echo in 6 months. Do you want her to repeat? Or ok to cancel new echo order?

## 2022-02-22 NOTE — TELEPHONE ENCOUNTER
Cancel the repeat echo (I think was previously ordered, but she went to hospital after fall and had echo there, so can cancel any other echos for now)

## 2022-02-28 ENCOUNTER — HOSPITAL ENCOUNTER (OUTPATIENT)
Dept: PHARMACY | Age: 82
Setting detail: THERAPIES SERIES
Discharge: HOME | End: 2022-02-28
Payer: MEDICARE

## 2022-02-28 DIAGNOSIS — I48.91 NEW ONSET ATRIAL FIBRILLATION (HCC): Primary | ICD-10-CM

## 2022-02-28 DIAGNOSIS — I34.0 NONRHEUMATIC MITRAL VALVE REGURGITATION: ICD-10-CM

## 2022-02-28 LAB — INR BLD: 3.5

## 2022-02-28 PROCEDURE — 99212 OFFICE O/P EST SF 10 MIN: CPT

## 2022-02-28 NOTE — PROGRESS NOTES
ANTICOAGULATION SERVICE    Date of Clinic Visit:  2/28/2022    Lawrence Garcia is a 78 y.o. female who is drawn at [facility]     Recent INR Results:  Internal QC passed  Lab Results   Component Value Date    INR 3.5 02/28/2022    INR 2.2 02/14/2022       Current Warfarin Dosage:  Dosing Plan  As of 2/28/2022    TTR:  48.2 % (6.3 mo)   Full warfarin instructions:  2/28: 2 mg; Otherwise 4 mg every Mon, Wed, Fri; 2 mg all other days             Assessment/Plan:    Modify warfarin dose as noted above: Patient is above target will reduce dose down and re-check 1 week. Orders called to [facility] with repeat back verification and verbal understanding. Next Clinic Appointment:  Return date  As of 2/28/2022    TTR:  48.2 % (6.3 mo)   Next INR check:  3/7/2022             Please call [facility] Anticoagulation Clinic at [phone] with any questions. Thanks!   KAVYA Dave [facility]  Anticoagulation Service Pharmacist  2/28/2022 2:58 PM  For Pharmacy Admin Tracking Only     Intervention Detail: Dose Adjustment: 1, reason: Therapy De-escalation   Total # of Interventions Recommended: 1   Total # of Interventions Accepted: 1   Time Spent (min): 30

## 2022-03-07 ENCOUNTER — HOSPITAL ENCOUNTER (OUTPATIENT)
Dept: PHARMACY | Age: 82
Setting detail: THERAPIES SERIES
Discharge: HOME | End: 2022-03-07
Payer: MEDICARE

## 2022-03-07 DIAGNOSIS — I34.0 NONRHEUMATIC MITRAL VALVE REGURGITATION: ICD-10-CM

## 2022-03-07 DIAGNOSIS — I48.91 NEW ONSET ATRIAL FIBRILLATION (HCC): Primary | ICD-10-CM

## 2022-03-07 LAB — INR BLD: 2.4

## 2022-03-07 PROCEDURE — 99211 OFF/OP EST MAY X REQ PHY/QHP: CPT

## 2022-03-14 ENCOUNTER — HOSPITAL ENCOUNTER (OUTPATIENT)
Dept: PHARMACY | Age: 82
Setting detail: THERAPIES SERIES
Discharge: HOME | End: 2022-03-14
Payer: MEDICARE

## 2022-03-14 DIAGNOSIS — I48.91 NEW ONSET ATRIAL FIBRILLATION (HCC): Primary | ICD-10-CM

## 2022-03-14 DIAGNOSIS — I34.0 NONRHEUMATIC MITRAL VALVE REGURGITATION: ICD-10-CM

## 2022-03-14 LAB — INR BLD: 3.9

## 2022-03-14 PROCEDURE — 99211 OFF/OP EST MAY X REQ PHY/QHP: CPT

## 2022-03-14 NOTE — PROGRESS NOTES
ANTICOAGULATION SERVICE    Date of Clinic Visit:  3/14/2022    Danny Azul is a 78 y.o. female who is having INR drawn at [facility]. Recent INR Results:  Internal QC passed  Lab Results   Component Value Date    INR 3.9 03/14/2022    INR 2.4 03/07/2022       Current Warfarin Dosage:  Dosing Plan  As of 3/14/2022    TTR:  48.2 % (6.8 mo)   Full warfarin instructions:  3/14: 1 mg; 3/16: 2 mg; Otherwise 4 mg every Mon, Wed, Fri; 2 mg all other days               Assessment/Plan:    Modify warfarin dose as noted above: Patient is above target. Will reduce dose down and re-check Friday this week. Next Clinic Appointment:  Return date  As of 3/14/2022    TTR:  48.2 % (6.8 mo)   Next INR check:  3/18/2022             Please call [facility] Anticoagulation Clinic at [phone] with any questions. Thanks!   Jaya Rodriguez [facility]  Anticoagulation Service Pharmacist  3/14/2022 3:31 PM  For Pharmacy Admin Tracking Only     Intervention Detail: Dose Adjustment: 1, reason: Therapy De-escalation   Total # of Interventions Recommended: 1   Total # of Interventions Accepted: 1   Time Spent (min): 30

## 2022-03-18 ENCOUNTER — HOSPITAL ENCOUNTER (OUTPATIENT)
Dept: PHARMACY | Age: 82
Setting detail: THERAPIES SERIES
Discharge: HOME | End: 2022-03-18
Payer: MEDICARE

## 2022-03-18 DIAGNOSIS — I34.0 NONRHEUMATIC MITRAL VALVE REGURGITATION: ICD-10-CM

## 2022-03-18 DIAGNOSIS — I48.91 NEW ONSET ATRIAL FIBRILLATION (HCC): Primary | ICD-10-CM

## 2022-03-18 LAB — INR BLD: 3.3

## 2022-03-18 PROCEDURE — 85610 PROTHROMBIN TIME: CPT

## 2022-03-18 PROCEDURE — 36416 COLLJ CAPILLARY BLOOD SPEC: CPT

## 2022-03-18 PROCEDURE — 99212 OFFICE O/P EST SF 10 MIN: CPT

## 2022-03-18 NOTE — PROGRESS NOTES
ANTICOAGULATION SERVICE    Date of Clinic Visit:  3/18/2022    Travon Felipe is a 78 y.o. female who is being drawn at [facility]. Recent INR Results:  Internal QC passed  Lab Results   Component Value Date    INR 3.3 03/18/2022    INR 3.9 03/14/2022       Current Warfarin Dosage:  Dosing Plan  As of 3/18/2022    TTR:  47.3 % (6.9 mo)   Full warfarin instructions:  3/18: 2 mg; Otherwise 4 mg every Tue, Thu; 2 mg all other days               Assessment/Plan:    Modify warfarin dose as noted above: Patient still above target will reduce dose down and re-check 1 week. Faxed orders to [facility]. Next Clinic Appointment:  Return date  As of 3/18/2022    TTR:  47.3 % (6.9 mo)   Next INR check:  3/25/2022             Please call [facility] Anticoagulation Clinic at [phone] with any questions. Thanks!   Magui De Jesus [facility]  Anticoagulation Service Pharmacist  3/18/2022 1:38 PM  For Pharmacy Admin Tracking Only     Intervention Detail: Dose Adjustment: 1, reason: Therapy De-escalation   Total # of Interventions Recommended: 1   Total # of Interventions Accepted: 1   Time Spent (min): 30

## 2022-03-25 LAB — INR BLD: 3.1

## 2022-03-26 LAB — INR BLD: 4.1

## 2022-03-28 ENCOUNTER — HOSPITAL ENCOUNTER (OUTPATIENT)
Dept: PHARMACY | Age: 82
Setting detail: THERAPIES SERIES
Discharge: HOME | End: 2022-03-28
Payer: MEDICARE

## 2022-03-28 DIAGNOSIS — I48.91 NEW ONSET ATRIAL FIBRILLATION (HCC): Primary | ICD-10-CM

## 2022-03-28 DIAGNOSIS — I34.0 NONRHEUMATIC MITRAL VALVE REGURGITATION: ICD-10-CM

## 2022-03-28 LAB — INR BLD: 1.8

## 2022-03-28 PROCEDURE — 99212 OFFICE O/P EST SF 10 MIN: CPT

## 2022-03-28 NOTE — PROGRESS NOTES
ANTICOAGULATION SERVICE    Date of Clinic Visit:  3/28/2022    Kierra Downs is a 78 y.o. female who presents to clinic today for anticoagulation monitoring and adjustment. Recent INR Results:  Internal QC passed  Lab Results   Component Value Date    INR 4.1 03/26/2022    INR 3.1 03/25/2022       Current Warfarin Dosage:  Dosing Plan  As of 3/28/2022    TTR:  45.4 % (7.2 mo)   Full warfarin instructions:  3 mg every Mon, Thu; 2 mg all other days               Assessment/Plan:    Modify warfarin dose as noted above: INR was checked on Friday but not faxed to us until Saturday 03/26/22. NH did not give dose on Friday evening. Decreased weekly dose 10%. Recheck one week. Next Clinic Appointment:  Return date  As of 3/28/2022    TTR:  45.4 % (7.2 mo)   Next INR check:  4/1/2022             Please call [facility] Anticoagulation Clinic at [phone] with any questions. Thanks!   Bogdan Cordova [facility]  Anticoagulation Service Pharmacist  3/28/2022 7:26 AM

## 2022-03-31 ENCOUNTER — HOSPITAL ENCOUNTER (OUTPATIENT)
Dept: PHARMACY | Age: 82
Setting detail: THERAPIES SERIES
Discharge: HOME | End: 2022-03-31
Payer: MEDICARE

## 2022-03-31 DIAGNOSIS — I34.0 NONRHEUMATIC MITRAL VALVE REGURGITATION: ICD-10-CM

## 2022-03-31 DIAGNOSIS — I48.91 NEW ONSET ATRIAL FIBRILLATION (HCC): Primary | ICD-10-CM

## 2022-03-31 LAB — INR BLD: 1.2

## 2022-03-31 PROCEDURE — 99211 OFF/OP EST MAY X REQ PHY/QHP: CPT

## 2022-03-31 NOTE — PROGRESS NOTES
ANTICOAGULATION SERVICE    Date of Clinic Visit:  3/31/2022    Eliud Rowe is a 78 y.o. female who presents to clinic today for anticoagulation monitoring and adjustment. Recent INR Results:  Internal QC passed  Lab Results   Component Value Date    INR 1.2 2022    INR 1.8 2022       Current Warfarin Dosage:  Dosing Plan  As of 3/31/2022    TTR:  44.7 % (7.3 mo)   Full warfarin instructions:  3 mg every Mon, Thu; 2 mg all other days               Assessment/Plan:    Continue current regimen as INR remains stable. INR is very low today. Probably still reflecting missed dose. Walter Wolf was started on Levaquin yesterday. I will not adjust dose at this time especially since started levaquin. Recheck two days. Instructions given to [place] at [facility] and orders faxed. Next Clinic Appointment:  Return date  As of 3/31/2022    TTR:  44.7 % (7.3 mo)   Next INR check:  2022             Please call [facility] Anticoagulation Clinic at [phone] with any questions. Thanks!   KAVYA Vega [facility]  Anticoagulation Service Pharmacist  3/31/2022 8:27 AM     For Pharmacy Admin Tracking Only     Intervention Detail: Adherence Monitorin   Total # of Interventions Recommended: 0   Total # of Interventions Accepted: 0   Time Spent (min): 15

## 2022-04-02 ENCOUNTER — HOSPITAL ENCOUNTER (OUTPATIENT)
Dept: PHARMACY | Age: 82
Setting detail: THERAPIES SERIES
Discharge: HOME | End: 2022-04-02
Payer: MEDICARE

## 2022-04-02 DIAGNOSIS — I48.91 NEW ONSET ATRIAL FIBRILLATION (HCC): Primary | ICD-10-CM

## 2022-04-02 DIAGNOSIS — I34.0 NONRHEUMATIC MITRAL VALVE REGURGITATION: ICD-10-CM

## 2022-04-02 LAB — INR BLD: 1.4

## 2022-04-02 PROCEDURE — 99212 OFFICE O/P EST SF 10 MIN: CPT

## 2022-04-02 NOTE — PROGRESS NOTES
ANTICOAGULATION SERVICE    Date of Clinic Visit:  4/2/2022    Tyshawn Hoyos is a 78 y.o. female who presents to clinic today for anticoagulation monitoring and adjustment. Recent INR Results:  Internal QC passed  Lab Results   Component Value Date    INR 1.4 04/02/2022    INR 1.2 03/31/2022       Current Warfarin Dosage:  Dosing Plan  As of 4/2/2022    TTR:  44.4 % (7.4 mo)   Full warfarin instructions:  4/2: 3 mg; 4/3: 3 mg; Otherwise 3 mg every Mon, Thu; 2 mg all other days               Assessment/Plan:    Modify warfarin dose as noted above: Patient INR is low and continues on Levaquin thru Monday. Will REcheck INR Tuesday 4/5/22. Will Fax orders over to [facility] per request of Daphne Marquis. Next Clinic Appointment:  Return date  As of 4/2/2022    TTR:  44.4 % (7.4 mo)   Next INR check:  4/5/2022             Please call [facility] Anticoagulation Clinic at [phone] with any questions. Thanks!   KAVYA Crum [facility]  Anticoagulation Service Pharmacist  4/2/2022 11:53 AM  For Pharmacy Admin Tracking Only     Intervention Detail: Dose Adjustment: 1, reason: Therapy Optimization   Total # of Interventions Recommended: 1   Total # of Interventions Accepted: 1   Time Spent (min): 30

## 2022-04-05 ENCOUNTER — HOSPITAL ENCOUNTER (OUTPATIENT)
Dept: PHARMACY | Age: 82
Setting detail: THERAPIES SERIES
Discharge: HOME | End: 2022-04-05
Payer: MEDICARE

## 2022-04-05 DIAGNOSIS — I48.91 NEW ONSET ATRIAL FIBRILLATION (HCC): Primary | ICD-10-CM

## 2022-04-05 DIAGNOSIS — I34.0 NONRHEUMATIC MITRAL VALVE REGURGITATION: ICD-10-CM

## 2022-04-05 LAB — INR BLD: 1.4

## 2022-04-05 PROCEDURE — 99212 OFFICE O/P EST SF 10 MIN: CPT

## 2022-04-05 NOTE — PROGRESS NOTES
ANTICOAGULATION SERVICE    Date of Clinic Visit:  4/5/2022    Maged Vargas is a 78 y.o. female who being check by [id number] [facility]. Recent INR Results:  Internal QC passed  Lab Results   Component Value Date    INR 1.4 04/05/2022    INR 1.4 04/02/2022       Current Warfarin Dosage:  Dosing Plan  As of 4/5/2022    TTR:  43.8 % (7.5 mo)   Full warfarin instructions:  4/5: 4 mg; 4/6: 3 mg; 4/7: 4 mg; 4/8: 3 mg; Otherwise 3 mg every Mon, Thu; 2 mg all other days               Assessment/Plan:    Modify warfarin dose as noted above: Patient INR did not move will boost dose a bit for next few days then return dose toward previous home dose. Next Clinic Appointment:  Return date  As of 4/5/2022    TTR:  43.8 % (7.5 mo)   Next INR check:  4/11/2022             Please call [facility] Anticoagulation Clinic at [phone] with any questions. Thanks!   Enma Garcia [facility]  Anticoagulation Service Pharmacist  4/5/2022 3:11 PM  For Pharmacy Admin Tracking Only     Intervention Detail: Dose Adjustment: 1, reason: Therapy Optimization   Total # of Interventions Recommended: 1   Total # of Interventions Accepted: 1   Time Spent (min): 30

## 2022-04-11 ENCOUNTER — HOSPITAL ENCOUNTER (OUTPATIENT)
Dept: PHARMACY | Age: 82
Setting detail: THERAPIES SERIES
Discharge: HOME | End: 2022-04-11
Payer: MEDICARE

## 2022-04-11 DIAGNOSIS — I34.0 NONRHEUMATIC MITRAL VALVE REGURGITATION: ICD-10-CM

## 2022-04-11 DIAGNOSIS — I48.91 NEW ONSET ATRIAL FIBRILLATION (HCC): Primary | ICD-10-CM

## 2022-04-11 LAB — INR BLD: 1.8

## 2022-04-11 PROCEDURE — 99211 OFF/OP EST MAY X REQ PHY/QHP: CPT

## 2022-04-11 NOTE — PROGRESS NOTES
ANTICOAGULATION SERVICE    Date of Clinic Visit:  2022    Crystal Pal is a 78 y.o. female who presents to clinic today for anticoagulation monitoring and adjustment. Recent INR Results:  Internal QC passed  Lab Results   Component Value Date    INR 1.8 2022    INR 1.4 2022       Current Warfarin Dosage:  Dosing Plan  As of 2022    TTR:  42.6 % (7.7 mo)   Full warfarin instructions:  3 mg every day               Assessment/Plan:    Continue current regimen as INR remains stable. INR remains below range today but has increased from last week. I will continue same dose at last week and anticipate INR will continue to increase into range. Instructions called to [id number] [facility] and faxed. Next Clinic Appointment:  Return date  As of 2022    TTR:  42.6 % (7.7 mo)   Next INR check:  2022             Please call [facility] Anticoagulation Clinic at [phone] with any questions. Thanks!   Guanakito Pickard [facility]  Anticoagulation Service Pharmacist  2022 9:00 AM     For Pharmacy Admin Tracking Only     Intervention Detail: Adherence Monitorin   Total # of Interventions Recommended: 0   Total # of Interventions Accepted: 0   Time Spent (min): 15

## 2022-04-18 ENCOUNTER — HOSPITAL ENCOUNTER (OUTPATIENT)
Dept: PHARMACY | Age: 82
Setting detail: THERAPIES SERIES
Discharge: HOME | End: 2022-04-18
Payer: MEDICARE

## 2022-04-18 DIAGNOSIS — I34.0 NONRHEUMATIC MITRAL VALVE REGURGITATION: ICD-10-CM

## 2022-04-18 DIAGNOSIS — I48.91 NEW ONSET ATRIAL FIBRILLATION (HCC): Primary | ICD-10-CM

## 2022-04-18 LAB — INR BLD: 1.9

## 2022-04-18 PROCEDURE — 99211 OFF/OP EST MAY X REQ PHY/QHP: CPT

## 2022-04-18 NOTE — PROGRESS NOTES
ANTICOAGULATION SERVICE    Date of Clinic Visit:  4/18/2022    Stephania Jimenez is a 78 y.o. female who presents to clinic today for anticoagulation monitoring and adjustment. Recent INR Results:  Internal QC passed  Lab Results   Component Value Date    INR 1.9 04/18/2022    INR 1.8 04/11/2022       Current Warfarin Dosage:  Dosing Plan  As of 4/18/2022    TTR:  41.3 % (8 mo)   Full warfarin instructions:  3 mg every day               Assessment/Plan:    Continue current regimen as INR remains stable. INR is just below range today. I will not change dose at this time but recheck in one week. Next Clinic Appointment:  Return date  As of 4/18/2022    TTR:  41.3 % (8 mo)   Next INR check:  4/25/2022             Please call [facility] Anticoagulation Clinic at [phone] with any questions. Thanks!   Ion Kay, [facility]  Anticoagulation Service Pharmacist  4/18/2022 4:03 PM

## 2022-04-25 ENCOUNTER — HOSPITAL ENCOUNTER (OUTPATIENT)
Dept: PHARMACY | Age: 82
Setting detail: THERAPIES SERIES
Discharge: HOME | End: 2022-04-25
Payer: MEDICARE

## 2022-04-25 DIAGNOSIS — I34.0 NONRHEUMATIC MITRAL VALVE REGURGITATION: ICD-10-CM

## 2022-04-25 DIAGNOSIS — I48.91 NEW ONSET ATRIAL FIBRILLATION (HCC): Primary | ICD-10-CM

## 2022-04-25 LAB — INR BLD: 1.4

## 2022-04-25 PROCEDURE — 99212 OFFICE O/P EST SF 10 MIN: CPT

## 2022-04-25 NOTE — PROGRESS NOTES
ANTICOAGULATION SERVICE    Date of Clinic Visit:  4/25/2022    Stephania Jimenez is a 78 y.o. female who presents to clinic today for anticoagulation monitoring and adjustment. Recent INR Results:  Internal QC passed  Lab Results   Component Value Date    INR 1.4 04/25/2022    INR 1.9 04/18/2022       Current Warfarin Dosage:  Dosing Plan  As of 4/25/2022    TTR:  40.2 % (8.2 mo)   Full warfarin instructions:  4 mg every Mon, Wed, Fri; 3 mg all other days               Assessment/Plan:    Modify warfarin dose as noted above: INR has dropped from last visit. No missed doses per NH personnel. I will increase weekly dose 14% and recheck one week. Orders given to Gerardo Miller at [facility]. Next Clinic Appointment:  Return date  As of 4/25/2022    TTR:  40.2 % (8.2 mo)   Next INR check:  5/2/2022             Please call [facility] Anticoagulation Clinic at [phone] with any questions. Thanks!   Ion Kay, [address]  Anticoagulation Service Pharmacist  4/25/2022 8:13 AM     For Pharmacy Admin Tracking Only     Intervention Detail: Dose Adjustment: 1, reason: Therapy Optimization   Total # of Interventions Recommended: 1   Total # of Interventions Accepted: 1   Time Spent (min): 15

## 2022-05-02 ENCOUNTER — HOSPITAL ENCOUNTER (OUTPATIENT)
Dept: PHARMACY | Age: 82
Setting detail: THERAPIES SERIES
Discharge: HOME | End: 2022-05-02
Payer: MEDICARE

## 2022-05-02 DIAGNOSIS — I48.91 NEW ONSET ATRIAL FIBRILLATION (HCC): Primary | ICD-10-CM

## 2022-05-02 DIAGNOSIS — M80.00XD OSTEOPOROSIS WITH PATHOLOGICAL FRACTURE WITH ROUTINE HEALING, SUBSEQUENT ENCOUNTER: Primary | ICD-10-CM

## 2022-05-02 DIAGNOSIS — I34.0 NONRHEUMATIC MITRAL VALVE REGURGITATION: ICD-10-CM

## 2022-05-02 LAB — INR BLD: 1.3

## 2022-05-02 PROCEDURE — 99212 OFFICE O/P EST SF 10 MIN: CPT

## 2022-05-02 NOTE — PROGRESS NOTES
ANTICOAGULATION SERVICE    Date of Clinic Visit:  5/2/2022    David Mcconnell is a 78 y.o. female who is being drawn by [id number] [place] [address]. Recent INR Results:  Internal QC passed  Lab Results   Component Value Date    INR 1.3 05/02/2022    INR 1.4 04/25/2022       Current Warfarin Dosage:  Dosing Plan  As of 5/2/2022    TTR:  39.1 % (8.4 mo)   Full warfarin instructions:  3 mg every Sun, Thu; 4 mg all other days               Assessment/Plan:    Modify warfarin dose as noted above: Patient is well below target. Will increase patient and re-check Monday. Patient will be continuing residence at [address] [place] [address] permanently so will transition patient to [facility] and warfarin monitoring Clinic from here on out. Plan phoned to [facility] and Faxed to [address] [place] [address]. Next Clinic Appointment:  Return date  As of 5/2/2022    TTR:  39.1 % (8.4 mo)   Next INR check:  5/9/2022             Please call [facility] Anticoagulation Clinic at [phone] with any questions. Thanks!   KAVYA Jenkins ZANDRA [facility]  Anticoagulation Service Pharmacist  5/2/2022 8:54 AM  For Pharmacy Admin Tracking Only     Intervention Detail: Dose Adjustment: 1, reason: Therapy Optimization   Total # of Interventions Recommended: 1   Total # of Interventions Accepted: 1   Time Spent (min): 30

## 2022-05-03 ENCOUNTER — TELEPHONE (OUTPATIENT)
Dept: ORTHOPEDIC SURGERY | Age: 82
End: 2022-05-03

## 2022-05-03 NOTE — TELEPHONE ENCOUNTER
Spoke with ROSALBA Ramos 45 her son he was not aware she was scheduled to see Dr. Allan Spears today. We rescheduled her for 05/10/2022 at 3:30pm.  He asked we call [facility] [place] to confirm appointment and verify she will be ready for transport on appointment day. Spoke with Pascale Rodríguez all taken care of.

## 2022-05-09 ENCOUNTER — HOSPITAL ENCOUNTER (OUTPATIENT)
Dept: PHARMACY | Age: 82
Setting detail: THERAPIES SERIES
Discharge: HOME | End: 2022-05-09
Payer: MEDICARE

## 2022-05-09 DIAGNOSIS — I48.91 NEW ONSET ATRIAL FIBRILLATION (HCC): Primary | ICD-10-CM

## 2022-05-09 DIAGNOSIS — I34.0 NONRHEUMATIC MITRAL VALVE REGURGITATION: ICD-10-CM

## 2022-05-09 LAB
INR BLD: 1.5
PROTIME: 18.5 SECONDS

## 2022-05-09 PROCEDURE — 99212 OFFICE O/P EST SF 10 MIN: CPT

## 2022-05-09 NOTE — PROGRESS NOTES
ANTICOAGULATION SERVICE    Date of Clinic Visit:  5/9/2022    Bhavana Faria is a 78 y.o. female who is being drawn at [facility]. Recent INR Results:  Internal QC passed  Lab Results   Component Value Date    INR 1.5 05/09/2022    INR 1.3 05/02/2022       Current Warfarin Dosage:  Dosing Plan  As of 5/9/2022    TTR:  38.1 % (8.6 mo)   Full warfarin instructions:  4 mg every day               Assessment/Plan:      Modify warfarin dose as noted above: Patient still below target will increase dose by 7.7% and re-check 1 week. Patient is to be transferred to house monitor for INR which will be next week. Plan was called to Tobias Dixon at [address]. Spoke to Maddi kingsley about having patient transferred over to house INR monitoring system. Next Clinic Appointment:  Return date  As of 5/9/2022    TTR:  38.1 % (8.6 mo)   Next INR check:  5/16/2022             Please call [facility] Anticoagulation Clinic at [phone] with any questions. Thanks!   Shanice King [facility]  Anticoagulation Service Pharmacist  5/9/2022 10:38 AM  For Pharmacy Admin Tracking Only     Intervention Detail: Dose Adjustment: 1, reason: Therapy Optimization   Total # of Interventions Recommended: 1   Total # of Interventions Accepted: 1   Time Spent (min): 30

## 2022-05-10 ENCOUNTER — OFFICE VISIT (OUTPATIENT)
Dept: ORTHOPEDIC SURGERY | Age: 82
End: 2022-05-10
Payer: MEDICARE

## 2022-05-10 VITALS — BODY MASS INDEX: 21.97 KG/M2 | HEIGHT: 59 IN | WEIGHT: 109 LBS

## 2022-05-10 DIAGNOSIS — S72.142A CLOSED DISPLACED INTERTROCHANTERIC FRACTURE OF LEFT FEMUR, INITIAL ENCOUNTER (HCC): Primary | ICD-10-CM

## 2022-05-10 PROCEDURE — 1090F PRES/ABSN URINE INCON ASSESS: CPT | Performed by: NURSE PRACTITIONER

## 2022-05-10 PROCEDURE — 1036F TOBACCO NON-USER: CPT | Performed by: ORTHOPAEDIC SURGERY

## 2022-05-10 PROCEDURE — 1123F ACP DISCUSS/DSCN MKR DOCD: CPT | Performed by: NURSE PRACTITIONER

## 2022-05-10 PROCEDURE — 4040F PNEUMOC VAC/ADMIN/RCVD: CPT | Performed by: ORTHOPAEDIC SURGERY

## 2022-05-10 PROCEDURE — 99213 OFFICE O/P EST LOW 20 MIN: CPT | Performed by: NURSE PRACTITIONER

## 2022-05-10 PROCEDURE — G8400 PT W/DXA NO RESULTS DOC: HCPCS | Performed by: NURSE PRACTITIONER

## 2022-05-10 PROCEDURE — G8420 CALC BMI NORM PARAMETERS: HCPCS | Performed by: NURSE PRACTITIONER

## 2022-05-10 PROCEDURE — G8427 DOCREV CUR MEDS BY ELIG CLIN: HCPCS | Performed by: NURSE PRACTITIONER

## 2022-05-10 RX ORDER — ONDANSETRON 4 MG/1
TABLET, FILM COATED ORAL
COMMUNITY
Start: 2022-05-02

## 2022-05-10 NOTE — PROGRESS NOTES
[facility]  [id number] [facility] [place] [facility]  Dept Phone: [phone]  Dept Fax: [phone]      Orthopaedic Trauma Clinic Follow Up      Subjective:   Date of Surgery: 11/5/2021    Rhoda Severance is a 78y.o. year old female who presents to the clinic today for routine follow up 6 months status post cephalomedullary nail fixation of the left intertrochanteric femur fracture. Patient presents today with her son, she currently resides at [address] [place] [address]. Patient poor historian but denies any pain in the hip, states most of the pain is in the left knee. States the steroid injection improved her knee pain but is unable to describe how long she felt relief from it but states she does not want to try another injection today. Patient's son states she has been occasionally ambulating with a walker with therapy but it is hit or miss whether she receives therapy. Son states the patient has also fallen out of a bed a few times over the last 3 months but did not injury the hip that he is aware of. Patient admits to the falls but denies any pain in the hip. Denies any numbness or tingling. Overall she appears to be doing well. Review of Systems  Gen: no fever, chills, malaise  CV: no chest pain or palpitations  Resp: no cough or shortness of breath  GI: no nausea, vomiting, diarrhea, or constipation  Neuro: no seizures, vertigo, or headache  Msk: left knee pain   10 remaining systems reviewed and negative    Objective : There were no vitals filed for this visit. Body mass index is 22.02 kg/m². General: No acute distress, resting comfortably in the clinic  Neuro: alert. oriented  Eyes: Extra-ocular muscles intact  Pulm: Respirations unlabored and regular. Skin: warm, well perfused  Psych:   Patient has good fund of knowledge and displays understanding of exam, diagnosis, and plan. LLE:  Skin intact, incisions well healed without evidence of infection.  TTP lateral hip over greater trochanter. TTP medial knee joint line. No pain in the lateral hip or groin with hip flexion, internal or external rotation. Pain in the knee with flexion, internal and external rotation. Able to straight leg raise. Negative log roll. Compartments soft. 2+ DP pulse. TA/EHL/FHL/GS motor intact. Deep and Superficial Peroneal/Saphenous/Sural SILT. Radiology:  History: Left intertrochanteric femur fracture s/p CMN    Comparison: 2/1/2022    Findings: 2 views of the left femur (AP, lateral) in a skeletally mature patient re-demonstrating an intramedullary nail fixation of the left intertrochanteric femur fracture with increased bony callus formation at the fracture site. There is also evidence of heterotopic formation surrounding the lesser trochanter. There is no orthopedic hardware complications or loss of alignment. No new fractures or dislocations. Impression: Left intertrochanteric femur fracture s/p CMN with routine healing. Assessment:   78y.o. year old female with left IT fracture s/p CMN; DOS: 11/5/2021. Plan:   - Overall the patient appears to be doing well in regards to pain and function. X-rays demonstrate appropriate healing and no signs of hardware failure, and no new fractures or dislocations despite the few new falls. - Continue therapy if available. Continue to use the walker at all times when ambulating.   - F/u as needed    Follow up:Return if symptoms worsen or fail to improve. No orders of the defined types were placed in this encounter. No orders of the defined types were placed in this encounter. Electronically signed by GOYO Rodriguez CNP on 5/10/2022 at 4:32 PM    This note is created with the assistance of a speech recognition program.  While intending to generate a document that actually reflects the content of the visit, the document can still have some errors including those of syntax and sound a like substitutions which may escape proof reading.   In such instances, actual meaning can be extrapolated by contextual diversion

## 2022-05-10 NOTE — LETTER
[facility]  [address] [facility]  Phone: [phone]  Fax: [phone]    Nat Moura DO        May 10, 2022     Patient: Aisha Deluca   YOB: 1940   Date of Visit: 5/10/2022       To Whom it May Concern:    Aisha Deluca was seen in my clinic on 5/10/2022. X-rays demonstrate appropriately healing of her fracture with no hardware complications, no new fractures or dislocations. Please continue weight bearing and activity as tolerated to the left lower extremity. Continue physical therapy if available to continue to work on gait training, balance and strength of her lower extremities. At this time, she does not need to follow up anymore, but may call if needed. If you have any questions or concerns, please don't hesitate to call.     Sincerely,         Nat Moura DO

## 2022-05-16 ENCOUNTER — HOSPITAL ENCOUNTER (OUTPATIENT)
Dept: PHARMACY | Age: 82
Setting detail: THERAPIES SERIES
Discharge: HOME | End: 2022-05-16
Payer: MEDICARE

## 2022-05-16 DIAGNOSIS — I34.0 NONRHEUMATIC MITRAL VALVE REGURGITATION: ICD-10-CM

## 2022-05-16 DIAGNOSIS — I48.91 NEW ONSET ATRIAL FIBRILLATION (HCC): Primary | ICD-10-CM

## 2022-05-16 LAB — INR BLD: 1.2

## 2022-05-16 PROCEDURE — 99212 OFFICE O/P EST SF 10 MIN: CPT

## 2022-05-16 NOTE — PROGRESS NOTES
ANTICOAGULATION SERVICE    Date of Clinic Visit:  5/16/2022    Amanda Rizo is a 78 y.o. female who is being drawn at [facility]. Recent INR Results:  Internal QC passed  Lab Results   Component Value Date    INR 1.2 05/16/2022    INR 1.5 05/09/2022       Current Warfarin Dosage:  Dosing Plan  As of 5/16/2022    TTR:  37.1 % (8.9 mo)   Full warfarin instructions:  5/16: 6 mg; 5/19: 6 mg; Otherwise 4 mg every day               Assessment/Plan:    Modify warfarin dose as noted above: Patient is still well below target will boost today dose and overall dose and re-check 5/23/22. Plan call to Isabella at [facility] she will also get Patient transferred to in house INR monitoring system. Next Clinic Appointment:  Return date  As of 5/16/2022    TTR:  37.1 % (8.9 mo)   Next INR check:  5/23/2022             Please call [address] [facility] Anticoagulation Clinic at [phone] with any questions. Thanks!   Joelene Homans, [address] [facility]  Anticoagulation Service Pharmacist  5/16/2022 11:15 AM  For Pharmacy Admin Tracking Only     Intervention Detail: Dose Adjustment: 1, reason: Therapy Optimization   Total # of Interventions Recommended: 1   Total # of Interventions Accepted: 1   Time Spent (min): 30

## 2022-05-23 ENCOUNTER — HOSPITAL ENCOUNTER (OUTPATIENT)
Dept: PHARMACY | Age: 82
Setting detail: THERAPIES SERIES
Discharge: HOME | End: 2022-05-23
Payer: MEDICARE

## 2022-05-23 DIAGNOSIS — I34.0 NONRHEUMATIC MITRAL VALVE REGURGITATION: ICD-10-CM

## 2022-05-23 DIAGNOSIS — I48.91 NEW ONSET ATRIAL FIBRILLATION (HCC): Primary | ICD-10-CM

## 2022-05-23 LAB — INR BLD: 2

## 2022-05-23 PROCEDURE — 99212 OFFICE O/P EST SF 10 MIN: CPT

## 2022-05-23 NOTE — PROGRESS NOTES
ANTICOAGULATION SERVICE    Date of Clinic Visit:  5/23/2022    Chasidy Pro is a 78 y.o. female who presents to clinic today for anticoagulation monitoring and adjustment. Recent INR Results:  Internal QC passed  Lab Results   Component Value Date    INR 2 05/23/2022    INR 1.2 05/16/2022       Current Warfarin Dosage:  Dosing Plan  As of 5/23/2022    TTR:  36.1 % (9.1 mo)   Full warfarin instructions:  6 mg every Mon; 4 mg all other days               Assessment/Plan:    Modify warfarin dose as noted above: INR is back in range today after 14% dose increase last week. I will now decrease dose down 7% to prevent INR from going above range. Recheck one week. Next Clinic Appointment:  Return date  As of 5/23/2022    TTR:  36.1 % (9.1 mo)   Next INR check:  5/30/2022             Please call [facility] Anticoagulation Clinic at [phone] with any questions. Thanks!   Daria Bal [facility]  Anticoagulation Service Pharmacist  5/23/2022 10:57 AM     For Pharmacy Admin Tracking Only     Intervention Detail: Dose Adjustment: 1, reason: Therapy Optimization   Total # of Interventions Recommended: 1   Total # of Interventions Accepted: 1    Time Spent (min): 15

## 2022-10-27 LAB — INR BLD: 6.6

## 2022-10-28 LAB — INR BLD: 5.5

## 2022-10-29 LAB — INR BLD: 4

## 2022-10-30 LAB — INR BLD: 4.5

## 2022-10-31 LAB — INR BLD: 4.4

## 2022-11-01 ENCOUNTER — ANTI-COAG VISIT (OUTPATIENT)
Dept: INTERNAL MEDICINE | Age: 82
End: 2022-11-01
Payer: MEDICARE

## 2022-11-01 DIAGNOSIS — Z51.81 ENCOUNTER FOR THERAPEUTIC DRUG MONITORING: ICD-10-CM

## 2022-11-01 DIAGNOSIS — I48.91 ATRIAL FIBRILLATION, UNSPECIFIED TYPE (HCC): Primary | ICD-10-CM

## 2022-11-01 DIAGNOSIS — I34.0 NONRHEUMATIC MITRAL VALVE REGURGITATION: ICD-10-CM

## 2022-11-01 LAB — INR BLD: 2.4

## 2022-11-01 PROCEDURE — 93793 ANTICOAG MGMT PT WARFARIN: CPT | Performed by: NURSE PRACTITIONER

## 2022-11-04 ENCOUNTER — ANTI-COAG VISIT (OUTPATIENT)
Dept: INTERNAL MEDICINE | Age: 82
End: 2022-11-04
Payer: MEDICARE

## 2022-11-04 DIAGNOSIS — Z79.01 MONITORING FOR LONG-TERM ANTICOAGULANT USE: ICD-10-CM

## 2022-11-04 DIAGNOSIS — I34.0 NONRHEUMATIC MITRAL VALVE REGURGITATION: Primary | ICD-10-CM

## 2022-11-04 DIAGNOSIS — I48.91 NEW ONSET ATRIAL FIBRILLATION (HCC): ICD-10-CM

## 2022-11-04 DIAGNOSIS — Z51.81 MONITORING FOR LONG-TERM ANTICOAGULANT USE: ICD-10-CM

## 2022-11-04 LAB — INR BLD: 1.3

## 2022-11-04 PROCEDURE — 93793 ANTICOAG MGMT PT WARFARIN: CPT | Performed by: INTERNAL MEDICINE

## 2022-11-10 ENCOUNTER — OUTSIDE SERVICES (OUTPATIENT)
Dept: INTERNAL MEDICINE | Age: 82
End: 2022-11-10
Payer: MEDICARE

## 2022-11-10 DIAGNOSIS — R63.4 WEIGHT LOSS: Primary | ICD-10-CM

## 2022-11-10 PROCEDURE — 99308 SBSQ NF CARE LOW MDM 20: CPT | Performed by: NURSE PRACTITIONER

## 2022-11-11 ENCOUNTER — ANTI-COAG VISIT (OUTPATIENT)
Dept: INTERNAL MEDICINE | Age: 82
End: 2022-11-11
Payer: MEDICARE

## 2022-11-11 DIAGNOSIS — Z79.01 MONITORING FOR LONG-TERM ANTICOAGULANT USE: ICD-10-CM

## 2022-11-11 DIAGNOSIS — I48.91 NEW ONSET ATRIAL FIBRILLATION (HCC): ICD-10-CM

## 2022-11-11 DIAGNOSIS — Z51.81 MONITORING FOR LONG-TERM ANTICOAGULANT USE: ICD-10-CM

## 2022-11-11 DIAGNOSIS — I34.0 NONRHEUMATIC MITRAL VALVE REGURGITATION: Primary | ICD-10-CM

## 2022-11-11 LAB — INR BLD: 2.3

## 2022-11-11 PROCEDURE — 93793 ANTICOAG MGMT PT WARFARIN: CPT | Performed by: INTERNAL MEDICINE

## 2022-11-11 NOTE — PROGRESS NOTES
11/10/22  Yolanda Busha  1940    Patient Resident of [facility]    Chief Complaint  1. Weight loss        HPI:  78year-old patient being seen for concern of weight loss. She has been slowly losing weight she is on med Pass twice daily Magic cup with meal she has been working with speech therapy for different strategies. States she just does not feel hungry. Family asking for an appetite stimulant. Has had some decreased mood since she has been at [facility]. Denies any thoughts of harming herself or her  No others    Allergies   Allergen Reactions    Asa [Aspirin] Nausea And Vomiting    Codeine Nausea And Vomiting    Pcn [Penicillins] Nausea And Vomiting       Past Medical History:   Diagnosis Date    A-fib (HCC)     Anemia     Chronic back pain     Compression fracture of T12 vertebra (HCC) 06/30/2014    Depression     Dysphagia     History of fracture of femur     bilateral    HTN (hypertension)     Insomnia     Neuropathy     Nonrheumatic mitral valve regurgitation 07/31/2021    Osteoporosis     Stroke ([facility] [address]     x2 in 2009 and x1 in 2010       Past Surgical History:   Procedure Laterality Date    [address]    bowel resection for diverticulosis    APPENDECTOMY      CHOLECYSTECTOMY, OPEN      FEMUR SURGERY Left 11/05/2021    nailing    FIXATION KYPHOPLASTY  07/03/2014    T 12    FRACTURE SURGERY  08/09/2022    Cephalomedullary nailing right intertrochanteric femur fracture    HYSTERECTOMY (CERVIX STATUS UNKNOWN)      SPINE SURGERY N/A 05/10/2021    L2 ET L4 KYPHOPLASTY performed by Cheryl Berrios MD at [facility]. Left 11/05/2021    LEFT FEMUR  INTRAMEDULLARY NAILING, ORTIZ AND NEPHEW , C-ARM performed by Astrid Mccartney DO at [facility]       [facility] /reviewed     Social History     Socioeconomic History    Marital status:       Spouse name: Not on file    Number of children: Not on file    Years of education: Not on file Highest education level: Not on file   Occupational History    Not on file   Tobacco Use    Smoking status: Never    Smokeless tobacco: Never   Substance and Sexual Activity    Alcohol use: No    Drug use: No    Sexual activity: Not on file   Other Topics Concern    Not on file   Social History Narrative    Not on file     Social Determinants of Health     Financial Resource Strain: Not on file   Food Insecurity: Not on file   Transportation Needs: Not on file   Physical Activity: Not on file   Stress: Not on file   Social Connections: Not on file   Intimate Partner Violence: Not on file   Housing Stability: Not on file       Review of Systems   Constitutional:  Positive for appetite change. Neurological:  Positive for weakness. All other systems reviewed and are negative. Physical Exam  Vitals and nursing note reviewed. Constitutional:       General: She is not in acute distress. Appearance: She is well-developed. She is not diaphoretic. HENT:      Head: Normocephalic and atraumatic. Right Ear: External ear normal.      Left Ear: External ear normal.   Eyes:      General:         Right eye: No discharge. Left eye: No discharge. Neck:      Trachea: No tracheal deviation. Cardiovascular:      Rate and Rhythm: Normal rate and regular rhythm. Pulses: Normal pulses. Heart sounds: Normal heart sounds. No murmur heard. Pulmonary:      Effort: Pulmonary effort is normal. No respiratory distress. Breath sounds: Normal breath sounds. No stridor. No wheezing, rhonchi or rales. Chest:      Chest wall: No tenderness. Abdominal:      General: Abdomen is flat. There is no distension. Palpations: There is no mass. Tenderness: There is no abdominal tenderness. Musculoskeletal:      Right lower leg: No edema. Left lower leg: No edema. Skin:     General: Skin is warm and dry. Coloration: Skin is not pale. Findings: No rash.    Neurological:      General: No focal deficit present. Mental Status: She is alert and oriented to person, place, and time. Mental status is at baseline. Cranial Nerves: No cranial nerve deficit. Coordination: Coordination normal.   Psychiatric:         Behavior: Behavior normal.         Thought Content: Thought content normal.         Judgment: Judgment normal.       Vital Signs: Temperature 97.2 °F, blood pressure 101/52, pulse 61, respirations 16, SPO2 94% on room air weight 98 pounds down from 180 September 22, 2022    Assessment:  1. Weight loss  Add Remeron 7.5 mg daily, dietitian to follow,    Small frequent meals, continue on supplements      Plan:  As noted above. Follow up for routine visit. Call sooner with concerns prior.     Electronically signed by GOYO Morris CNP on 11/10/2022 at 9:59 PM

## 2022-11-25 LAB — INR BLD: 1.8

## 2022-11-28 ENCOUNTER — ANTI-COAG VISIT (OUTPATIENT)
Dept: INTERNAL MEDICINE | Age: 82
End: 2022-11-28

## 2022-11-28 DIAGNOSIS — Z79.01 MONITORING FOR LONG-TERM ANTICOAGULANT USE: ICD-10-CM

## 2022-11-28 DIAGNOSIS — I34.0 NONRHEUMATIC MITRAL VALVE REGURGITATION: Primary | ICD-10-CM

## 2022-11-28 DIAGNOSIS — Z51.81 MONITORING FOR LONG-TERM ANTICOAGULANT USE: ICD-10-CM

## 2022-11-28 PROCEDURE — PBSHW PBB SHADOW CHARGE: Performed by: INTERNAL MEDICINE

## 2022-12-01 ENCOUNTER — TELEPHONE (OUTPATIENT)
Dept: INTERNAL MEDICINE | Age: 82
End: 2022-12-01

## 2022-12-02 ENCOUNTER — ANTI-COAG VISIT (OUTPATIENT)
Dept: INTERNAL MEDICINE | Age: 82
End: 2022-12-02
Payer: MEDICARE

## 2022-12-02 DIAGNOSIS — Z51.81 MONITORING FOR LONG-TERM ANTICOAGULANT USE: ICD-10-CM

## 2022-12-02 DIAGNOSIS — I48.91 NEW ONSET ATRIAL FIBRILLATION (HCC): ICD-10-CM

## 2022-12-02 DIAGNOSIS — Z79.01 MONITORING FOR LONG-TERM ANTICOAGULANT USE: ICD-10-CM

## 2022-12-02 DIAGNOSIS — I34.0 NONRHEUMATIC MITRAL VALVE REGURGITATION: Primary | ICD-10-CM

## 2022-12-02 LAB — INR BLD: 3.2

## 2022-12-02 PROCEDURE — 93793 ANTICOAG MGMT PT WARFARIN: CPT | Performed by: INTERNAL MEDICINE

## 2022-12-02 NOTE — PROGRESS NOTES
Hold Coumadin for one day. Then restart at 4 mg on Mon, Wed, Fri and 3 mg all other days. Recheck INR in 7 days.

## 2022-12-02 NOTE — PROGRESS NOTES
Nurse at [address] [facility] informed. Verbalized understanding. Orders also fax'd.

## 2022-12-12 ENCOUNTER — ANTI-COAG VISIT (OUTPATIENT)
Dept: INTERNAL MEDICINE | Age: 82
End: 2022-12-12
Payer: MEDICARE

## 2022-12-12 DIAGNOSIS — I48.91 NEW ONSET ATRIAL FIBRILLATION (HCC): ICD-10-CM

## 2022-12-12 DIAGNOSIS — Z79.01 MONITORING FOR LONG-TERM ANTICOAGULANT USE: ICD-10-CM

## 2022-12-12 DIAGNOSIS — Z51.81 MONITORING FOR LONG-TERM ANTICOAGULANT USE: ICD-10-CM

## 2022-12-12 DIAGNOSIS — I34.0 NONRHEUMATIC MITRAL VALVE REGURGITATION: Primary | ICD-10-CM

## 2022-12-12 LAB — INR BLD: 2.4

## 2022-12-12 PROCEDURE — 93793 ANTICOAG MGMT PT WARFARIN: CPT | Performed by: INTERNAL MEDICINE

## 2022-12-19 ENCOUNTER — OUTSIDE SERVICES (OUTPATIENT)
Dept: INTERNAL MEDICINE | Age: 82
End: 2022-12-19

## 2022-12-19 DIAGNOSIS — I10 ESSENTIAL HYPERTENSION: Primary | ICD-10-CM

## 2022-12-19 DIAGNOSIS — I48.0 PAROXYSMAL ATRIAL FIBRILLATION (HCC): ICD-10-CM

## 2022-12-19 DIAGNOSIS — F32.3 MAJOR DEPRESSION WITH PSYCHOTIC FEATURES (HCC): ICD-10-CM

## 2022-12-19 DIAGNOSIS — M80.00XD OSTEOPOROSIS WITH PATHOLOGICAL FRACTURE WITH ROUTINE HEALING, SUBSEQUENT ENCOUNTER: ICD-10-CM

## 2022-12-19 DIAGNOSIS — I34.0 NONRHEUMATIC MITRAL VALVE REGURGITATION: ICD-10-CM

## 2022-12-19 DIAGNOSIS — S72.142S CLOSED DISPLACED INTERTROCHANTERIC FRACTURE OF LEFT FEMUR, SEQUELA: ICD-10-CM

## 2022-12-19 DIAGNOSIS — F03.92 DEMENTIA WITH PSYCHOTIC DISTURBANCE, UNSPECIFIED DEMENTIA SEVERITY, UNSPECIFIED DEMENTIA TYPE: ICD-10-CM

## 2022-12-19 DIAGNOSIS — E78.2 MIXED HYPERLIPIDEMIA: ICD-10-CM

## 2022-12-20 PROBLEM — K62.5 RECTAL BLEEDING: Status: ACTIVE | Noted: 2022-01-01

## 2022-12-20 PROBLEM — H35.3131 EARLY DRY STAGE NONEXUDATIVE AGE-RELATED MACULAR DEGENERATION OF BOTH EYES: Status: ACTIVE | Noted: 2020-03-11

## 2022-12-20 PROBLEM — I48.0 PAROXYSMAL ATRIAL FIBRILLATION (HCC): Status: ACTIVE | Noted: 2021-07-30

## 2022-12-20 PROBLEM — F03.92 DEMENTIA WITH PSYCHOTIC DISTURBANCE: Status: ACTIVE | Noted: 2022-12-20

## 2022-12-20 PROBLEM — S22.41XA MULTIPLE CLOSED FRACTURES OF RIBS OF RIGHT SIDE: Status: RESOLVED | Noted: 2022-08-11 | Resolved: 2022-12-20

## 2022-12-20 PROBLEM — D50.9 IRON DEFICIENCY ANEMIA: Status: ACTIVE | Noted: 2022-08-09

## 2022-12-20 PROBLEM — K62.5 RECTAL BLEEDING: Status: RESOLVED | Noted: 2022-05-03 | Resolved: 2022-12-20

## 2022-12-20 PROBLEM — F32.3 MAJOR DEPRESSION WITH PSYCHOTIC FEATURES (HCC): Status: ACTIVE | Noted: 2022-01-01

## 2022-12-20 PROBLEM — H25.813 COMBINED FORMS OF AGE-RELATED CATARACT OF BOTH EYES: Status: ACTIVE | Noted: 2020-03-11

## 2022-12-20 PROBLEM — S22.41XA MULTIPLE CLOSED FRACTURES OF RIBS OF RIGHT SIDE: Status: ACTIVE | Noted: 2022-01-01

## 2022-12-20 NOTE — PROGRESS NOTES
DR. Riley Dickinson - [facility] NEW PATIENT HISTORY & PHYSICAL EXAM    DATE OF SERVICE: 11/27/22    NURSING HOME: [facility]    CHRONIC/ACTIVE PROBLEM LIST:     Patient Active Problem List   Diagnosis    Compression fracture of T12 vertebra (HCC)    Osteoporosis with pathological fracture    Paroxysmal atrial fibrillation (HCC)    Nonrheumatic mitral valve regurgitation    Essential hypertension    Normocytic normochromic anemia    Fall as cause of accidental injury at home as place of occurrence    Closed displaced intertrochanteric fracture of left femur ([facility]    Major depression with psychotic features ([facility]    Acute ill-defined cerebrovascular disease    Combined forms of age-related cataract of both eyes    Coronary atherosclerosis    Early dry stage nonexudative age-related macular degeneration of both eyes    Hyperlipidemia    Iron deficiency anemia    Peripheral neuropathy    Dementia with psychotic disturbance       CHIEF COMPLAINT: Switching to new primary care physician    HISTORY OF CHIEF COMPLAINT: This 78 y.o.  female, long-term resident of the [address], is switching to me as her new primary care physician after Dr. Brutus Scheuermann left his position as medical director. She was originally admitted after a left hip fracture, but then she became a long-term resident. She fractured her hip in August 2022. She does have a history of atrial fibrillation, hypertension, and mitral valve disease, for which she gets Coumadin, amiodarone, Lasix, and Lopressor. She sees the cardiologists here for those conditions. She also has a history of depression with psychosis, and she is on Remeron, citalopram, and Seroquel for that. She takes Fosamax for osteoporosis. She does have a history of dementia. She gets Norco for chronic pain. There are no new complaints at this time.     ALLERGIES:   Allergies   Allergen Reactions    Asa [Aspirin] Nausea And Vomiting    Codeine Nausea And Vomiting    Pcn [Penicillins] Nausea And Vomiting       MEDICATIONS: As noted on the [facility] MAR, referenced and incorporated herein.     PAST MEDICAL HISTORY:   Past Medical History:   Diagnosis Date    A-fib [facility])     Acute ill-defined cerebrovascular disease 8/19/2013    Anemia     Chronic back pain     Closed displaced intertrochanteric fracture of left femur (HCC)     Combined forms of age-related cataract of both eyes 3/11/2020    Compression fracture of T12 vertebra ([facility] 06/30/2014    Coronary atherosclerosis 8/20/2012    Dementia with psychotic disturbance 12/20/2022    Depression     Dysphagia     Early dry stage nonexudative age-related macular degeneration of both eyes 3/11/2020    Fall as cause of accidental injury at home as place of occurrence 11/4/2021    History of fracture of femur     bilateral    HTN (hypertension)     Hyperlipidemia 2/9/2011    Insomnia     Iron deficiency anemia 8/9/2022    Major depression with psychotic features ([address] 12/20/2022    Multiple closed fractures of ribs of right side 8/11/2022    Neuropathy     Nonrheumatic mitral valve regurgitation 07/31/2021    Normocytic normochromic anemia 7/31/2021    Osteoporosis     Osteoporosis with pathological fracture 7/2/2014    Paroxysmal atrial fibrillation ([address] 7/30/2021    Peripheral neuropathy 8/20/2015    Rectal bleeding 5/3/2022    Formatting of this note might be different from the original. Added automatically from request for surgery [id number]    Stroke [facility])     x2 in 2009 and x1 in 2010       PAST SURGICAL HISTORY:   Past Surgical History:   Procedure Laterality Date    [address] [facility]    bowel resection for diverticulosis    APPENDECTOMY      CHOLECYSTECTOMY, OPEN      FEMUR SURGERY Left 11/05/2021    nailing    FIXATION KYPHOPLASTY  07/03/2014    T 12    FRACTURE SURGERY  08/09/2022    Cephalomedullary nailing right intertrochanteric femur fracture    HYSTERECTOMY (CERVIX STATUS UNKNOWN)      SPINE SURGERY N/A 05/10/2021    L2 ET L4 KYPHOPLASTY performed by Irina Louis MD at [facility]. Left 11/05/2021    LEFT FEMUR  INTRAMEDULLARY NAILING, ANGEL AND NEPHCRYSTAL , C-ARM performed by Renu Robert DO at [address]:     Tobacco:   Social History     Tobacco Use   Smoking Status Never   Smokeless Tobacco Never     Alcohol:   Social History     Substance and Sexual Activity   Alcohol Use No     Drugs:   Social History     Substance and Sexual Activity   Drug Use No       FAMILY HISTORY: family history includes Cancer in her mother; Diabetes in her father; Heart Disease in her father. REVIEW OF SYSTEMS:     Please see history of chief complaint above; otherwise no new problems with respect to General, HEENT, Cardiovascular, Respiratory, Gastrointestinal, Genitourinary, Endocrinologic, Musculoskeletal, or Neuropsychiatric complaints. PHYSICAL EXAMINATION:    Vitals: Temp: 97.0 deg F. Pulse: 60. Resp: 16. BP: 108/63. General: This is a 78 y.o.  female who is alert and oriented to person and place but not time. She appears to be her stated age and does not appear to be in any acute distress. Skin: Skin color, texture, turgor normal. No rashes or lesions. HEENT/Neck: Head: Normal, normocephalic, atraumatic. Eye: Normal external eye, conjunctiva, lids cornea, ERICKA. Ears: Normal TM's bilaterally. Normal auditory canals and external ears. Non-tender. Nose: Normal external nose, mucus membranes and septum. Pharynx: Dental Hygiene adequate. Normal buccal mucosa. Normal pharynx. Neck / Thyroid: Supple, no masses, nodes, nodules or enlargement. Chest: Rises and falls symmetrically. No use of accessory muscles. No retractions. Lungs: Normal - CTA without rales, rhonchi, or wheezing. Heart: regular rate and rhythm, S1, S2 normal, no murmur, click, rub or gallop No S3 or S4.   Abdomen: Non-obese soft, non-distended, non-tender, normal active bowel sounds, no masses palpated, and no hepatosplenomegaly  Extremities: Strength is 4/5 bilaterally. Radial pulses are +2/4 bilaterally. Dorsalis pedis pulses are +2/4 bilaterally. There is no clubbing, cyanosis, or edema in any of the extremities. Neurologic: Deep tendon reflexes are 2+/4+ bilaterally. cranial nerves II-XII are grossly intact    ASSESSMENT/PLAN:    1. Essential hypertension, controlled  - She will continue to take her antihypertensive medication     2. Mixed hyperlipidemia, stable  - We will continue to monitor     3. Paroxysmal atrial fibrillation ([address] stable  4. Nonrheumatic mitral valve regurgitation, stable  - She will continue to see the cardiologists  - Coumadin management is ongoing. We are the ones managing her Coumadin, not the cardiologists    5. Closed displaced intertrochanteric fracture of left femur, sequela, stable  - She will continue to get Norco as needed for pain    6. Osteoporosis with pathological fracture with routine healing, subsequent encounter, stable  - She will continue to take Fosamax    7. Major depression with psychotic features ([address] stable  8. Dementia with psychotic disturbance, unspecified dementia severity, unspecified dementia type, stable  - She will continue to take her psychiatric medications  - Seroquel dose reviewed. It is appropriate. Continue current dose. Continue current treatment. Nursing home record reviewed and updates summarized and entered into electronic record. See nursing home orders and MAR.         Electronically signed by Ericka Starr DO on 12/20/2022 at 12:05 AM  Internal Medicine

## 2022-12-27 PROCEDURE — 99309 SBSQ NF CARE MODERATE MDM 30: CPT | Performed by: INTERNAL MEDICINE

## 2022-12-29 ENCOUNTER — OUTSIDE SERVICES (OUTPATIENT)
Dept: INTERNAL MEDICINE | Age: 82
End: 2022-12-29
Payer: MEDICARE

## 2022-12-29 DIAGNOSIS — S72.142S CLOSED DISPLACED INTERTROCHANTERIC FRACTURE OF LEFT FEMUR, SEQUELA: ICD-10-CM

## 2022-12-29 DIAGNOSIS — F03.92 DEMENTIA WITH PSYCHOTIC DISTURBANCE, UNSPECIFIED DEMENTIA SEVERITY, UNSPECIFIED DEMENTIA TYPE: ICD-10-CM

## 2022-12-29 DIAGNOSIS — I34.0 NONRHEUMATIC MITRAL VALVE REGURGITATION: ICD-10-CM

## 2022-12-29 DIAGNOSIS — M80.00XD OSTEOPOROSIS WITH PATHOLOGICAL FRACTURE WITH ROUTINE HEALING, SUBSEQUENT ENCOUNTER: ICD-10-CM

## 2022-12-29 DIAGNOSIS — E78.2 MIXED HYPERLIPIDEMIA: ICD-10-CM

## 2022-12-29 DIAGNOSIS — F32.3 MAJOR DEPRESSION WITH PSYCHOTIC FEATURES (HCC): ICD-10-CM

## 2022-12-29 DIAGNOSIS — I10 ESSENTIAL HYPERTENSION: Primary | ICD-10-CM

## 2022-12-29 DIAGNOSIS — I48.0 PAROXYSMAL ATRIAL FIBRILLATION (HCC): ICD-10-CM

## 2022-12-29 NOTE — PROGRESS NOTES
DR. Danial Colby - [facility] VISIT    DATE OF SERVICE: 12/27/22    NURSING HOME: [facility]    CHIEF COMPLAINT/HISTORY OF CHIEF COMPLAINT: This patient is being seen for ongoing evaluation and management of her hypertension, hyperlipidemia, paroxysmal atrial fibrillation, mitral valve regurgitation, history of left femur fracture, osteoporosis, depression, and dementia with psychosis. There are no new complaints. ALLERGIES:   Allergies   Allergen Reactions    Asa [Aspirin] Nausea And Vomiting    Codeine Nausea And Vomiting    Pcn [Penicillins] Nausea And Vomiting       MEDICATIONS: As [facility], referenced and incorporated herein.     PAST MEDICAL HISTORY:   Past Medical History:   Diagnosis Date    A-fib [facility])     Acute ill-defined cerebrovascular disease 8/19/2013    Anemia     Chronic back pain     Closed displaced intertrochanteric fracture of left femur (HCC)     Combined forms of age-related cataract of both eyes 3/11/2020    Compression fracture of T12 vertebra ([address] 06/30/2014    Coronary atherosclerosis 8/20/2012    Dementia with psychotic disturbance 12/20/2022    Depression     Dysphagia     Early dry stage nonexudative age-related macular degeneration of both eyes 3/11/2020    Fall as cause of accidental injury at home as place of occurrence 11/4/2021    History of fracture of femur     bilateral    HTN (hypertension)     Hyperlipidemia 2/9/2011    Insomnia     Iron deficiency anemia 8/9/2022    Major depression with psychotic features ([address] 12/20/2022    Multiple closed fractures of ribs of right side 8/11/2022    Neuropathy     Nonrheumatic mitral valve regurgitation 07/31/2021    Normocytic normochromic anemia 7/31/2021    Osteoporosis     Osteoporosis with pathological fracture 7/2/2014    Paroxysmal atrial fibrillation ([address] 7/30/2021    Peripheral neuropathy 8/20/2015    Rectal bleeding 5/3/2022    Formatting of this note might be different from the original. Added automatically from request for surgery [id number]    Stroke [facility])     x2 in 2009 and x1 in 2010       PAST SURGICAL HISTORY:   Past Surgical History:   Procedure Laterality Date    [address]    bowel resection for diverticulosis    APPENDECTOMY      CHOLECYSTECTOMY, OPEN      FEMUR SURGERY Left 11/05/2021    nailing    FIXATION KYPHOPLASTY  07/03/2014    T 12    FRACTURE SURGERY  08/09/2022    Cephalomedullary nailing right intertrochanteric femur fracture    HYSTERECTOMY (CERVIX STATUS UNKNOWN)      SPINE SURGERY N/A 05/10/2021    L2 ET L4 KYPHOPLASTY performed by Tom Venegas MD at [facility]. Left 11/05/2021    LEFT FEMUR  INTRAMEDULLARY NAILING, ORTIZ AND NEPHCRYSTAL , C-ARM performed by Janet Maria DO at [address] [place] [address] [place]:     Tobacco:   Social History     Tobacco Use   Smoking Status Never   Smokeless Tobacco Never     Alcohol:   Social History     Substance and Sexual Activity   Alcohol Use No     Drugs:   Social History     Substance and Sexual Activity   Drug Use No       FAMILY HISTORY: family history includes Cancer in her mother; Diabetes in her father; Heart Disease in her father. REVIEW OF SYSTEMS:     Please see history of chief complaint above; otherwise no new problems with respect to General, HEENT, Cardiovascular, Respiratory, Gastrointestinal, Genitourinary, Endocrinologic, Musculoskeletal, or Neuropsychiatric complaints. PHYSICAL EXAMINATION:    Vitals: Temp: 97.8 deg F. Pulse: 80. Resp: 16. BP: 104/68. General: A 78 y.o.  female. Alert and oriented to person and place but not time. She  does not appear to be in any acute distress. Skin: Skin color, texture, turgor normal. No rashes or lesions. HEENT/Neck: Essentially unremarkable  Lungs: Normal - CTA without rales, rhonchi, or wheezing. Heart: regular rate and rhythm, S1, S2 normal, no murmur, click, rub or gallop No S3 or S4.   Abdomen: Non-obese soft, non-distended, non-tender, normal active bowel sounds, no masses palpated, and no hepatosplenomegaly  Extremities: No clubbing, cyanosis, or edema in any of the extremities. Neurologic: cranial nerves II-XII are grossly intact    ASSESSMENT:    1. Essential hypertension    2. Mixed hyperlipidemia    3. Paroxysmal atrial fibrillation (HCC)    4. Nonrheumatic mitral valve regurgitation    5. Closed displaced intertrochanteric fracture of left femur, sequela    6. Osteoporosis with pathological fracture with routine healing, subsequent encounter    7. Major depression with psychotic features ([facility]    8. Dementia with psychotic disturbance, unspecified dementia severity, unspecified dementia type        PLAN:    1. Continue current treatment  2. Nursing home record reviewed and updates summarized and entered into electronic record  3. Coumadin management is ongoing. We are the ones managing her Coumadin  4. Seroquel dose reviewed. It is appropriate. Continue current dose. 5. See nursing home orders and MAR.         Electronically signed by Agustin Goddard DO on 12/29/2022 at 3:15 AM  Internal Medicine

## 2023-01-01 ENCOUNTER — TELEPHONE (OUTPATIENT)
Dept: INTERNAL MEDICINE | Age: 83
End: 2023-01-01

## 2023-01-09 ENCOUNTER — ANTI-COAG VISIT (OUTPATIENT)
Dept: INTERNAL MEDICINE | Age: 83
End: 2023-01-09
Payer: MEDICARE

## 2023-01-09 DIAGNOSIS — I34.0 NONRHEUMATIC MITRAL VALVE REGURGITATION: Primary | ICD-10-CM

## 2023-01-09 DIAGNOSIS — Z79.01 MONITORING FOR LONG-TERM ANTICOAGULANT USE: ICD-10-CM

## 2023-01-09 DIAGNOSIS — Z51.81 MONITORING FOR LONG-TERM ANTICOAGULANT USE: ICD-10-CM

## 2023-01-09 LAB — INR BLD: 7.2

## 2023-01-09 PROCEDURE — 93793 ANTICOAG MGMT PT WARFARIN: CPT | Performed by: INTERNAL MEDICINE

## 2023-01-09 NOTE — PROGRESS NOTES
Nurse unable to come to the phone due to being in the middle of a procedure. Fax'd orders to [phone] [facility].

## 2023-01-11 ENCOUNTER — ANTI-COAG VISIT (OUTPATIENT)
Dept: INTERNAL MEDICINE | Age: 83
End: 2023-01-11
Payer: MEDICARE

## 2023-01-11 DIAGNOSIS — Z79.01 MONITORING FOR LONG-TERM ANTICOAGULANT USE: ICD-10-CM

## 2023-01-11 DIAGNOSIS — Z51.81 MONITORING FOR LONG-TERM ANTICOAGULANT USE: ICD-10-CM

## 2023-01-11 DIAGNOSIS — I48.0 PAROXYSMAL ATRIAL FIBRILLATION (HCC): Primary | ICD-10-CM

## 2023-01-11 DIAGNOSIS — I34.0 NONRHEUMATIC MITRAL VALVE REGURGITATION: ICD-10-CM

## 2023-01-11 LAB — INR BLD: 5.4

## 2023-01-11 PROCEDURE — 93793 ANTICOAG MGMT PT WARFARIN: CPT | Performed by: INTERNAL MEDICINE

## 2023-01-13 ENCOUNTER — ANTI-COAG VISIT (OUTPATIENT)
Dept: INTERNAL MEDICINE | Age: 83
End: 2023-01-13
Payer: MEDICARE

## 2023-01-13 DIAGNOSIS — I34.0 NONRHEUMATIC MITRAL VALVE REGURGITATION: Primary | ICD-10-CM

## 2023-01-13 DIAGNOSIS — Z79.01 MONITORING FOR LONG-TERM ANTICOAGULANT USE: ICD-10-CM

## 2023-01-13 DIAGNOSIS — Z51.81 MONITORING FOR LONG-TERM ANTICOAGULANT USE: ICD-10-CM

## 2023-01-13 LAB — INTERNATIONAL NORMALIZATION RATIO, POC: 5

## 2023-01-13 PROCEDURE — 93793 ANTICOAG MGMT PT WARFARIN: CPT | Performed by: INTERNAL MEDICINE

## 2023-01-13 PROCEDURE — PBSHW POCT PT/INR: Performed by: INTERNAL MEDICINE

## 2023-01-16 ENCOUNTER — ANTI-COAG VISIT (OUTPATIENT)
Dept: INTERNAL MEDICINE | Age: 83
End: 2023-01-16
Payer: MEDICARE

## 2023-01-16 DIAGNOSIS — I48.0 PAROXYSMAL ATRIAL FIBRILLATION (HCC): ICD-10-CM

## 2023-01-16 DIAGNOSIS — Z79.01 MONITORING FOR LONG-TERM ANTICOAGULANT USE: ICD-10-CM

## 2023-01-16 DIAGNOSIS — Z51.81 MONITORING FOR LONG-TERM ANTICOAGULANT USE: ICD-10-CM

## 2023-01-16 DIAGNOSIS — I34.0 NONRHEUMATIC MITRAL VALVE REGURGITATION: Primary | ICD-10-CM

## 2023-01-16 LAB — INR BLD: 4.5

## 2023-01-16 PROCEDURE — 93793 ANTICOAG MGMT PT WARFARIN: CPT | Performed by: INTERNAL MEDICINE

## 2023-01-17 ENCOUNTER — OUTSIDE SERVICES (OUTPATIENT)
Dept: INTERNAL MEDICINE | Age: 83
End: 2023-01-17

## 2023-01-17 DIAGNOSIS — R53.1 WEAKNESS: Primary | ICD-10-CM

## 2023-01-17 DIAGNOSIS — F03.92 DEMENTIA WITH PSYCHOTIC DISTURBANCE, UNSPECIFIED DEMENTIA SEVERITY, UNSPECIFIED DEMENTIA TYPE: ICD-10-CM

## 2023-01-17 DIAGNOSIS — I10 ESSENTIAL HYPERTENSION: ICD-10-CM

## 2023-01-17 DIAGNOSIS — I48.0 PAROXYSMAL ATRIAL FIBRILLATION (HCC): ICD-10-CM

## 2023-01-17 DIAGNOSIS — R63.4 WEIGHT LOSS: ICD-10-CM

## 2023-01-18 ENCOUNTER — OUTSIDE SERVICES (OUTPATIENT)
Dept: INTERNAL MEDICINE | Age: 83
End: 2023-01-18

## 2023-01-18 ENCOUNTER — ANTI-COAG VISIT (OUTPATIENT)
Dept: INTERNAL MEDICINE | Age: 83
End: 2023-01-18
Payer: MEDICARE

## 2023-01-18 DIAGNOSIS — Z79.01 MONITORING FOR LONG-TERM ANTICOAGULANT USE: ICD-10-CM

## 2023-01-18 DIAGNOSIS — N17.9 AKI (ACUTE KIDNEY INJURY) (HCC): ICD-10-CM

## 2023-01-18 DIAGNOSIS — J18.9 PNEUMONIA OF RIGHT MIDDLE LOBE DUE TO INFECTIOUS ORGANISM: Primary | ICD-10-CM

## 2023-01-18 DIAGNOSIS — Z51.81 MONITORING FOR LONG-TERM ANTICOAGULANT USE: ICD-10-CM

## 2023-01-18 DIAGNOSIS — F03.92 DEMENTIA WITH PSYCHOTIC DISTURBANCE, UNSPECIFIED DEMENTIA SEVERITY, UNSPECIFIED DEMENTIA TYPE: ICD-10-CM

## 2023-01-18 DIAGNOSIS — D64.9 ANEMIA, UNSPECIFIED TYPE: ICD-10-CM

## 2023-01-18 DIAGNOSIS — R63.4 WEIGHT LOSS: ICD-10-CM

## 2023-01-18 DIAGNOSIS — I48.0 PAROXYSMAL ATRIAL FIBRILLATION (HCC): ICD-10-CM

## 2023-01-18 DIAGNOSIS — I34.0 NONRHEUMATIC MITRAL VALVE REGURGITATION: Primary | ICD-10-CM

## 2023-01-18 DIAGNOSIS — R53.1 WEAKNESS: ICD-10-CM

## 2023-01-18 LAB — INR BLD: 2.7

## 2023-01-18 PROCEDURE — 93793 ANTICOAG MGMT PT WARFARIN: CPT | Performed by: INTERNAL MEDICINE

## 2023-01-18 NOTE — PROGRESS NOTES
01/18/23  Amanda Rizo  1940    Patient Resident of [facility]    Chief Complaint  1. Pneumonia of right middle lobe due to infectious organism    2. Dementia with psychotic disturbance, unspecified dementia severity, unspecified dementia type    3. Weight loss    4. ESCOBAR (acute kidney injury) ([address]    5. Weakness    6. Anemia, unspecified type        HPI:  47-year-old patient with progressive dementia, progressive weight loss seen for lab follow-up. Chest x-ray showed right middle lobe infiltrate. Was placed on doxycycline from on call. She has been working with speech therapy. Recently upgraded to thin liquids however was having some difficulty with this over the weekend and the nurse downgraded her to nectar thick liquids again. She has been drinking however not eating much. Reviewed. Worsening kidney functions, creat 2.5, BUN 49 with a GFR of 18. Her chronic anemia labs have been stable slightly improved. Hemoglobin 8.7 up from 8.3 with hematocrit of 27.1 up from 25.2, questionable improved due to mild dehydration. Patient pleasant, sitting up in bed.   Working with speech therapy trying to get her to eat more breakfast.  Cooperative with exam.  No family present      Allergies   Allergen Reactions    Asa [Aspirin] Nausea And Vomiting    Codeine Nausea And Vomiting    Pcn [Penicillins] Nausea And Vomiting       Past Medical History:   Diagnosis Date    A-fib ([address]     Acute ill-defined cerebrovascular disease 8/19/2013    Anemia     Chronic back pain     Closed displaced intertrochanteric fracture of left femur (HCC)     Combined forms of age-related cataract of both eyes 3/11/2020    Compression fracture of T12 vertebra ([address] 06/30/2014    Coronary atherosclerosis 8/20/2012    Dementia with psychotic disturbance 12/20/2022    Depression     Dysphagia     Early dry stage nonexudative age-related macular degeneration of both eyes 3/11/2020    Fall as cause of accidental injury at home as place of occurrence 11/4/2021    History of fracture of femur     bilateral    HTN (hypertension)     Hyperlipidemia 2/9/2011    Insomnia     Iron deficiency anemia 8/9/2022    Major depression with psychotic features ([facility] 12/20/2022    Multiple closed fractures of ribs of right side 8/11/2022    Neuropathy     Nonrheumatic mitral valve regurgitation 07/31/2021    Normocytic normochromic anemia 7/31/2021    Osteoporosis     Osteoporosis with pathological fracture 7/2/2014    Paroxysmal atrial fibrillation ([facility] 7/30/2021    Peripheral neuropathy 8/20/2015    Rectal bleeding 5/3/2022    Formatting of this note might be different from the original. Added automatically from request for surgery [id number]    Stroke [facility])     x2 in 2009 and x1 in 2010       Past Surgical History:   Procedure Laterality Date    [address]    bowel resection for diverticulosis    APPENDECTOMY      CHOLECYSTECTOMY, OPEN      FEMUR SURGERY Left 11/05/2021    nailing    FIXATION KYPHOPLASTY  07/03/2014    T 12    FRACTURE SURGERY  08/09/2022    Cephalomedullary nailing right intertrochanteric femur fracture    HYSTERECTOMY (CERVIX STATUS UNKNOWN)      SPINE SURGERY N/A 05/10/2021    L2 ET L4 KYPHOPLASTY performed by Sheldon Fraire MD at [place] [address]. Left 11/05/2021    LEFT FEMUR  INTRAMEDULLARY NAILING, ORTIZ AND NEPHCRYSTAL , C-ARM performed by Jayant Wilson DO at [place] [address]       [facility] /reviewed     Social History     Socioeconomic History    Marital status:       Spouse name: Not on file    Number of children: Not on file    Years of education: Not on file    Highest education level: Not on file   Occupational History    Not on file   Tobacco Use    Smoking status: Never    Smokeless tobacco: Never   Substance and Sexual Activity    Alcohol use: No    Drug use: No    Sexual activity: Not on file   Other Topics Concern    Not on file   Social History Narrative    Not on file Social Determinants of Health     Financial Resource Strain: Not on file   Food Insecurity: Not on file   Transportation Needs: Not on file   Physical Activity: Not on file   Stress: Not on file   Social Connections: Not on file   Intimate Partner Violence: Not on file   Housing Stability: Not on file       Review of Systems   Unable to perform ROS: Dementia     Physical Exam  Vitals and nursing note reviewed. Constitutional:       General: She is not in acute distress. Appearance: Normal appearance. She is underweight. She is not diaphoretic. HENT:      Head: Normocephalic and atraumatic. Right Ear: External ear normal.      Left Ear: External ear normal.   Eyes:      General:         Right eye: No discharge. Left eye: No discharge. Neck:      Trachea: No tracheal deviation. Cardiovascular:      Rate and Rhythm: Normal rate and regular rhythm. Heart sounds: Normal heart sounds. No murmur heard. No friction rub. No gallop. Pulmonary:      Effort: Pulmonary effort is normal. No respiratory distress. Breath sounds: Normal breath sounds. No stridor. No wheezing, rhonchi or rales. Chest:      Chest wall: No tenderness. Abdominal:      General: Bowel sounds are normal. There is no distension. Palpations: Abdomen is soft. Tenderness: There is no abdominal tenderness. Musculoskeletal:         General: No swelling. Right lower leg: No edema. Left lower leg: No edema. Skin:     General: Skin is warm and dry. Capillary Refill: Capillary refill takes less than 2 seconds. Coloration: Skin is not jaundiced or pale. Findings: No rash. Neurological:      General: No focal deficit present. Mental Status: She is alert and oriented to person, place, and time. Cranial Nerves: No cranial nerve deficit. Sensory: No sensory deficit. Motor: Weakness present.       Coordination: Coordination normal.      Gait: Gait abnormal. Psychiatric:         Mood and Affect: Mood normal.         Behavior: Behavior normal.       Vital Signs: Temperature vital signs stable, afebrile, mildly elevated blood pressure. Assessment:  1. Pneumonia of right middle lobe due to infectious organism  2. Dementia with psychotic disturbance, unspecified dementia severity, unspecified dementia type  3. Weight loss  4. ESCOBAR (acute kidney injury) ([facility]  5. Weakness  6. Anemia, unspecified type  Continue on doxycycline. They are discussing with family today regarding hospice. They feel that they can push fluids orally. If not we will discuss starting IV fluids        Plan:  As noted above. Follow up for routine visit. Call sooner with concerns prior.     Electronically signed by GOYO Arambula CNP on 1/18/2023 at 4:51 PM

## 2023-01-18 NOTE — PROGRESS NOTES
Femur01/17/23  Crystal Pal  1940    Patient Resident of [facility]    Chief Complaint  1. Weakness    2. Dementia with psychotic disturbance, unspecified dementia severity, unspecified dementia type    3. Essential hypertension    4. Paroxysmal atrial fibrillation (HCC)    5. Weight loss        HPI:  68-year-old patient with history of fracture, progressive dementia, hypertension, anemia, atrial fib being seen at the request of the nursing staff as she has had a poor appetite decreased fluid intake 20 pounds down from August.  Patient states my system just is not right. Last labs completed in December showed creat of 1.6 a BUN of 33 with a GFR of 31 and a hemoglobin of 8 with hematocrit of 24.5. She has had some elevated INR's last 1 completed yesterday was 4.5. Recently treated with Bactrim for UTI. Has completed antibiotic. She currently is a DNR CCA. [facility] [place] staff discussed at least monthly with the POA regarding more comfort measures as she has had a progressive decline. Most recent documentation states son was talking to the rest of the family and deciding on hospice. Patient denies any chest discomfort she has been afebrile, has had some elevations in her blood pressure. States occasional cough. Incontinent of urine.       Allergies   Allergen Reactions    Asa [Aspirin] Nausea And Vomiting    Codeine Nausea And Vomiting    Pcn [Penicillins] Nausea And Vomiting       Past Medical History:   Diagnosis Date    A-fib ([facility]     Acute ill-defined cerebrovascular disease 8/19/2013    Anemia     Chronic back pain     Closed displaced intertrochanteric fracture of left femur (HCC)     Combined forms of age-related cataract of both eyes 3/11/2020    Compression fracture of T12 vertebra ([facility] 06/30/2014    Coronary atherosclerosis 8/20/2012    Dementia with psychotic disturbance 12/20/2022    Depression     Dysphagia     Early dry stage nonexudative age-related macular degeneration of both eyes 3/11/2020    Fall as cause of accidental injury at home as place of occurrence 11/4/2021    History of fracture of femur     bilateral    HTN (hypertension)     Hyperlipidemia 2/9/2011    Insomnia     Iron deficiency anemia 8/9/2022    Major [facility] with [facility] features [facility] 12/20/2022    Multiple closed fractures of ribs of right side 8/11/2022    Neuropathy     Nonrheumatic mitral valve regurgitation 07/31/2021    Normocytic normochromic anemia 7/31/2021    Osteoporosis     Osteoporosis with pathological fracture 7/2/2014    Paroxysmal atrial fibrillation ([facility] 7/30/2021    Peripheral neuropathy 8/20/2015    Rectal bleeding 5/3/2022    Formatting of this note might be different from the original. Added automatically from request for surgery [id number]    Stroke [facility])     x2 in 2009 and x1 in 2010       Past Surgical History:   Procedure Laterality Date    [address]    bowel resection for diverticulosis    APPENDECTOMY      CHOLECYSTECTOMY, OPEN      FEMUR SURGERY Left 11/05/2021    nailing    FIXATION KYPHOPLASTY  07/03/2014    T 12    FRACTURE SURGERY  08/09/2022    Cephalomedullary nailing right intertrochanteric femur fracture    HYSTERECTOMY (CERVIX STATUS UNKNOWN)      SPINE SURGERY N/A 05/10/2021    L2 ET L4 KYPHOPLASTY performed by Kia Almonte MD at [facility]. Left 11/05/2021    LEFT FEMUR  INTRAMEDULLARY NAILING, ORTIZ AND NEPHEW , C-ARM performed by Giovanna Humphries DO at [facility]       [facility] /reviewed     Social History     Socioeconomic History    Marital status:       Spouse name: Not on file    Number of children: Not on file    Years of education: Not on file    Highest education level: Not on file   Occupational History    Not on file   Tobacco Use    Smoking status: Never    Smokeless tobacco: Never   Substance and Sexual Activity    Alcohol use: No    Drug use: No    Sexual activity: Not on file   Other Topics Concern    Not on file   Social History Narrative    Not on file     Social Determinants of Health     Financial Resource Strain: Not on file   Food Insecurity: Not on file   Transportation Needs: Not on file   Physical Activity: Not on file   Stress: Not on file   Social Connections: Not on file   Intimate Partner Violence: Not on file   Housing Stability: Not on file       Review of Systems   Reason unable to perform ROS: Limited due to dementia states occasional cough states she just does not feel right. Physical Exam  Vitals and nursing note reviewed. Constitutional:       General: She is not in acute distress. Appearance: She is underweight. She is ill-appearing. She is not diaphoretic. Comments: Fatigued   HENT:      Head: Normocephalic and atraumatic. Right Ear: External ear normal.      Left Ear: External ear normal.   Eyes:      General:         Right eye: No discharge. Left eye: No discharge. Neck:      Trachea: No tracheal deviation. Cardiovascular:      Rate and Rhythm: Normal rate and regular rhythm. Pulses: Normal pulses. Heart sounds: Normal heart sounds. No murmur heard. No friction rub. No gallop. Pulmonary:      Effort: Pulmonary effort is normal. No respiratory distress. Breath sounds: Normal breath sounds. No stridor. No wheezing, rhonchi or rales. Comments: Diminished in bilateral bases  Chest:      Chest wall: No tenderness. Abdominal:      General: Bowel sounds are normal. There is no distension. Palpations: Abdomen is soft. Tenderness: There is no abdominal tenderness. Musculoskeletal:         General: No swelling. Right lower leg: No edema. Left lower leg: No edema. Skin:     General: Skin is warm and dry. Capillary Refill: Capillary refill takes less than 2 seconds. Coloration: Skin is not pale. Findings: No rash. Neurological:      General: No focal deficit present.       Mental Status: She is alert. Mental status is at baseline. Cranial Nerves: No cranial nerve deficit. Sensory: No sensory deficit. Motor: Weakness present. Coordination: Coordination normal.   Psychiatric:         Mood and Affect: Mood normal.         Behavior: Behavior normal. Behavior is cooperative. Vital Signs: Temperature 97.8 °F, blood pressure 146/80, pulse 55, respirations 16, SPO2 98% on room air    Assessment:  1. Weakness  2. Dementia with psychotic disturbance, unspecified dementia severity, unspecified dementia type  3. Essential hypertension  4. Paroxysmal atrial fibrillation (HCC)  5. Weight loss  Stat UA stat chest x-ray stat CBC CMP TSH. With progressive overall physical and mentation decline along with a 20 pound weight loss from August suggest care conference for ongoing discussion on plan of care for patient. Call labs and chest x-ray once obtained        Plan:  As noted above. Follow up for routine visit. Call sooner with concerns prior.     Electronically signed by GOYO Hernandez CNP on 1/17/2023 at 10:03 PM

## 2023-01-21 ENCOUNTER — TELEPHONE (OUTPATIENT)
Dept: INTERNAL MEDICINE | Age: 83
End: 2023-01-21

## 2023-01-21 NOTE — TELEPHONE ENCOUNTER
01/21/23 13:30: Spoke with Robi at [address] [facility] while on call. UA sensitivity came back positive for ESBL & proteus mirabilus. She was started on doxycycline earlier this week for UTI. Per nurse, ESBL sensitive to cefipime, tazo, genatmcyin, meropenum, tazobactam piperaccillin, and tobramycin. Proteus sensitive to augmentin, amplicillin, ceftin, piperacillin. Patient is allergic to penicillin. She is presently afebrile, asymptomatic aside from slight mental status changes that initially prompted UA. Wt 95.2 lb, Cr 2.5, calculated CrCl = 11. Per recent progress note 01/18/23, family was considering hospice, DNR-CCA. Had previously declined IV fluids in favor of trying to rehydrate patient orally. Advised nurse to notify family that due to her allergies and multi-drug resistant UTI, would need to treat with IV antibiotic. If family is agreeable, will proceed with treatment.

## 2023-01-21 NOTE — TELEPHONE ENCOUNTER
01/21/23 13:30: Spoke with Robi at [address] [facility] while on call. UA sensitivity came back positive for ESBL & proteus mirabilus. She was started on doxycycline earlier this week for UTI. Per nurse, ESBL sensitive to augmentin, ceftazidime, ceftolozane, gentymicin, impamenum, meropenem, piperacillin, pobramycin. Proteus sensitive to sulbactam, ampicillin, augmentin, ceftriaxone, cefuroxime, piperacillin. Patient is allergic to penicillin. She is presently afebrile, asymptomatic aside from slight mental status changes that initially prompted UA. Wt 95.2 lb, Cr 2.5, calculated CrCl = 11. Per recent progress note 01/18/23, family was considering hospice, DNR-CCA. Had previously declined IV fluids in favor of trying to rehydrate patient orally. Advised nurse to notify family that due to her allergies and multi-drug resistant UTI, would need to treat with IV antibiotic. If family is agreeable, will proceed with treatment. Update 15:00: family would like to proceed with IV antibiotics. Patient is allergic to penicillin, but per EMR she has tolerated IV cephalosporins on multiple occasions. Difficult to interpret C&S as it is not presently available in epic, nurse read results as above multiple times. While there is no specific overlap in sensitivities between the two bacteria, did both did note third line cephalosporin as sensitive. Given decline in renal function, will plan to start IV saline as well. Following verbal orders were given to nurse:  - Start ceftriaxone IV, pharmacy to dose  - Discontinue doxycycline  - Start normal saline 1 liter at 75 mL/hr  - Check INR  - Check BMP  - Check CBC  - Infectious disease consult  Nurse read back orders as detailed above. Update 18:50: Staff is unable to start peripheral IV, requesting referral for PICC line so that IV ATB and saline may be administered.  Advised that would need to contact family and educate family regarding risks and benefits of starting a PICC, particularly given they were considering hospice prior. Patient remains stable and afebrile. Gave order to check stat CBC, BMP, INR at this time. Will plan to assess further treatment plan once labs are received and family is updated. Update 19:20: Family declines PICC line, still considering  hospice. Advised nurse that we can still check stat CBC, BMP, INR, lactate, CRP at this time to evaluate for sepsis if family is agreeable.

## 2023-01-23 ENCOUNTER — ANTI-COAG VISIT (OUTPATIENT)
Dept: INTERNAL MEDICINE | Age: 83
End: 2023-01-23
Payer: MEDICARE

## 2023-01-23 DIAGNOSIS — Z51.81 MONITORING FOR LONG-TERM ANTICOAGULANT USE: ICD-10-CM

## 2023-01-23 DIAGNOSIS — I34.0 NONRHEUMATIC MITRAL VALVE REGURGITATION: Primary | ICD-10-CM

## 2023-01-23 DIAGNOSIS — Z79.01 MONITORING FOR LONG-TERM ANTICOAGULANT USE: ICD-10-CM

## 2023-01-23 DIAGNOSIS — I48.0 PAROXYSMAL ATRIAL FIBRILLATION (HCC): ICD-10-CM

## 2023-01-23 LAB — INR BLD: 3.4

## 2023-01-23 PROCEDURE — 93793 ANTICOAG MGMT PT WARFARIN: CPT | Performed by: INTERNAL MEDICINE

## 2023-01-23 NOTE — PROGRESS NOTES
Patient is currently on 3 mg. every day. Per your last orders. (4 mg is not correct)  Patient refused Coumadin on 1/22/23 (yest). Patient should be going to Hospice today. Family requests to continue Coumadin/INR's.

## 2023-01-25 ENCOUNTER — TELEPHONE (OUTPATIENT)
Dept: INTERNAL MEDICINE | Age: 83
End: 2023-01-25

## 2023-01-25 NOTE — TELEPHONE ENCOUNTER
[address] called. They will be admitting patient through [id number] [facility].   Will fax information

## 2023-02-02 ENCOUNTER — TELEPHONE (OUTPATIENT)
Dept: INTERNAL MEDICINE | Age: 83
End: 2023-02-02

## 2023-04-08 ENCOUNTER — OUTSIDE SERVICES (OUTPATIENT)
Dept: INTERNAL MEDICINE | Age: 83
End: 2023-04-08

## 2023-04-08 DIAGNOSIS — I34.0 NONRHEUMATIC MITRAL VALVE REGURGITATION: ICD-10-CM

## 2023-04-08 DIAGNOSIS — E78.2 MIXED HYPERLIPIDEMIA: ICD-10-CM

## 2023-04-08 DIAGNOSIS — I10 ESSENTIAL HYPERTENSION: Primary | ICD-10-CM

## 2023-04-08 DIAGNOSIS — F32.3 MAJOR DEPRESSION WITH PSYCHOTIC FEATURES (HCC): ICD-10-CM

## 2023-04-08 DIAGNOSIS — I48.0 PAROXYSMAL ATRIAL FIBRILLATION (HCC): ICD-10-CM

## 2023-04-08 DIAGNOSIS — S72.142S CLOSED DISPLACED INTERTROCHANTERIC FRACTURE OF LEFT FEMUR, SEQUELA: ICD-10-CM

## 2023-04-08 DIAGNOSIS — Z51.5 HOSPICE CARE PATIENT: ICD-10-CM

## 2023-04-08 DIAGNOSIS — M80.00XD OSTEOPOROSIS WITH PATHOLOGICAL FRACTURE WITH ROUTINE HEALING, SUBSEQUENT ENCOUNTER: ICD-10-CM

## 2023-04-08 DIAGNOSIS — F03.92 DEMENTIA WITH PSYCHOTIC DISTURBANCE, UNSPECIFIED DEMENTIA SEVERITY, UNSPECIFIED DEMENTIA TYPE (HCC): ICD-10-CM

## 2023-04-09 NOTE — PROGRESS NOTES
no murmur, click, rub or gallop No S3 or S4. Abdomen: Non-obese soft, non-distended, non-tender, normal active bowel sounds, no masses palpated, and no hepatosplenomegaly  Extremities: No clubbing, cyanosis, or edema in any of the extremities. Neurologic: cranial nerves II-XII are grossly intact    ASSESSMENT/PLAN:    1. Essential hypertension, controlled  - She will continue to take her antihypertensive medication     2. Mixed hyperlipidemia, stable  - We will continue to monitor     3. Paroxysmal atrial fibrillation ([address] stable  - Coumadin management is ongoing. We are managing her Coumadin    4. Nonrheumatic mitral valve regurgitation, stable  - We will continue to monitor     5. Closed displaced intertrochanteric fracture of left femur, sequela, stable  6. Osteoporosis with pathological fracture with routine healing, subsequent encounter, stable  - We will continue to monitor     7. Major depression with psychotic features ([address] stable  - She will continue to take her antidepressant    8. Dementia with psychotic disturbance, unspecified dementia severity, unspecified dementia type ([address] stable  - Seroquel dose reviewed. It is appropriate. Continue current dose. 9. Hospice care patient  - We will follow along with Hospice       Continue current treatment. Nursing home record reviewed and updates summarized and entered into electronic record. See nursing home orders and MAR.         Electronically signed by Pilar Michael DO on 4/8/2023 at 8:08 PM  Internal Medicine

## (undated) DEVICE — TOWEL,OR,DSP,ST,BLUE,STD,4/PK,20PK/CS: Brand: MEDLINE

## (undated) DEVICE — SYRINGE A08E KIS INFLATION HP: Brand: KYPHON®  INFLATION SYRINGE

## (undated) DEVICE — GUIDE PIN 3.2MM X 343MM: Brand: TRIGEN

## (undated) DEVICE — SOLUTION SCRB 4OZ 4% CHG H2O AIDED FOR PREOPERATIVE SKIN

## (undated) DEVICE — SVMMC ORTH SPL DRP PK

## (undated) DEVICE — DRAPE,U/ SHT,SPLIT,PLAS,STERIL: Brand: MEDLINE

## (undated) DEVICE — DRESSING TRNSPAR W5XL4.5IN FLM SHT SEMIPERMEABLE WIND

## (undated) DEVICE — STOCKINETTE,IMPERVIOUS,12X48,STERILE: Brand: MEDLINE

## (undated) DEVICE — INTENDED FOR TISSUE SEPARATION, AND OTHER PROCEDURES THAT REQUIRE A SHARP SURGICAL BLADE TO PUNCTURE OR CUT.: Brand: BARD-PARKER ® CARBON RIB-BACK BLADES

## (undated) DEVICE — TRIGEN LOW PROFILE SCREW 5.0MM X 47.5MM
Type: IMPLANTABLE DEVICE | Site: HIP | Status: NON-FUNCTIONAL
Brand: TRIGEN

## (undated) DEVICE — GOWN,AURORA,NONREINFORCED,LARGE: Brand: MEDLINE

## (undated) DEVICE — 1000 S-DRAPE TOWEL DRAPE 10/BX: Brand: STERI-DRAPE™

## (undated) DEVICE — C-ARM: Brand: UNBRANDED

## (undated) DEVICE — SUTURE VCRL SZ 0 L18IN ABSRB UD L36MM CT-1 1/2 CIR J840D

## (undated) DEVICE — GLOVE ORTHO 8   MSG9480

## (undated) DEVICE — CHLORAPREP 26ML CLEAR

## (undated) DEVICE — Z DISCONTINUED USE 2272124 DRAPE SURG XL N INVASIVE 2 LAYR DISP

## (undated) DEVICE — CYSTO/BLADDER IRRIGATION SET, REGULATING CLAMP

## (undated) DEVICE — GAUZE,SPONGE,FLUFF,6"X6.75",STRL,5/TRAY: Brand: MEDLINE

## (undated) DEVICE — TUBING AMB

## (undated) DEVICE — GOWN,SIRUS,NONRNF,SETINSLV,XL,20/CS: Brand: MEDLINE

## (undated) DEVICE — BONE CEMENT CX01B KYPHON XPEDE W MXR US: Brand: KYPHON® XPEDE™ BONE CEMENT AND KYPHON® MIXER PACK

## (undated) DEVICE — INTERTAN LAG SCREW DRILL: Brand: TRIGEN

## (undated) DEVICE — SHEET, T, LAPAROTOMY, STERILE: Brand: MEDLINE

## (undated) DEVICE — GLOVE ORANGE PI 7 1/2   MSG9075

## (undated) DEVICE — NEEDLE SPNL 22GA L3.5IN BLK HUB S STL REG WALL FIT STYL W/

## (undated) DEVICE — COVER,TABLE,HEAVY DUTY,77"X90",STRL: Brand: MEDLINE

## (undated) DEVICE — C-ARMOR C-ARM EQUIPMENT COVERS CLEAR STERILE UNIVERSAL FIT 12 PER CASE: Brand: C-ARMOR

## (undated) DEVICE — INTENDED TO SUPPORT AND MAINTAIN THE POSITION OF AN ANESTHETIZED PATIENT DURING SURGERY: Brand: MD TRACTION ROPE

## (undated) DEVICE — Device

## (undated) DEVICE — 3M™ TEGADERM™ TRANSPARENT FILM DRESSING FRAME STYLE, 1626W, 4 IN X 4-3/4 IN (10 CM X 12 CM), 50/CT 4CT/CASE: Brand: 3M™ TEGADERM™

## (undated) DEVICE — 3.0MM X 1000MM BALL TIP GUIDE ROD: Brand: TRIGEN

## (undated) DEVICE — GAUZE,SPONGE,4"X4",16PLY,XRAY,STRL,LF: Brand: MEDLINE

## (undated) DEVICE — BONE TAMP KIT KPX203PB FF X2 20/3 1 STP: Brand: KYPHOPAK™ TRAY

## (undated) DEVICE — GLOVE ORANGE PI 8   MSG9080

## (undated) DEVICE — BONE TAMP KIT KPX203NB AF X2 20/3

## (undated) DEVICE — GAUZE,SPONGE,4"X4",12PLY,STERILE,LF,2'S: Brand: MEDLINE

## (undated) DEVICE — PACK SURG PROC REMINDER N WVN DISPOSABLE BEAC TIME OUT

## (undated) DEVICE — STRIP,CLOSURE,WOUND,MEDI-STRIP,1/2X4: Brand: MEDLINE

## (undated) DEVICE — GLOVE SURG SZ 65 THK91MIL LTX FREE SYN POLYISOPRENE

## (undated) DEVICE — 4.0MM SHORT PILOT DRILL WITH AO CONNECTOR: Brand: TRIGEN

## (undated) DEVICE — GLOVE ORANGE PI 8 1/2   MSG9085

## (undated) DEVICE — COVER LT HNDL BLU PLAS

## (undated) DEVICE — APPLICATOR MEDICATED 26 CC SOLUTION HI LT ORNG CHLORAPREP

## (undated) DEVICE — DRESSING,GAUZE,XEROFORM,CURAD,1"X8",ST: Brand: CURAD

## (undated) DEVICE — MARKER W/PRE PRINTED CUSTOM LABEL

## (undated) DEVICE — Z DISCONTINUED USE 2651424 COVER EQUIP D18IN CNTOUR ELAS BND SNUG CLSR

## (undated) DEVICE — GOWN,SIRUS,NONRNF,SETINSLV,2XL,18/CS: Brand: MEDLINE

## (undated) DEVICE — GLOVE SURG SZ 6 THK91MIL LTX FREE SYN POLYISOPRENE ANTI

## (undated) DEVICE — SUTURE VCRL SZ 2-0 L18IN ABSRB UD CT-1 L36MM 1/2 CIR J839D

## (undated) DEVICE — GLOVE ORANGE PI 7   MSG9070

## (undated) DEVICE — COUNTER NEEDLE 10/20 COUNT DEVON   96/CS